# Patient Record
Sex: FEMALE | Race: BLACK OR AFRICAN AMERICAN | Employment: OTHER | URBAN - METROPOLITAN AREA
[De-identification: names, ages, dates, MRNs, and addresses within clinical notes are randomized per-mention and may not be internally consistent; named-entity substitution may affect disease eponyms.]

---

## 2019-05-08 ENCOUNTER — HOSPITAL ENCOUNTER (EMERGENCY)
Age: 44
Discharge: HOME | End: 2019-05-08
Payer: MEDICAID

## 2019-05-08 ENCOUNTER — APPOINTMENT (OUTPATIENT)
Dept: CT IMAGING | Age: 44
End: 2019-05-08
Payer: MEDICAID

## 2019-05-08 VITALS
HEART RATE: 89 BPM | DIASTOLIC BLOOD PRESSURE: 83 MMHG | TEMPERATURE: 98.7 F | WEIGHT: 293 LBS | RESPIRATION RATE: 18 BRPM | HEIGHT: 65 IN | SYSTOLIC BLOOD PRESSURE: 131 MMHG | OXYGEN SATURATION: 98 % | BODY MASS INDEX: 48.82 KG/M2

## 2019-05-08 DIAGNOSIS — G89.18 POST-OPERATIVE PAIN: Primary | ICD-10-CM

## 2019-05-08 LAB
ALBUMIN SERPL-MCNC: 4.1 G/DL (ref 3.5–5.2)
ALP BLD-CCNC: 74 U/L (ref 35–104)
ALT SERPL-CCNC: 12 U/L (ref 0–32)
ANION GAP SERPL CALCULATED.3IONS-SCNC: 14 MMOL/L (ref 7–16)
AST SERPL-CCNC: 12 U/L (ref 0–31)
BASOPHILS ABSOLUTE: 0.04 E9/L (ref 0–0.2)
BASOPHILS RELATIVE PERCENT: 0.3 % (ref 0–2)
BILIRUB SERPL-MCNC: 0.4 MG/DL (ref 0–1.2)
BILIRUBIN URINE: NEGATIVE
BLOOD, URINE: NEGATIVE
BUN BLDV-MCNC: 12 MG/DL (ref 6–20)
CALCIUM SERPL-MCNC: 9.1 MG/DL (ref 8.6–10.2)
CHLORIDE BLD-SCNC: 103 MMOL/L (ref 98–107)
CLARITY: CLEAR
CO2: 24 MMOL/L (ref 22–29)
COLOR: YELLOW
CREAT SERPL-MCNC: 0.8 MG/DL (ref 0.5–1)
EOSINOPHILS ABSOLUTE: 0.18 E9/L (ref 0.05–0.5)
EOSINOPHILS RELATIVE PERCENT: 1.3 % (ref 0–6)
GFR AFRICAN AMERICAN: >60
GFR NON-AFRICAN AMERICAN: >60 ML/MIN/1.73
GLUCOSE BLD-MCNC: 110 MG/DL (ref 74–99)
GLUCOSE URINE: NEGATIVE MG/DL
HCG, URINE, POC: NEGATIVE
HCT VFR BLD CALC: 40 % (ref 34–48)
HEMOGLOBIN: 13.1 G/DL (ref 11.5–15.5)
IMMATURE GRANULOCYTES #: 0.06 E9/L
IMMATURE GRANULOCYTES %: 0.4 % (ref 0–5)
KETONES, URINE: NEGATIVE MG/DL
LACTIC ACID: 1.6 MMOL/L (ref 0.5–2.2)
LEUKOCYTE ESTERASE, URINE: NEGATIVE
LIPASE: 27 U/L (ref 13–60)
LYMPHOCYTES ABSOLUTE: 2.85 E9/L (ref 1.5–4)
LYMPHOCYTES RELATIVE PERCENT: 21 % (ref 20–42)
Lab: NORMAL
MCH RBC QN AUTO: 30 PG (ref 26–35)
MCHC RBC AUTO-ENTMCNC: 32.8 % (ref 32–34.5)
MCV RBC AUTO: 91.5 FL (ref 80–99.9)
MONOCYTES ABSOLUTE: 0.98 E9/L (ref 0.1–0.95)
MONOCYTES RELATIVE PERCENT: 7.2 % (ref 2–12)
NEGATIVE QC PASS/FAIL: NORMAL
NEUTROPHILS ABSOLUTE: 9.43 E9/L (ref 1.8–7.3)
NEUTROPHILS RELATIVE PERCENT: 69.8 % (ref 43–80)
NITRITE, URINE: NEGATIVE
PDW BLD-RTO: 13.8 FL (ref 11.5–15)
PH UA: 6 (ref 5–9)
PLATELET # BLD: 308 E9/L (ref 130–450)
PMV BLD AUTO: 10.2 FL (ref 7–12)
POSITIVE QC PASS/FAIL: NORMAL
POTASSIUM REFLEX MAGNESIUM: 3.9 MMOL/L (ref 3.5–5)
PROTEIN UA: NEGATIVE MG/DL
RBC # BLD: 4.37 E12/L (ref 3.5–5.5)
SODIUM BLD-SCNC: 141 MMOL/L (ref 132–146)
SPECIFIC GRAVITY UA: >=1.03 (ref 1–1.03)
TOTAL PROTEIN: 7.8 G/DL (ref 6.4–8.3)
UROBILINOGEN, URINE: 1 E.U./DL
WBC # BLD: 13.5 E9/L (ref 4.5–11.5)

## 2019-05-08 PROCEDURE — 96375 TX/PRO/DX INJ NEW DRUG ADDON: CPT

## 2019-05-08 PROCEDURE — 85025 COMPLETE CBC W/AUTO DIFF WBC: CPT

## 2019-05-08 PROCEDURE — 6360000002 HC RX W HCPCS: Performed by: PHYSICIAN ASSISTANT

## 2019-05-08 PROCEDURE — 87186 SC STD MICRODIL/AGAR DIL: CPT

## 2019-05-08 PROCEDURE — 74177 CT ABD & PELVIS W/CONTRAST: CPT

## 2019-05-08 PROCEDURE — 2580000003 HC RX 258: Performed by: PHYSICIAN ASSISTANT

## 2019-05-08 PROCEDURE — 80053 COMPREHEN METABOLIC PANEL: CPT

## 2019-05-08 PROCEDURE — 83690 ASSAY OF LIPASE: CPT

## 2019-05-08 PROCEDURE — 81003 URINALYSIS AUTO W/O SCOPE: CPT

## 2019-05-08 PROCEDURE — 87088 URINE BACTERIA CULTURE: CPT

## 2019-05-08 PROCEDURE — 99284 EMERGENCY DEPT VISIT MOD MDM: CPT

## 2019-05-08 PROCEDURE — 96374 THER/PROPH/DIAG INJ IV PUSH: CPT

## 2019-05-08 PROCEDURE — 6360000004 HC RX CONTRAST MEDICATION: Performed by: RADIOLOGY

## 2019-05-08 PROCEDURE — 83605 ASSAY OF LACTIC ACID: CPT

## 2019-05-08 RX ORDER — OXYCODONE HYDROCHLORIDE AND ACETAMINOPHEN 5; 325 MG/1; MG/1
1 TABLET ORAL EVERY 6 HOURS PRN
Qty: 10 TABLET | Refills: 0 | Status: SHIPPED | OUTPATIENT
Start: 2019-05-08 | End: 2019-05-11

## 2019-05-08 RX ORDER — ONDANSETRON 2 MG/ML
4 INJECTION INTRAMUSCULAR; INTRAVENOUS ONCE
Status: COMPLETED | OUTPATIENT
Start: 2019-05-08 | End: 2019-05-08

## 2019-05-08 RX ORDER — MORPHINE SULFATE 2 MG/ML
8 INJECTION, SOLUTION INTRAMUSCULAR; INTRAVENOUS ONCE
Status: COMPLETED | OUTPATIENT
Start: 2019-05-08 | End: 2019-05-08

## 2019-05-08 RX ORDER — 0.9 % SODIUM CHLORIDE 0.9 %
1000 INTRAVENOUS SOLUTION INTRAVENOUS ONCE
Status: COMPLETED | OUTPATIENT
Start: 2019-05-08 | End: 2019-05-08

## 2019-05-08 RX ADMIN — IOPAMIDOL 110 ML: 755 INJECTION, SOLUTION INTRAVENOUS at 20:05

## 2019-05-08 RX ADMIN — SODIUM CHLORIDE 1000 ML: 9 INJECTION, SOLUTION INTRAVENOUS at 19:16

## 2019-05-08 RX ADMIN — MORPHINE SULFATE 8 MG: 2 INJECTION, SOLUTION INTRAMUSCULAR; INTRAVENOUS at 19:17

## 2019-05-08 RX ADMIN — ONDANSETRON 4 MG: 2 INJECTION INTRAMUSCULAR; INTRAVENOUS at 19:16

## 2019-05-08 ASSESSMENT — PAIN SCALES - GENERAL: PAINLEVEL_OUTOF10: 8

## 2019-05-08 ASSESSMENT — PAIN DESCRIPTION - LOCATION: LOCATION: ABDOMEN

## 2019-05-08 ASSESSMENT — PAIN DESCRIPTION - PAIN TYPE: TYPE: ACUTE PAIN;SURGICAL PAIN

## 2019-05-08 ASSESSMENT — PAIN DESCRIPTION - FREQUENCY: FREQUENCY: CONTINUOUS

## 2019-05-08 ASSESSMENT — PAIN DESCRIPTION - ORIENTATION: ORIENTATION: LOWER;LEFT

## 2019-05-08 ASSESSMENT — PAIN - FUNCTIONAL ASSESSMENT: PAIN_FUNCTIONAL_ASSESSMENT: ACTIVITIES ARE NOT PREVENTED

## 2019-05-08 ASSESSMENT — PAIN DESCRIPTION - DESCRIPTORS: DESCRIPTORS: TIGHTNESS;BURNING

## 2019-05-08 NOTE — ED PROVIDER NOTES
[facility]      Department of Emergency Medicine   ED  Provider Note  Admit Date/RoomTime: 5/8/2019  6:27 PM  ED Room: 32/32  MRN: [id number]  Chief Complaint       Abdominal Pain (Had hernia repair 2 weeks ago, still having pain, saw her surgeon 5/2 and he gave her additional pain meds but she is taking them but they are not helping)    History of Present Illness   Source of history provided by:  patient. History/Exam Limitations: none. Khushbu Negro is a 39 y.o. old female who has a past medical history of: No past medical history on file. presents to the emergency department by private vehicle, for complaints of pain since her hernia surgery burning, cramping pain in the periumbilical area and in the LUQ without radiation which began 12 day(s) prior to arrival.   There has been similar episodes in the past related to the hernia. Since onset the symptoms have been persistent. The pain is associated with nothing pertinent. The pain is aggravated by changing position and relieved by nothing, pt is taking her percocet from surgeon but they are not helping. She is using exlax for constipation. There has been NO back pain, chills, cloudy urine, dysuria or nausea or vomiting, bloody stool, or fevers. .     GYN History: regular. STD History: no history of PID, STD's. No LMP recorded. Current Pregnancy: No.   .    Gravid Status: No obstetric history on file. .  ROS   Pertinent positives and negatives are stated within HPI, all other systems reviewed and are negative. Past Surgical History:   Procedure Laterality Date    ABDOMEN SURGERY      OVARIAN CYST REMOVAL, TUBAL LIGATION, C SECTION    HYSTERECTOMY, TOTAL ABDOMINAL  5/6/2015    Dr. Pranav Amaya   Social History:  reports that she has quit smoking. She does not have any smokeless tobacco history on file. She reports that she drinks alcohol. She reports that she does not use drugs.   Family History: family history includes Asthma in her brother and sister; Cancer in her father; Heart Disease in her brother; High Blood Pressure in her brother, father, and sister. Allergies: Vicodin [hydrocodone-acetaminophen]    Physical Exam           ED Triage Vitals   BP Temp Temp Source Pulse Resp SpO2 Height Weight   05/08/19 1731 05/08/19 1731 05/08/19 1731 05/08/19 1731 05/08/19 1844 05/08/19 1731 05/08/19 1731 05/08/19 1731   (!) 159/75 98.7 °F (37.1 °C) Oral 93 20 93 % 5' 5\" (1.651 m) (!) 325 lb (147.4 kg)      Oxygen Saturation Interpretation: Normal.    · General Appearance/Constitutional:  Alert, development consistent with age. · HEENT:  NC/NT. PERRLA. Airway patent. · Neck:  Supple. No lymphadenopathy. · Respiratory:  No retractions. Lungs Clear to auscultation and breath sounds equal.  · CV:  Regular rate and rhythm. · GI:  General Appearance: scars- periumbilical.         Bowel sounds: hypoactive bowel sounds. Distension:  None. Tenderness: moderate tenderness is present umbilical and very near surrounding area, LUQ, left periumbilical area. , no rebound tenderness, no guarding, abdominal rigidity is absent. Liver: non-tender. Spleen:  non-tender. Pulsatile Mass: absent. Hernia:  incisional hernia and tender. There appears to be a hernia in the umbilicus vs, hernia repair tissue  · Back: CVA Tenderness: No.  · : (chaperone present during examination). NOt examined. · Integument:  Normal turgor. Warm, dry, without visible rash, unless noted elsewhere. · Lymphatics: No edema, cap.refill <3sec. · Neurological:  Orientation age-appropriate. Motor functions intact.     Lab / Imaging Results   (All laboratory and radiology results have been personally reviewed by myself)  Labs:  Results for orders placed or performed during the hospital encounter of 05/08/19   CBC Auto Differential   Result Value Ref Range    WBC 13.5 (H) 4.5 - 11.5 E9/L    RBC 4.37 3.50 - 5.50 E12/L Hemoglobin 13.1 11.5 - 15.5 g/dL    Hematocrit 40.0 34.0 - 48.0 %    MCV 91.5 80.0 - 99.9 fL    MCH 30.0 26.0 - 35.0 pg    MCHC 32.8 32.0 - 34.5 %    RDW 13.8 11.5 - 15.0 fL    Platelets 508 084 - 793 E9/L    MPV 10.2 7.0 - 12.0 fL    Neutrophils % 69.8 43.0 - 80.0 %    Immature Granulocytes % 0.4 0.0 - 5.0 %    Lymphocytes % 21.0 20.0 - 42.0 %    Monocytes % 7.2 2.0 - 12.0 %    Eosinophils % 1.3 0.0 - 6.0 %    Basophils % 0.3 0.0 - 2.0 %    Neutrophils # 9.43 (H) 1.80 - 7.30 E9/L    Immature Granulocytes # 0.06 E9/L    Lymphocytes # 2.85 1.50 - 4.00 E9/L    Monocytes # 0.98 (H) 0.10 - 0.95 E9/L    Eosinophils # 0.18 0.05 - 0.50 E9/L    Basophils # 0.04 0.00 - 0.20 E9/L   Comprehensive Metabolic Panel w/ Reflex to MG   Result Value Ref Range    Sodium 141 132 - 146 mmol/L    Potassium reflex Magnesium 3.9 3.5 - 5.0 mmol/L    Chloride 103 98 - 107 mmol/L    CO2 24 22 - 29 mmol/L    Anion Gap 14 7 - 16 mmol/L    Glucose 110 (H) 74 - 99 mg/dL    BUN 12 6 - 20 mg/dL    CREATININE 0.8 0.5 - 1.0 mg/dL    GFR Non-African American >60 >=60 mL/min/1.73    GFR African American >60     Calcium 9.1 8.6 - 10.2 mg/dL    Total Protein 7.8 6.4 - 8.3 g/dL    Alb 4.1 3.5 - 5.2 g/dL    Total Bilirubin 0.4 0.0 - 1.2 mg/dL    Alkaline Phosphatase 74 35 - 104 U/L    ALT 12 0 - 32 U/L    AST 12 0 - 31 U/L   Lactic Acid, Plasma   Result Value Ref Range    Lactic Acid 1.6 0.5 - 2.2 mmol/L   Lipase   Result Value Ref Range    Lipase 27 13 - 60 U/L   Urinalysis, reflex to microscopic   Result Value Ref Range    Color, UA Yellow Straw/Yellow    Clarity, UA Clear Clear    Glucose, Ur Negative Negative mg/dL    Bilirubin Urine Negative Negative    Ketones, Urine Negative Negative mg/dL    Specific Gravity, UA >=1.030 1.005 - 1.030    Blood, Urine Negative Negative    pH, UA 6.0 5.0 - 9.0    Protein, UA Negative Negative mg/dL    Urobilinogen, Urine 1.0 <2.0 E.U./dL    Nitrite, Urine Negative Negative    Leukocyte Esterase, Urine Negative Isma Solomon PA-C   DD: 5/8/19  **This report was transcribed using voice recognition software. Every effort was made to ensure accuracy; however, inadvertent computerized transcription errors may be present.   END OF ED PROVIDER NOTE       Isma Solomon PA-C  05/09/19 [id number]

## 2019-05-10 LAB
ORGANISM: ABNORMAL
URINE CULTURE, ROUTINE: ABNORMAL
URINE CULTURE, ROUTINE: ABNORMAL

## 2020-01-21 ENCOUNTER — HOSPITAL ENCOUNTER (OUTPATIENT)
Age: 45
Discharge: HOME | End: 2020-01-21
Payer: MEDICAID

## 2020-01-21 LAB
ANION GAP SERPL CALCULATED.3IONS-SCNC: 15 MMOL/L (ref 7–16)
BASOPHILS ABSOLUTE: 0.04 E9/L (ref 0–0.2)
BASOPHILS RELATIVE PERCENT: 0.3 % (ref 0–2)
BUN BLDV-MCNC: 11 MG/DL (ref 6–20)
CALCIUM SERPL-MCNC: 9.1 MG/DL (ref 8.6–10.2)
CHLORIDE BLD-SCNC: 101 MMOL/L (ref 98–107)
CHOLESTEROL, TOTAL: 184 MG/DL (ref 0–199)
CO2: 23 MMOL/L (ref 22–29)
CREAT SERPL-MCNC: 0.9 MG/DL (ref 0.5–1)
EOSINOPHILS ABSOLUTE: 0.11 E9/L (ref 0.05–0.5)
EOSINOPHILS RELATIVE PERCENT: 0.9 % (ref 0–6)
GFR AFRICAN AMERICAN: >60
GFR NON-AFRICAN AMERICAN: >60 ML/MIN/1.73
GLUCOSE BLD-MCNC: 78 MG/DL (ref 74–99)
HCT VFR BLD CALC: 42.1 % (ref 34–48)
HDLC SERPL-MCNC: 63 MG/DL
HEMOGLOBIN: 13.1 G/DL (ref 11.5–15.5)
IMMATURE GRANULOCYTES #: 0.05 E9/L
IMMATURE GRANULOCYTES %: 0.4 % (ref 0–5)
LDL CHOLESTEROL CALCULATED: 97 MG/DL (ref 0–99)
LYMPHOCYTES ABSOLUTE: 3.92 E9/L (ref 1.5–4)
LYMPHOCYTES RELATIVE PERCENT: 31.6 % (ref 20–42)
MCH RBC QN AUTO: 28.9 PG (ref 26–35)
MCHC RBC AUTO-ENTMCNC: 31.1 % (ref 32–34.5)
MCV RBC AUTO: 92.9 FL (ref 80–99.9)
MONOCYTES ABSOLUTE: 1.03 E9/L (ref 0.1–0.95)
MONOCYTES RELATIVE PERCENT: 8.3 % (ref 2–12)
NEUTROPHILS ABSOLUTE: 7.27 E9/L (ref 1.8–7.3)
NEUTROPHILS RELATIVE PERCENT: 58.5 % (ref 43–80)
PDW BLD-RTO: 14.4 FL (ref 11.5–15)
PLATELET # BLD: 285 E9/L (ref 130–450)
PMV BLD AUTO: 10.6 FL (ref 7–12)
POTASSIUM SERPL-SCNC: 4.1 MMOL/L (ref 3.5–5)
RBC # BLD: 4.53 E12/L (ref 3.5–5.5)
SODIUM BLD-SCNC: 139 MMOL/L (ref 132–146)
TRIGL SERPL-MCNC: 118 MG/DL (ref 0–149)
TSH SERPL DL<=0.05 MIU/L-ACNC: 3.18 UIU/ML (ref 0.27–4.2)
VLDLC SERPL CALC-MCNC: 24 MG/DL
WBC # BLD: 12.4 E9/L (ref 4.5–11.5)

## 2020-01-21 PROCEDURE — 80048 BASIC METABOLIC PNL TOTAL CA: CPT

## 2020-01-21 PROCEDURE — 80061 LIPID PANEL: CPT

## 2020-01-21 PROCEDURE — 36415 COLL VENOUS BLD VENIPUNCTURE: CPT

## 2020-01-21 PROCEDURE — 84443 ASSAY THYROID STIM HORMONE: CPT

## 2020-01-21 PROCEDURE — 85025 COMPLETE CBC W/AUTO DIFF WBC: CPT

## 2020-01-23 ENCOUNTER — HOSPITAL ENCOUNTER (OUTPATIENT)
Age: 45
Discharge: HOME | End: 2020-01-25

## 2020-01-23 PROCEDURE — 88305 TISSUE EXAM BY PATHOLOGIST: CPT

## 2020-01-23 PROCEDURE — 88342 IMHCHEM/IMCYTCHM 1ST ANTB: CPT

## 2021-11-02 ENCOUNTER — TELEPHONE (OUTPATIENT)
Dept: ADMINISTRATIVE | Age: 46
End: 2021-11-02

## 2021-11-17 ENCOUNTER — HOSPITAL ENCOUNTER (OUTPATIENT)
Age: 46
Discharge: HOME | End: 2021-11-19

## 2021-11-17 PROCEDURE — 88305 TISSUE EXAM BY PATHOLOGIST: CPT

## 2021-11-17 PROCEDURE — 88173 CYTOPATH EVAL FNA REPORT: CPT

## 2021-11-23 ENCOUNTER — TELEPHONE (OUTPATIENT)
Dept: ORTHOPEDIC SURGERY | Age: 46
End: 2021-11-23

## 2021-11-23 DIAGNOSIS — M25.562 ACUTE PAIN OF LEFT KNEE: Primary | ICD-10-CM

## 2021-12-01 ENCOUNTER — OFFICE VISIT (OUTPATIENT)
Dept: ORTHOPEDIC SURGERY | Age: 46
End: 2021-12-01
Payer: MEDICAID

## 2021-12-01 ENCOUNTER — HOSPITAL ENCOUNTER (OUTPATIENT)
Dept: GENERAL RADIOLOGY | Age: 46
Discharge: HOME | End: 2021-12-03
Payer: MEDICAID

## 2021-12-01 VITALS — HEIGHT: 63 IN | TEMPERATURE: 97.7 F | BODY MASS INDEX: 51.91 KG/M2 | WEIGHT: 293 LBS

## 2021-12-01 DIAGNOSIS — M25.562 ACUTE PAIN OF LEFT KNEE: ICD-10-CM

## 2021-12-01 DIAGNOSIS — M25.562 ACUTE PAIN OF LEFT KNEE: Primary | ICD-10-CM

## 2021-12-01 DIAGNOSIS — M17.12 LOCALIZED OSTEOARTHRITIS OF LEFT KNEE: ICD-10-CM

## 2021-12-01 DIAGNOSIS — E66.01 MORBID OBESITY WITH BMI OF 50.0-59.9, ADULT (HCC): Chronic | ICD-10-CM

## 2021-12-01 PROCEDURE — G8484 FLU IMMUNIZE NO ADMIN: HCPCS | Performed by: PHYSICIAN ASSISTANT

## 2021-12-01 PROCEDURE — G8417 CALC BMI ABV UP PARAM F/U: HCPCS | Performed by: PHYSICIAN ASSISTANT

## 2021-12-01 PROCEDURE — 2500000003 HC RX 250 WO HCPCS

## 2021-12-01 PROCEDURE — 4004F PT TOBACCO SCREEN RCVD TLK: CPT | Performed by: PHYSICIAN ASSISTANT

## 2021-12-01 PROCEDURE — 6360000002 HC RX W HCPCS

## 2021-12-01 PROCEDURE — 99204 OFFICE O/P NEW MOD 45 MIN: CPT | Performed by: PHYSICIAN ASSISTANT

## 2021-12-01 PROCEDURE — G8427 DOCREV CUR MEDS BY ELIG CLIN: HCPCS | Performed by: PHYSICIAN ASSISTANT

## 2021-12-01 PROCEDURE — 99202 OFFICE O/P NEW SF 15 MIN: CPT | Performed by: ORTHOPAEDIC SURGERY

## 2021-12-01 RX ORDER — LIDOCAINE HYDROCHLORIDE 10 MG/ML
3 INJECTION, SOLUTION INFILTRATION; PERINEURAL ONCE
Status: COMPLETED | OUTPATIENT
Start: 2021-12-01 | End: 2021-12-01

## 2021-12-01 RX ORDER — TRIAMCINOLONE ACETONIDE 40 MG/ML
40 INJECTION, SUSPENSION INTRA-ARTICULAR; INTRAMUSCULAR ONCE
Status: COMPLETED | OUTPATIENT
Start: 2021-12-01 | End: 2021-12-01

## 2021-12-01 RX ORDER — BUPIVACAINE HYDROCHLORIDE 2.5 MG/ML
3 INJECTION, SOLUTION EPIDURAL; INFILTRATION; INTRACAUDAL ONCE
Status: COMPLETED | OUTPATIENT
Start: 2021-12-01 | End: 2021-12-01

## 2021-12-01 RX ADMIN — LIDOCAINE HYDROCHLORIDE 3 ML: 10 INJECTION, SOLUTION INFILTRATION; PERINEURAL at 09:56

## 2021-12-01 RX ADMIN — TRIAMCINOLONE ACETONIDE 40 MG: 40 INJECTION, SUSPENSION INTRA-ARTICULAR; INTRAMUSCULAR at 09:56

## 2021-12-01 RX ADMIN — BUPIVACAINE HYDROCHLORIDE 7.5 MG: 2.5 INJECTION, SOLUTION EPIDURAL; INFILTRATION; INTRACAUDAL at 09:56

## 2021-12-01 NOTE — PROGRESS NOTES
New Patient Orthopaedic Progress Note    Sari Larson is a 51 y.o. 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 No current facility-administered medications for this visit. Allergies: Vicodin [hydrocodone-acetaminophen]  History reviewed. No pertinent past medical history. Past Surgical History:   Procedure Laterality Date    ABDOMEN SURGERY      OVARIAN CYST REMOVAL, TUBAL LIGATION, C SECTION    HYSTERECTOMY, TOTAL ABDOMINAL  5/6/2015    Dr. Lili Landis     Family History   Problem Relation Age of Onset    High Blood Pressure Father     Cancer Father     High Blood Pressure Sister     Asthma Sister     Heart Disease Brother     High Blood Pressure Brother     Asthma Brother      Social History     Tobacco Use    Smoking status: Former Smoker    Smokeless tobacco: Not on file   Substance Use Topics    Alcohol use: Yes     Comment: SOCIAL                             Chief Complaint   Patient presents with    Knee Pain     Pt expressed having constant 10/10 pain regardless of being active or not. Pt has difficutly sleeping at night due to increased pain. Pt expressed having sharp pain deep inside the Left Knee Joint. Pt expressed having crepitus within the Left Knee Joint causing increased pain. Pt expressed having a sensation of Left Knee being unstable. Pt expressed having an altered gait with ambulation. SUBJECTIVE: Sari Larson is a 40-year-old female who presents with left knee pain. She states that she injured her knee in 2016 after her knee was pinned against the wall after receiving a medial force to the left knee.   She states that her pain has been progressively worse and she feels at times like her left knee is going to buckle and give out. She does have some intermittent clicking and popping in her knee. She does use a neoprene knee sleeve for support as well as NSAIDs, Voltaren gel without much relief. She states that her left knee pain is worse with prolonged standing, walking or going up and down steps. Also states that her knee gets stiff when she is seated for too long. Sometimes she has difficulty sleeping at night due to discomfort in the knee. Also she feels that her knee grinds at times with flexion and extension. Denies numbness, tingling or paresthesias in the left leg. States that she has not had conservative measures such as therapy or injections in the knee. No prior surgeries on the left knee. Denies any other orthopedic complaints at this time. Review of Systems   Constitutional: Negative for fever, chills, diaphoresis, appetite change and fatigue. HENT: Negative for dental issues, hearing loss and tinnitus. Negative for congestion, sinus pressure, sneezing, sore throat. Negative for headache. Eyes: Negative for visual disturbance, blurred and double vision. Negative for pain, discharge, redness and itching  Respiratory: Negative for cough, shortness of breath and wheezing. Cardiovascular: Negative for chest pain, palpitations and leg swelling. No dyspnea on exertion   Gastrointestinal:   Negative for nausea, vomiting, abdominal pain, diarrhea, constipation  or black or bloody. Hematologic\Lymphatic:  negative for bleeding, petechiae,   Genitourinary: Negative for hematuria and difficulty urinating. Musculoskeletal: Negative for neck pain and stiffness. Negative for back pain, see HPI  Skin: Negative for pallor, rash and wound. Neurological: Negative for dizziness, tremors, seizures, weakness, light-headedness, no TIA or stroke symptoms. No numbness and headaches. Psychiatric/Behavioral: Negative.         OBJECTIVE: Physical Examination:   General appearance: alert, well appearing, and in no distress,  normal appearing weight. No visible signs of trauma   Mental status: alert, oriented to person, place, and time, normal mood, behavior, speech, dress, motor activity, and thought processes  Abdomen: soft, nondistended  Resp:   resp easy and unlabored, no audible wheezes note, normal symmetrical expansion of both hemithoraces  Cardiac: distal pulses palpable, skin and extremities well perfused  Neurological: alert, oriented X3, normal speech, no focal findings or movement disorder noted, motor and sensory grossly normal bilaterally, normal muscle tone, no tremors, strength 5/5, normal gait and station  HEENT: normochephalic atraumatic, external ears and eyes normal, sclera normal, neck supple, no nasal discharge. Extremities:   peripheral pulses normal, no edema, redness or tenderness in the calves   Skin: normal coloration, no rashes or open wounds, no suspicious skin lesions noted  Psych: Affect euthymic   Musculoskeletal:   Extremity:  Left Knee exam  Skin intact. No erythema/induration/fluctuence at knee. No effusion noted  Negative ballotment  Patella tracks normally  Negative patellar grind test and  Negative J sign. Mild crepitus with flexion and extension of the knee  Medial and lateral joint line pain with palpation   Stable to varus and valgus at 0 and 30 degrees of flexion. Positive McMurrays, Apleys. Negative Lachman's and posterior drawer. Active range of motion 0-115 with pain. Passive range on motion 0-120 with pain  Compartments soft and compressible throughout leg. Calf soft and nontender with palpation    Demonstrates active ankle plantar/dorsiflexion/great toe extension. Sensation intact to light touch sural/deep peroneal/superficial peroneal/saphenous/posterior tibial nerve distributions to foot/ankle. Palpable dorsalis pedis and posterior tibialis pulses.     Temp 97.7 °F (36.5 °C) (Oral) Ht 5' 3\" (1.6 m)   Wt (!) 326 lb (147.9 kg)   BMI 57.75 kg/m²      XR: 12/1/21   Left knee films taken at an outside facility demonstrate moderate overall tricompartmental osteoarthritic changes most pronounced in the medial area. No evidence of acute osseous abnormality. ASSESSMENT:   Diagnosis Orders   1. Acute pain of left knee     2. Morbid obesity with BMI of 50.0-59.9, adult ([address]     3. Localized osteoarthritis of left knee       Discussion: Had lengthy discussion with patient regarding Her diagnosis, typical prognosis, and expected outcomes. I reviewed the possible complications from the injury itself despite treatment choosen. I also discussed treatment options including nonoperative managements versus surgical management, along with risks and benefits of each. They have elected for nonoperative management at this time. PLAN:  X-rays reviewed and discussed. Procedure note:  Discussed risks and benefits of a left knee injection. She wished to proceed with the injection. Left knee was injected under sterile conditions with a combination 3 cc Marcaine, 3 cc lidocaine and 1 cc Kenalog without complications. She ambulated out of the office without difficulty. Physical and aquatic therapy at the [facility]. Referral to Dr. Surekha Youngblood to work on weight loss and healthy lifestyle. Continue neoprene left knee sleeve for comfort and support. NSAIDs as needed    Follow-up in 3 months for reevaluation. Call if any questions or concerns. Electronically signed by MAYITO Monroy on 12/1/2021 at 9:52 AM  Note: This report was completed using computerLxDATA voiced recognition software. Every effort has been made to ensure accuracy; however, inadvertent computerized transcription errors may be present.

## 2021-12-01 NOTE — PROGRESS NOTES
Fabiana Daniels is a 51 y.o. female who presents for evaluation of knees Left    Referred from Dr. John Archuleta    Symptom onset: 2016  Mechanism of Injury: Pt knee was pinned up against a wall after receiving a medial force to the Left Knee. Causing the lateral aspect of the knee to be stuck against a brick wall in 2016. Pt since then has had Left Knee pain with minor falls since 2016. Previous Treatments: rest, ice, NSAID- IBU, brace/splint which have been ineffective    Patient working in Pt is working at [employer]: left lower Full weight bearing    Assistive device No Device  Participating in therapy?  no

## 2021-12-01 NOTE — PATIENT INSTRUCTIONS
Physical and aquatic therapy at the [facility]. Referral to Dr. Zena Hendrix. Continue neoprene left knee sleeve for comfort and support. NSAIDs as needed    Follow-up in 3 months for reevaluation. Call if any questions or concerns.

## 2021-12-09 ENCOUNTER — TELEPHONE (OUTPATIENT)
Dept: BARIATRICS/WEIGHT MGMT | Age: 46
End: 2021-12-09

## 2021-12-09 NOTE — TELEPHONE ENCOUNTER
I called pt to schedule initial medical weight loss appt.  Pts phone just kept ringing and unable to lm

## 2022-01-05 ENCOUNTER — TELEPHONE (OUTPATIENT)
Dept: BARIATRICS/WEIGHT MGMT | Age: 47
End: 2022-01-05

## 2022-01-05 NOTE — TELEPHONE ENCOUNTER
I called patient for second time left message on voicemail to schedule initial medical weight loss appt.

## 2022-01-12 ENCOUNTER — HOSPITAL ENCOUNTER (OUTPATIENT)
Dept: PHYSICAL THERAPY | Age: 47
Setting detail: THERAPIES SERIES
Discharge: HOME | End: 2022-01-12
Payer: MEDICAID

## 2022-01-12 PROCEDURE — 97161 PT EVAL LOW COMPLEX 20 MIN: CPT | Performed by: PHYSICAL THERAPIST

## 2022-01-12 ASSESSMENT — PAIN SCALES - GENERAL: PAINLEVEL_OUTOF10: 7

## 2022-01-12 ASSESSMENT — PAIN DESCRIPTION - LOCATION: LOCATION: KNEE

## 2022-01-12 ASSESSMENT — PAIN DESCRIPTION - PAIN TYPE: TYPE: CHRONIC PAIN

## 2022-01-12 ASSESSMENT — PAIN DESCRIPTION - DESCRIPTORS: DESCRIPTORS: SHARP

## 2022-01-12 ASSESSMENT — PAIN DESCRIPTION - ORIENTATION: ORIENTATION: LEFT

## 2022-01-12 ASSESSMENT — PAIN DESCRIPTION - FREQUENCY: FREQUENCY: CONTINUOUS

## 2022-01-18 ENCOUNTER — HOSPITAL ENCOUNTER (OUTPATIENT)
Age: 47
Discharge: HOME | End: 2022-01-20
Payer: MEDICAID

## 2022-01-18 ENCOUNTER — NURSE ONLY (OUTPATIENT)
Dept: PRIMARY CARE CLINIC | Age: 47
End: 2022-01-18

## 2022-01-18 DIAGNOSIS — Z01.818 PREOP TESTING: ICD-10-CM

## 2022-01-18 PROCEDURE — U0003 INFECTIOUS AGENT DETECTION BY NUCLEIC ACID (DNA OR RNA); SEVERE ACUTE RESPIRATORY SYNDROME CORONAVIRUS 2 (SARS-COV-2) (CORONAVIRUS DISEASE [COVID-19]), AMPLIFIED PROBE TECHNIQUE, MAKING USE OF HIGH THROUGHPUT TECHNOLOGIES AS DESCRIBED BY CMS-2020-01-R: HCPCS

## 2022-01-18 PROCEDURE — U0005 INFEC AGEN DETEC AMPLI PROBE: HCPCS

## 2022-01-19 ENCOUNTER — PREP FOR PROCEDURE (OUTPATIENT)
Dept: SURGERY | Age: 47
End: 2022-01-19

## 2022-01-19 LAB — SARS-COV-2, PCR: NOT DETECTED

## 2022-01-19 RX ORDER — SODIUM CHLORIDE 0.9 % (FLUSH) 0.9 %
5-40 SYRINGE (ML) INJECTION EVERY 12 HOURS SCHEDULED
Status: CANCELLED | OUTPATIENT
Start: 2022-01-19

## 2022-01-19 RX ORDER — SODIUM CHLORIDE, SODIUM LACTATE, POTASSIUM CHLORIDE, CALCIUM CHLORIDE 600; 310; 30; 20 MG/100ML; MG/100ML; MG/100ML; MG/100ML
INJECTION, SOLUTION INTRAVENOUS CONTINUOUS
Status: CANCELLED | OUTPATIENT
Start: 2022-01-19

## 2022-01-19 RX ORDER — SODIUM CHLORIDE 0.9 % (FLUSH) 0.9 %
5-40 SYRINGE (ML) INJECTION PRN
Status: CANCELLED | OUTPATIENT
Start: 2022-01-19

## 2022-01-19 NOTE — PROGRESS NOTES
[place] PRE-ADMISSION TESTING INSTRUCTIONS    The Preadmission Testing patient is instructed accordingly using the following criteria (check applicable):    ARRIVAL INSTRUCTIONS:  [x] Parking the day of Surgery is located in the Main Entrance lot. Upon entering the door, make an immediate right to the surgery reception desk    [x] Bring photo ID and insurance card    [] Bring in a copy of Living will or Durable Power of  papers. [x] Please be sure to arrange for responsible adult to provide transportation to and from the hospital    [x] Please arrange for responsible adult to be with you for the 24 hour period post procedure due to having anesthesia      GENERAL INSTRUCTIONS:    [x] Nothing by mouth after midnight, including gum, candy, mints or water    [x] You may brush your teeth, but do not swallow any water    [] Take medications as instructed with 1-2 oz of water    [x] Stop herbal supplements and vitamins 5 days prior to procedure    [x] Follow preop dosing of blood thinners per physician instructions    [] Take 1/2 dose of evening insulin, but no insulin after midnight    [] No oral diabetic medications after midnight    [] If diabetic and have low blood sugar or feel symptomatic, take 1-2oz apple juice only    [] Bring inhalers day of surgery    [] Bring C-PAP/ Bi-Pap day of surgery    [] Bring urine specimen day of surgery    [x] Shower or bath with soap, lather and rinse well, AM of Surgery, no lotion, powders or creams to surgical site    [] Follow bowel prep as instructed per surgeon    [x] No tobacco products within 24 hours of surgery     [x] No alcohol or illegal drug use within 24 hours of surgery.     [x] Jewelry, body piercing's, eyeglasses, contact lenses and dentures are not permitted into surgery (bring cases)      [x] Please do not wear any nail polish, make up or hair products on the day of surgery    [x] You can expect a call the business day prior to procedure to notify you if your arrival time changes    [x] If you receive a survey after surgery we would greatly appreciate your comments    [] Parent/guardian of a minor must accompany their child and remain on the premises  the entire time they are under our care     [] Pediatric patients may bring favorite toy, blanket or comfort item with them    [] A caregiver or family member must remain with the patient during their stay if they are mentally handicapped, have dementia, disoriented or unable to use a call light or would be a safety concern if left unattended    [x] Please notify surgeon if you develop any illness between now and time of surgery (cold, cough, sore throat, fever, nausea, vomiting) or any signs of infections  including skin, wounds, and dental.    [x]  The Outpatient Pharmacy is available to fill your prescription here on your day of surgery, ask your preop nurse for details    [x] Other instructions: Wear comfortable clothing    EDUCATIONAL MATERIALS PROVIDED:    [] PAT Preoperative Education Packet/Booklet     [] Medication List    [] Transfusion bracelet applied with instructions    [] Shower with soap, lather and rinse well, and use CHG wipes provided the evening before surgery as instructed    [] Incentive spirometer with instructions          Have you been tested for COVID  Yes    (1-18-22 negative)       Have you been told you were positive for COVID No  Have you had any known exposure to someone that is positive for COVID No  Do you have a cough                   No              Do you have shortness of breath No                 Do you have a sore throat            No                Are you having chills                    No                Are you having muscle aches. No                    Please come to the hospital wearing a mask and have your significant other wear a mask as well.   Both of you should check your temperature before leaving to come here,  if it is 100 or higher please call [phone] for instruction.

## 2022-01-21 ENCOUNTER — HOSPITAL ENCOUNTER (OUTPATIENT)
Age: 47
Setting detail: OBSERVATION
Discharge: HOME | End: 2022-01-21
Attending: SURGERY | Admitting: SURGERY
Payer: MEDICAID

## 2022-01-21 ENCOUNTER — ANESTHESIA EVENT (OUTPATIENT)
Dept: OPERATING ROOM | Age: 47
End: 2022-01-21
Payer: MEDICAID

## 2022-01-21 ENCOUNTER — ANESTHESIA (OUTPATIENT)
Dept: OPERATING ROOM | Age: 47
End: 2022-01-21
Payer: MEDICAID

## 2022-01-21 VITALS
OXYGEN SATURATION: 96 % | RESPIRATION RATE: 16 BRPM | HEART RATE: 85 BPM | TEMPERATURE: 98.4 F | DIASTOLIC BLOOD PRESSURE: 82 MMHG | HEIGHT: 64 IN | BODY MASS INDEX: 50.02 KG/M2 | SYSTOLIC BLOOD PRESSURE: 161 MMHG | WEIGHT: 293 LBS

## 2022-01-21 VITALS — DIASTOLIC BLOOD PRESSURE: 89 MMHG | OXYGEN SATURATION: 95 % | SYSTOLIC BLOOD PRESSURE: 170 MMHG | TEMPERATURE: 62.2 F

## 2022-01-21 DIAGNOSIS — E89.0 S/P PARTIAL THYROIDECTOMY: ICD-10-CM

## 2022-01-21 DIAGNOSIS — Z01.818 PREOP TESTING: Primary | ICD-10-CM

## 2022-01-21 PROBLEM — D44.0 NEOPLASM OF UNCERTAIN BEHAVIOR OF THYROID GLAND: Status: ACTIVE | Noted: 2022-01-21

## 2022-01-21 PROCEDURE — 2720000010 HC SURG SUPPLY STERILE: Performed by: SURGERY

## 2022-01-21 PROCEDURE — 3700000001 HC ADD 15 MINUTES (ANESTHESIA): Performed by: SURGERY

## 2022-01-21 PROCEDURE — 2709999900 HC NON-CHARGEABLE SUPPLY: Performed by: SURGERY

## 2022-01-21 PROCEDURE — 6360000002 HC RX W HCPCS: Performed by: ANESTHESIOLOGIST ASSISTANT

## 2022-01-21 PROCEDURE — 7100000010 HC PHASE II RECOVERY - FIRST 15 MIN: Performed by: SURGERY

## 2022-01-21 PROCEDURE — 7100000011 HC PHASE II RECOVERY - ADDTL 15 MIN: Performed by: SURGERY

## 2022-01-21 PROCEDURE — 3700000000 HC ANESTHESIA ATTENDED CARE: Performed by: SURGERY

## 2022-01-21 PROCEDURE — 6360000002 HC RX W HCPCS: Performed by: SURGERY

## 2022-01-21 PROCEDURE — 3600000013 HC SURGERY LEVEL 3 ADDTL 15MIN: Performed by: SURGERY

## 2022-01-21 PROCEDURE — 3600000003 HC SURGERY LEVEL 3 BASE: Performed by: SURGERY

## 2022-01-21 PROCEDURE — 6370000000 HC RX 637 (ALT 250 FOR IP): Performed by: ANESTHESIOLOGY

## 2022-01-21 PROCEDURE — 6360000002 HC RX W HCPCS: Performed by: ANESTHESIOLOGY

## 2022-01-21 PROCEDURE — 2500000003 HC RX 250 WO HCPCS: Performed by: ANESTHESIOLOGIST ASSISTANT

## 2022-01-21 PROCEDURE — 2580000003 HC RX 258: Performed by: SURGERY

## 2022-01-21 PROCEDURE — 7100000000 HC PACU RECOVERY - FIRST 15 MIN: Performed by: SURGERY

## 2022-01-21 PROCEDURE — 7100000001 HC PACU RECOVERY - ADDTL 15 MIN: Performed by: SURGERY

## 2022-01-21 PROCEDURE — 88307 TISSUE EXAM BY PATHOLOGIST: CPT

## 2022-01-21 RX ORDER — FENTANYL CITRATE 50 UG/ML
25 INJECTION, SOLUTION INTRAMUSCULAR; INTRAVENOUS EVERY 5 MIN PRN
Status: DISCONTINUED | OUTPATIENT
Start: 2022-01-21 | End: 2022-01-21 | Stop reason: HOSPADM

## 2022-01-21 RX ORDER — DEXAMETHASONE SODIUM PHOSPHATE 4 MG/ML
INJECTION, SOLUTION INTRA-ARTICULAR; INTRALESIONAL; INTRAMUSCULAR; INTRAVENOUS; SOFT TISSUE PRN
Status: DISCONTINUED | OUTPATIENT
Start: 2022-01-21 | End: 2022-01-21 | Stop reason: SDUPTHER

## 2022-01-21 RX ORDER — SUCCINYLCHOLINE/SOD CL,ISO/PF 200MG/10ML
SYRINGE (ML) INTRAVENOUS PRN
Status: DISCONTINUED | OUTPATIENT
Start: 2022-01-21 | End: 2022-01-21 | Stop reason: SDUPTHER

## 2022-01-21 RX ORDER — SODIUM CHLORIDE, SODIUM LACTATE, POTASSIUM CHLORIDE, CALCIUM CHLORIDE 600; 310; 30; 20 MG/100ML; MG/100ML; MG/100ML; MG/100ML
INJECTION, SOLUTION INTRAVENOUS CONTINUOUS
Status: DISCONTINUED | OUTPATIENT
Start: 2022-01-21 | End: 2022-01-21 | Stop reason: HOSPADM

## 2022-01-21 RX ORDER — PROPOFOL 10 MG/ML
INJECTION, EMULSION INTRAVENOUS PRN
Status: DISCONTINUED | OUTPATIENT
Start: 2022-01-21 | End: 2022-01-21 | Stop reason: SDUPTHER

## 2022-01-21 RX ORDER — FENTANYL CITRATE 50 UG/ML
50 INJECTION, SOLUTION INTRAMUSCULAR; INTRAVENOUS EVERY 5 MIN PRN
Status: DISCONTINUED | OUTPATIENT
Start: 2022-01-21 | End: 2022-01-21 | Stop reason: HOSPADM

## 2022-01-21 RX ORDER — SODIUM CHLORIDE 0.9 % (FLUSH) 0.9 %
5-40 SYRINGE (ML) INJECTION PRN
Status: DISCONTINUED | OUTPATIENT
Start: 2022-01-21 | End: 2022-01-21 | Stop reason: HOSPADM

## 2022-01-21 RX ORDER — OXYCODONE HYDROCHLORIDE AND ACETAMINOPHEN 5; 325 MG/1; MG/1
1 TABLET ORAL EVERY 6 HOURS PRN
Qty: 28 TABLET | Refills: 0 | Status: SHIPPED | OUTPATIENT
Start: 2022-01-21 | End: 2022-01-28

## 2022-01-21 RX ORDER — ONDANSETRON 2 MG/ML
INJECTION INTRAMUSCULAR; INTRAVENOUS PRN
Status: DISCONTINUED | OUTPATIENT
Start: 2022-01-21 | End: 2022-01-21 | Stop reason: SDUPTHER

## 2022-01-21 RX ORDER — OXYCODONE HYDROCHLORIDE AND ACETAMINOPHEN 5; 325 MG/1; MG/1
1 TABLET ORAL
Status: COMPLETED | OUTPATIENT
Start: 2022-01-21 | End: 2022-01-21

## 2022-01-21 RX ORDER — FENTANYL CITRATE 50 UG/ML
INJECTION, SOLUTION INTRAMUSCULAR; INTRAVENOUS PRN
Status: DISCONTINUED | OUTPATIENT
Start: 2022-01-21 | End: 2022-01-21 | Stop reason: SDUPTHER

## 2022-01-21 RX ORDER — MIDAZOLAM HYDROCHLORIDE 1 MG/ML
INJECTION INTRAMUSCULAR; INTRAVENOUS PRN
Status: DISCONTINUED | OUTPATIENT
Start: 2022-01-21 | End: 2022-01-21 | Stop reason: SDUPTHER

## 2022-01-21 RX ORDER — PROMETHAZINE HYDROCHLORIDE 25 MG/ML
6.25 INJECTION, SOLUTION INTRAMUSCULAR; INTRAVENOUS
Status: DISCONTINUED | OUTPATIENT
Start: 2022-01-21 | End: 2022-01-21 | Stop reason: HOSPADM

## 2022-01-21 RX ORDER — LIDOCAINE HYDROCHLORIDE 20 MG/ML
INJECTION, SOLUTION EPIDURAL; INFILTRATION; INTRACAUDAL; PERINEURAL PRN
Status: DISCONTINUED | OUTPATIENT
Start: 2022-01-21 | End: 2022-01-21 | Stop reason: SDUPTHER

## 2022-01-21 RX ORDER — ROCURONIUM BROMIDE 10 MG/ML
INJECTION, SOLUTION INTRAVENOUS PRN
Status: DISCONTINUED | OUTPATIENT
Start: 2022-01-21 | End: 2022-01-21 | Stop reason: SDUPTHER

## 2022-01-21 RX ORDER — SODIUM CHLORIDE 0.9 % (FLUSH) 0.9 %
5-40 SYRINGE (ML) INJECTION EVERY 12 HOURS SCHEDULED
Status: DISCONTINUED | OUTPATIENT
Start: 2022-01-21 | End: 2022-01-21 | Stop reason: HOSPADM

## 2022-01-21 RX ADMIN — PROPOFOL 180 MG: 10 INJECTION, EMULSION INTRAVENOUS at 10:19

## 2022-01-21 RX ADMIN — Medication 140 MG: at 10:19

## 2022-01-21 RX ADMIN — FENTANYL CITRATE 50 MCG: 0.05 INJECTION, SOLUTION INTRAMUSCULAR; INTRAVENOUS at 13:14

## 2022-01-21 RX ADMIN — PROPOFOL 150 MG: 10 INJECTION, EMULSION INTRAVENOUS at 10:25

## 2022-01-21 RX ADMIN — PHENYLEPHRINE HYDROCHLORIDE 200 MCG: 10 INJECTION INTRAVENOUS at 11:04

## 2022-01-21 RX ADMIN — SODIUM CHLORIDE, POTASSIUM CHLORIDE, SODIUM LACTATE AND CALCIUM CHLORIDE: 600; 310; 30; 20 INJECTION, SOLUTION INTRAVENOUS at 10:11

## 2022-01-21 RX ADMIN — FENTANYL CITRATE 50 MCG: 50 INJECTION, SOLUTION INTRAMUSCULAR; INTRAVENOUS at 10:33

## 2022-01-21 RX ADMIN — FENTANYL CITRATE 50 MCG: 50 INJECTION, SOLUTION INTRAMUSCULAR; INTRAVENOUS at 10:49

## 2022-01-21 RX ADMIN — DEXAMETHASONE SODIUM PHOSPHATE 8 MG: 4 INJECTION INTRA-ARTICULAR; INTRALESIONAL; INTRAMUSCULAR; INTRAVENOUS; SOFT TISSUE at 10:29

## 2022-01-21 RX ADMIN — LIDOCAINE HYDROCHLORIDE 100 MG: 20 INJECTION, SOLUTION EPIDURAL; INFILTRATION; INTRACAUDAL; PERINEURAL at 10:19

## 2022-01-21 RX ADMIN — PROPOFOL 120 MCG/KG/MIN: 10 INJECTION, EMULSION INTRAVENOUS at 10:46

## 2022-01-21 RX ADMIN — PHENYLEPHRINE HYDROCHLORIDE 200 MCG: 10 INJECTION INTRAVENOUS at 11:01

## 2022-01-21 RX ADMIN — FENTANYL CITRATE 100 MCG: 50 INJECTION, SOLUTION INTRAMUSCULAR; INTRAVENOUS at 10:26

## 2022-01-21 RX ADMIN — SODIUM CHLORIDE, POTASSIUM CHLORIDE, SODIUM LACTATE AND CALCIUM CHLORIDE: 600; 310; 30; 20 INJECTION, SOLUTION INTRAVENOUS at 11:25

## 2022-01-21 RX ADMIN — ROCURONIUM BROMIDE 5 MG: 50 INJECTION, SOLUTION INTRAVENOUS at 10:19

## 2022-01-21 RX ADMIN — OXYCODONE HYDROCHLORIDE AND ACETAMINOPHEN 1 TABLET: 5; 325 TABLET ORAL at 14:46

## 2022-01-21 RX ADMIN — FENTANYL CITRATE 50 MCG: 0.05 INJECTION, SOLUTION INTRAMUSCULAR; INTRAVENOUS at 12:11

## 2022-01-21 RX ADMIN — ONDANSETRON 4 MG: 2 INJECTION INTRAMUSCULAR; INTRAVENOUS at 11:20

## 2022-01-21 RX ADMIN — CEFAZOLIN 3000 MG: 10 INJECTION, POWDER, FOR SOLUTION INTRAVENOUS at 10:27

## 2022-01-21 RX ADMIN — MIDAZOLAM 2 MG: 1 INJECTION INTRAMUSCULAR; INTRAVENOUS at 10:11

## 2022-01-21 RX ADMIN — FENTANYL CITRATE 100 MCG: 50 INJECTION, SOLUTION INTRAMUSCULAR; INTRAVENOUS at 10:11

## 2022-01-21 ASSESSMENT — PULMONARY FUNCTION TESTS
PIF_VALUE: 24
PIF_VALUE: 32
PIF_VALUE: 32
PIF_VALUE: 31
PIF_VALUE: 34
PIF_VALUE: 0
PIF_VALUE: 28
PIF_VALUE: 33
PIF_VALUE: 30
PIF_VALUE: 30
PIF_VALUE: 32
PIF_VALUE: 33
PIF_VALUE: 32
PIF_VALUE: 23
PIF_VALUE: 27
PIF_VALUE: 1
PIF_VALUE: 32
PIF_VALUE: 26
PIF_VALUE: 23
PIF_VALUE: 33
PIF_VALUE: 32
PIF_VALUE: 28
PIF_VALUE: 32
PIF_VALUE: 31
PIF_VALUE: 28
PIF_VALUE: 32
PIF_VALUE: 24
PIF_VALUE: 23
PIF_VALUE: 31
PIF_VALUE: 28
PIF_VALUE: 31
PIF_VALUE: 30
PIF_VALUE: 31
PIF_VALUE: 37
PIF_VALUE: 10
PIF_VALUE: 28
PIF_VALUE: 23
PIF_VALUE: 32
PIF_VALUE: 28
PIF_VALUE: 32
PIF_VALUE: 27
PIF_VALUE: 28
PIF_VALUE: 28
PIF_VALUE: 3
PIF_VALUE: 30
PIF_VALUE: 24
PIF_VALUE: 32
PIF_VALUE: 24
PIF_VALUE: 32
PIF_VALUE: 24
PIF_VALUE: 31
PIF_VALUE: 24
PIF_VALUE: 29
PIF_VALUE: 24
PIF_VALUE: 30
PIF_VALUE: 28
PIF_VALUE: 31
PIF_VALUE: 1
PIF_VALUE: 23
PIF_VALUE: 31
PIF_VALUE: 3
PIF_VALUE: 27
PIF_VALUE: 33
PIF_VALUE: 1
PIF_VALUE: 1
PIF_VALUE: 7
PIF_VALUE: 30
PIF_VALUE: 0
PIF_VALUE: 28
PIF_VALUE: 25
PIF_VALUE: 28
PIF_VALUE: 33
PIF_VALUE: 1
PIF_VALUE: 31
PIF_VALUE: 26
PIF_VALUE: 35
PIF_VALUE: 28
PIF_VALUE: 0
PIF_VALUE: 28
PIF_VALUE: 1
PIF_VALUE: 31
PIF_VALUE: 32
PIF_VALUE: 23
PIF_VALUE: 1
PIF_VALUE: 32
PIF_VALUE: 0
PIF_VALUE: 24
PIF_VALUE: 1
PIF_VALUE: 10
PIF_VALUE: 31
PIF_VALUE: 29
PIF_VALUE: 11
PIF_VALUE: 24
PIF_VALUE: 3
PIF_VALUE: 28
PIF_VALUE: 30
PIF_VALUE: 27
PIF_VALUE: 28
PIF_VALUE: 30
PIF_VALUE: 24
PIF_VALUE: 29

## 2022-01-21 ASSESSMENT — PAIN DESCRIPTION - DESCRIPTORS
DESCRIPTORS: ACHING;DISCOMFORT
DESCRIPTORS: ACHING;DISCOMFORT

## 2022-01-21 ASSESSMENT — PAIN DESCRIPTION - FREQUENCY: FREQUENCY: CONTINUOUS

## 2022-01-21 ASSESSMENT — PAIN SCALES - GENERAL
PAINLEVEL_OUTOF10: 7
PAINLEVEL_OUTOF10: 0
PAINLEVEL_OUTOF10: 8
PAINLEVEL_OUTOF10: 0
PAINLEVEL_OUTOF10: 3
PAINLEVEL_OUTOF10: 7

## 2022-01-21 ASSESSMENT — PAIN DESCRIPTION - LOCATION
LOCATION: THROAT
LOCATION: THROAT

## 2022-01-21 ASSESSMENT — PAIN DESCRIPTION - ONSET: ONSET: GRADUAL

## 2022-01-21 ASSESSMENT — PAIN DESCRIPTION - PAIN TYPE
TYPE: SURGICAL PAIN
TYPE: SURGICAL PAIN

## 2022-01-21 ASSESSMENT — PAIN - FUNCTIONAL ASSESSMENT: PAIN_FUNCTIONAL_ASSESSMENT: 0-10

## 2022-01-21 ASSESSMENT — PAIN DESCRIPTION - PROGRESSION: CLINICAL_PROGRESSION: GRADUALLY WORSENING

## 2022-01-21 NOTE — ANESTHESIA POSTPROCEDURE EVALUATION
Department of Anesthesiology  Postprocedure Note    Patient: Sol Cloud  MRN: [id number]  YOB: 1975  Date of evaluation: 1/21/2022  Time:  5:58 PM     Procedure Summary     Date: 01/21/22 [place] [facility] [place] [address] / [address] [place] [address]    Anesthesia Start: 1010 Anesthesia Stop: 2737    Procedure: RIGHT THYROID LOBECTOMY (N/A Neck) Diagnosis: (RIGHT THYROID NODULE)    Surgeons: Marlon Doran MD Responsible Provider: Maribeth Esparza MD    Anesthesia Type: general ASA Status: 3          Anesthesia Type: general    Castillo Phase I: Catsillo Score: 8    Castillo Phase II: Castillo Score: 10    Last vitals: Reviewed and per EMR flowsheets.        Anesthesia Post Evaluation    Patient location during evaluation: PACU  Patient participation: complete - patient participated  Level of consciousness: awake and alert  Pain score: 3  Airway patency: patent  Nausea & Vomiting: no vomiting and no nausea  Complications: no  Cardiovascular status: hemodynamically stable  Respiratory status: spontaneous ventilation  Hydration status: stable

## 2022-01-21 NOTE — H&P
COMPREHENSIVE SURGICAL [facility]  Name: Catie Arevalo               : 1975 Sex: F  Age: 51 yrs  Acct#:  [id number]             Patient was referred by Kristal Logan. Patient's primary care provider is Kristal Logan. CC:  Results review     HPI: Status post ultrasound-guided biopsy of right thyroid mass. Biopsy was negative for malignant cells. Discussed indications for surgery. Discussed possibility of sampling error. 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 Discussed need for overnight observation to monitor for bleeding. Discussed use of the nerve integrity monitor and risk of voice changes                                  Time spent reviewing records, discussing findings, answering questions, reviewing laboratory studies, radiologic exams, and other diagnostics, and reviewing the diagnostic and therapeutic plan: 20 minutes     Voice recognition software was used in the preparation of this document. Despite all efforts to prevent them, transcription errors may have occurred.   Seen by:

## 2022-01-21 NOTE — ANESTHESIA PRE PROCEDURE
Department of Anesthesiology  Preprocedure Note       Name:  Mary Altamirano   Age:  51 y.o.  :  1975                                          MRN:  [id number]         Date:  2022      Surgeon: Pardeep Tucker):  Rocco Aguila MD    Procedure: Procedure(s):  RIGHT THYROID LOBECTOMY   ++NEEDS NERVE INTEGRITY MONITOR++    Medications prior to admission:   Prior to Admission medications    Not on File       Current medications:    Current Facility-Administered Medications   Medication Dose Route Frequency Provider Last Rate Last Admin    ceFAZolin (ANCEF) 3,000 mg in dextrose 5 % 100 mL IVPB  3,000 mg IntraVENous On Call to [address] [place] [address], MD        lactated ringers infusion   IntraVENous Continuous Rocco Aguila MD        sodium chloride flush 0.9 % injection 5-40 mL  5-40 mL IntraVENous 2 times per day Rocco Aguila MD        sodium chloride flush 0.9 % injection 5-40 mL  5-40 mL IntraVENous PRN Rocco Aguila MD           Allergies:     Allergies   Allergen Reactions    Vicodin [Hydrocodone-Acetaminophen] Hives       Problem List:    Patient Active Problem List   Diagnosis Code    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 Sharla Rosario       Social History:    Social History     Tobacco Use    Smoking status: Former Smoker    Smokeless tobacco: Never Used   Substance Use Topics    Alcohol use: Not Currently                                Counseling given: Not Answered      Vital Signs (Current):   Vitals:    22 1511 22 0833   BP:  132/78 Pulse:  85   Resp:  20   Temp:  97.1 °F (36.2 °C)   TempSrc:  Temporal   SpO2:  100%   Weight: (!) 315 lb (142.9 kg) (!) 315 lb (142.9 kg)   Height: 5' 4\" (1.626 m) 5' 4\" (1.626 m)                                              BP Readings from Last 3 Encounters:   01/21/22 132/78   05/08/19 131/83   04/29/15 121/59       NPO Status: Time of last liquid consumption: 2330                        Time of last solid consumption: 2130                        Date of last liquid consumption: 01/20/22                        Date of last solid food consumption: 01/20/22    BMI:   Wt Readings from Last 3 Encounters:   01/21/22 (!) 315 lb (142.9 kg)   12/01/21 (!) 326 lb (147.9 kg)   05/08/19 (!) 325 lb (147.4 kg)     Body mass index is 54.07 kg/m². CBC:   Lab Results   Component Value Date    WBC 12.4 01/21/2020    RBC 4.53 01/21/2020    HGB 13.1 01/21/2020    HCT 42.1 01/21/2020    MCV 92.9 01/21/2020    RDW 14.4 01/21/2020     01/21/2020       CMP:   Lab Results   Component Value Date     01/21/2020    K 4.1 01/21/2020    K 3.9 05/08/2019     01/21/2020    CO2 23 01/21/2020    BUN 11 01/21/2020    CREATININE 0.9 01/21/2020    GFRAA >60 01/21/2020    LABGLOM >60 01/21/2020    GLUCOSE 78 01/21/2020    PROT 7.8 05/08/2019    CALCIUM 9.1 01/21/2020    BILITOT 0.4 05/08/2019    ALKPHOS 74 05/08/2019    AST 12 05/08/2019    ALT 12 05/08/2019       POC Tests: No results for input(s): POCGLU, POCNA, POCK, POCCL, POCBUN, POCHEMO, POCHCT in the last 72 hours.     Coags: No results found for: PROTIME, INR, APTT    HCG (If Applicable):   Lab Results   Component Value Date    PREGTESTUR negative 05/06/2015        ABGs: No results found for: PHART, PO2ART, CCH7IKW, VUP7DHU, BEART, Z8XJUJPH     Type & Screen (If Applicable):  No results found for: LABABO, LABRH    Drug/Infectious Status (If Applicable):  No results found for: HIV, HEPCAB    COVID-19 Screening (If Applicable):   Lab Results   Component Value Date COVID19 Not Detected 01/18/2022           Anesthesia Evaluation  Patient summary reviewed and Nursing notes reviewed no history of anesthetic complications:   Airway: Mallampati: III  TM distance: >3 FB   Neck ROM: full  Mouth opening: > = 3 FB Dental: normal exam         Pulmonary:Negative Pulmonary ROS and normal exam                               Cardiovascular:  Exercise tolerance: good (>4 METS),                     Neuro/Psych:   Negative Neuro/Psych ROS              GI/Hepatic/Renal:   (+) morbid obesity          Endo/Other:    (+) malignancy/cancer. Abdominal:             Vascular: negative vascular ROS. Other Findings:             Anesthesia Plan      general     ASA 3       Induction: intravenous. MIPS: Postoperative opioids intended and Prophylactic antiemetics administered. Anesthetic plan and risks discussed with patient.       Plan discussed with CRNA and surgical team.                  Jason Che MD   1/21/2022

## 2022-01-21 NOTE — OP NOTE
Operative Note      Patient: Zoran Andrew  YOB: 1975  MRN: [id number]    Date of Procedure: 1/21/2022    Pre-Op Diagnosis: RIGHT THYROID NODULE    Post-Op Diagnosis: Same       Procedure(s):  RIGHT THYROID LOBECTOMY    Surgeon: Megan De Oliveira MD    Assistant: Hernando Dougherty MD    Anesthesia: General    Estimated Blood Loss (mL): less than 50     Complications: None    Specimens:   ID Type Source Tests Collected by Time Destination   A : Right thyroid lobe-long stitch lateral; short stitch superior Tissue Tissue SURGICAL PATHOLOGY Megan De Oliveira MD 1/21/2022 1118        Implants:  * No implants in log *      Drains: * No LDAs found *    Findings: See below    Detailed Description of Procedure:   Patient was taken to the operating room and placed on the operating table in a supine position. General anesthesia was administered. Both arms were tucked, and a shoulder roll was placed. The nerve integrity monitor electrodes were fixed to the skin. The neck was prepped and draped usual sterile fashion. Landmarks were identified. An incision was marked with a marking pen approximately 6 cm curvilinear 2 fingerbreadths above the sternal notch. An incision was made with a 15 blade. Soft tissue were divided with cautery. Platysma was divided with cautery. A subplatysmal flap was raised superiorly with cautery up to the thyroid cartilage. The subplatysmal flap was also developed inferiorly with cautery. Retractors were placed. The strap muscles were identified. The midline was identified and divided with cautery. The thyroid gland was identified. Blunt dissection on the surface of the gland was performed laterally towards the right. A peanut was used to further this dissection. The superior lobe was bluntly freed up and the pedicle was identified. It was divided with the vessel sealer. Further dissection along the posterior aspect of the gland was performed with the peanut.   The nerve was seen and preserved, confirmed with the integrity monitor. Middle thyroid vein was divided with the vessel sealer. Inferior pole attachments were also divided with the vessel sealer. While paying attention to the nerve, the posterior attachments to the trachea were divided with the vessel sealer. The ligamentous attachments of the trachea were further divided towards the left side. We then divided the isthmus with the vessel sealer. The right thyroid lobe was delivered as a specimen and labeled with sutures. Hemostasis was obtained with cautery. The strap muscles were closed with a running 2-0 Vicryl suture. Interrupted 3-0 Vicryl sutures were placed in the platysma. A running 4-0 subcuticular Vicryl suture was placed in the skin. Steri-Strips were applied. The patient tolerated procedure well and went to recovery.     Electronically signed by Megan De Oliveira MD on 1/21/2022 at 2:24 PM

## 2022-01-26 ENCOUNTER — OFFICE VISIT (OUTPATIENT)
Dept: BARIATRICS/WEIGHT MGMT | Age: 47
End: 2022-01-26
Payer: MEDICAID

## 2022-01-26 VITALS
WEIGHT: 293 LBS | BODY MASS INDEX: 47.09 KG/M2 | TEMPERATURE: 97.1 F | HEIGHT: 66 IN | HEART RATE: 78 BPM | SYSTOLIC BLOOD PRESSURE: 134 MMHG | DIASTOLIC BLOOD PRESSURE: 75 MMHG

## 2022-01-26 DIAGNOSIS — G89.29 CHRONIC PAIN OF LEFT KNEE: Primary | ICD-10-CM

## 2022-01-26 DIAGNOSIS — M25.562 CHRONIC PAIN OF LEFT KNEE: Primary | ICD-10-CM

## 2022-01-26 DIAGNOSIS — E66.01 CLASS 3 SEVERE OBESITY DUE TO EXCESS CALORIES WITHOUT SERIOUS COMORBIDITY WITH BODY MASS INDEX (BMI) OF 50.0 TO 59.9 IN ADULT (HCC): ICD-10-CM

## 2022-01-26 PROCEDURE — 99205 OFFICE O/P NEW HI 60 MIN: CPT | Performed by: INTERNAL MEDICINE

## 2022-01-26 PROCEDURE — 99202 OFFICE O/P NEW SF 15 MIN: CPT

## 2022-01-26 NOTE — PATIENT INSTRUCTIONS
Rules:  · Count every calorie every day  · Limit sweets to one day per month  · Limit chips/crackers/pretzels/nuts/popcorn to 100 anupama/day  · Eliminate all sugar sweetened beverages (including fruit juice)  · Limit restaurants (including fast food and food from a convenience store) to one time every two weeks while in town    Requirements:  · Make sure protein intake is at least 60 grams per day (do not count protein every day; instead spot check your intake every 2-3 weeks and make sure what you think you are getting is close to accurate; consider using a protein shake if needed; these are in the pharmacy section of the stores, not the grocery section; Premier, Pure Protein and Fairlife are relatively inexpensive and taste good to most patients; other options are Nectar, Boost Max, Ensure Max, BeneProtein and GNC lean (which is lactose-free); Nectar fruit, Premier Protein Clear, IsoPure Protein Drink, and Protein 2 O are water-based options; Quest (or Cosco, which is cheaper and is ordered on [employer]) and the vWise protein bars can also be used, but have less protein in them )  (Disclaimer: Dietary supplements rarely have their listed ingredients and the amount of each verified by a third party other. Sometimes they give verification for their claims to be GMO and gluten free and to be organic. However, even such verifications as these may still be untrustworthy.)    · Make sure that fiber intake is at least 22 grams per day. Do this by either eating 12 tablespoons of the original, plain Fiber One cereal every day or 4 tablespoons of wheat dextrin powder (Benefiber or a generic brand) every day. Work up to this amount slowly by starting with only one-eighth to one-fourth of the target amount and then adding another one-eighth to one-fourth every one or two weeks until reaching the target.     · Take one multivitamin every day    Targets:  · Limit calorie intake to 1400 calories/day  · Walk 30 minutes daily or equivalent  · Avoid eating 2 hours within bedtime. Tips:  · Do not eat outside of the dining room or the kitchen  · Do not eat while watching TV, videos, working on the computer or using a smart phone  · Do not eat food out of a multi-serving bag or container. · Establish 6 hours of food-free \"time-out\" periods (times you don't eat) each day. No period can be less than 1 hour long. The periods need to be the same every day for days that are the same (for example, workdays would have one set of food free periods and weekends would have another set of days). These six hours are in addition to the two hours before bedtime and the time spent sleeping. Return to see me in 4 weeks.

## 2022-01-26 NOTE — PROGRESS NOTES
Medication Sig Start Date End Date Taking? Authorizing Provider   oxyCODONE-acetaminophen (PERCOCET) 5-325 MG per tablet Take 1 tablet by mouth every 6 hours as needed for Pain for up to 7 days. Intended supply: 5 days. Take lowest dose possible to manage pain 1/21/22 1/28/22  Liudmila Harvey,      Allergies: Allergies   Allergen Reactions    Vicodin [Hydrocodone-Acetaminophen] Hives       Family history: DM: no, Heart disease: sister and brother. 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 ROS -  Card - no CP, MEANS on climbing a flight of stairs. No cane or walker. GI - no N/V/D/C    PE -  Gen : /75 (Site: Left Upper Arm, Position: Sitting, Cuff Size: Thigh)   Pulse 78   Temp 97.1 °F (36.2 °C) (Temporal)   Ht 5' 5.5\" (1.664 m)   Wt (!) 318 lb 12.8 oz (144.6 kg)   LMP 04/06/2015   BMI 52.24 kg/m²    WN, WD, NAD  Neck: Surgical wound of left thyroid lobectomy appears clean, has tape and some edema, was not palpated. Lung: Nml resp effort, CTA B/L  Heart:  RRR w/o MGR, no pitting edema noted  Psych: Normal mood   Full affect  Neuro: Moves all ext well  ______________________  Available lab results in 'results' section was reviewed, TSH, lipid panel, bmp looks wnl, but is from 1/2020. Recent labwork result requested. HISTORY & ASSESSMENT/PLAN -     Problem 1  - Left knee pain   HPI   - Ongoing, currently better after thyroid surgery (she is on Percocet which may be helping), does bother some with ambulation, though she is able to walk, and does not use cane or walker  Assessment  - currently fairly controlled   Plan   - Currently on percocet, had intra-articular injection that helped for about a month, can take tylenol or ibuprofen OTC if needed when percocet is finished.  Weight reduction per plan below    Problem 2  - Obesity   HPI   - See above Background for description    Weight  Date    318.75  1/26/2022  DEN = 2214 Tam/d = 61182 Tam/wk  Wt effect of HR foods = fast foods, chips, sweets = 250 Tam/d = 1750 Tam/wk = 26 lb/year. Assessment  - Uncontrolled  Plan   - Talked about various options. Opted for calorie count and low calorie diet. Does not want appetite suppressant currently, recommended food log if able. Will review in 4 weeks if appetite suppressant needed, and will also see if pt would like bariatric surgery evaluation. Diet plan as follows:  Patient Instructions   Rules:  · Count every calorie every day  · Limit sweets to one day per month  · Limit chips/crackers/pretzels/nuts/popcorn to 100 tam/day  · Eliminate all sugar sweetened beverages (including fruit juice)  · Limit restaurants (including fast food and food from a convenience store) to one time every two weeks while in town    Requirements:  · Make sure protein intake is at least 60 grams per day (do not count protein every day; instead spot check your intake every 2-3 weeks and make sure what you think you are getting is close to accurate; consider using a protein shake if needed; these are in the pharmacy section of the stores, not the grocery section; Premier, Pure Protein and Fairlife are relatively inexpensive and taste good to most patients; other options are Nectar, Boost Max, Ensure Max, BeneProtein and GNC lean (which is lactose-free); Nectar fruit, Premier Protein Clear, IsoPure Protein Drink, and Protein 2 O are water-based options; Quest (or Cosco, which is cheaper and is ordered on [address]) and the Oh Yeah 1 protein bars can also be used, but have less protein in them )  (Disclaimer: Dietary supplements rarely have their listed ingredients and the amount of each verified by a third party other. Sometimes they give verification for their claims to be GMO and gluten free and to be organic. However, even such verifications as these may still be untrustworthy.)    · Make sure that fiber intake is at least 22 grams per day.  Do this by either eating 12 tablespoons of the original, plain Fiber One cereal every day or 4 tablespoons of wheat dextrin powder (Benefiber or a generic brand) every day. Work up to this amount slowly by starting with only one-eighth to one-fourth of the target amount and then adding another one-eighth to one-fourth every one or two weeks until reaching the target. · Take one multivitamin every day    Targets:  · Limit calorie intake to 1400 calories/day  · Walk 30 minutes daily or equivalent  · Avoid eating 2 hours within bedtime. Tips:  · Do not eat outside of the dining room or the kitchen  · Do not eat while watching TV, videos, working on the computer or using a smart phone  · Do not eat food out of a multi-serving bag or container. · Establish 6 hours of food-free \"time-out\" periods (times you don't eat) each day. No period can be less than 1 hour long. The periods need to be the same every day for days that are the same (for example, workdays would have one set of food free periods and weekends would have another set of days). These six hours are in addition to the two hours before bedtime and the time spent sleeping. Return to see me in 4 weeks. Problem 3  - Recent thyroid surgery (right thyroid lobectomy)   HPI   - was done for thyroid nodule on 1/21/2022, pain is controlled, no issues  Assessment  - controlled  Plan   - No h/o hypo or hyperthyroidism, pt unsure of TFT, had bloodwork recently, will try to get the results. Other management by Dr. Emi Pak and Dr. Lory Concepcion. Total time spent on encounter: 62 min. Azalia Dominique MD  Internal Medicine/Obesity Medicine  1/26/2022.

## 2022-02-07 ENCOUNTER — HOSPITAL ENCOUNTER (OUTPATIENT)
Dept: PHYSICAL THERAPY | Age: 47
Setting detail: THERAPIES SERIES
Discharge: HOME | End: 2022-02-07
Payer: MEDICAID

## 2022-02-07 PROCEDURE — 97113 AQUATIC THERAPY/EXERCISES: CPT

## 2022-02-07 NOTE — PROGRESS NOTES
[facility]  Phone: [phone] Fax: [phone]        Physical Therapy Aquatic Flow Sheet   Date:  2022    Patient Name:  Kolby Paul    :  1975  Restrictions/Precautions:    Diagnosis:  M17.12 (ICD-10-CM) - Localized osteoarthritis of left knee  Treatment Diagnosis:  M17.12 (ICD-10-CM) - Localized osteoarthritis of left knee  Insurance/Certification information:  [facility]  Referring Physician:  Mat Diego  Plan of care signed (Y/N):    Visit# / total visits:    Pain level: 8/10   Electronically signed by:  Mike Nath, LILLIAN  Time [id number]:[id number]  Time AMARJIT:1991    Subjective:Patient presents to PT with c/o L knee pain. Patient reports symptoms have been present for many years. States knee pain remains constant. Occasional \"popping\" occurs with movement of the knee. She recently had injection to knee with no significant relief reported. Patient ambulates without AD. No recent falls reported. She flexes/extends knee throughout the day to maintain movement however no formal exercise. Patient takes ibuprofen for pain relief.       Key  B= Belt DB= Dumbells T= Theratube   H= Hydrotone N= Noodles W= Weights   P= Paddles S= Speedo equipment K= Kickboard     Exercises/Activities   Warm-up/Amb  Minutes  Exercises  Minutes    Slow forward  5  HR/TR  5    Slow sideways  5  Marches  5    Slow backwards  5  Mini-squats  5    Medium forward    4-way SLR  5    Medium sideways    Hip circles/fig 8  5    Small shuffle    Hamstring curls  5    Jog    Knee extension  5    Braiding  5  Pelvic tilts  5    Bicycling    Scap squeezes          Shoulder flex/ext      Functional    Shoulder abd/add      Step    Shoulder H. abd/add      Lifting    Shoulder IR/ER      Hand to opp knee    Rowing      Push down squat    Bilateral pull down      UE PNF    Push/pull      LE PNF    Push downs      Wall push ups    Arm circles      SLS    Elbow flex/ext          Chin tuck      Stretching UT shrugs/rolls      Gastroc/Soleus    Rocking horse      Hamstring          SKTC    Other      Piriformis          Hip flexor          Ladder pull          Pec stretch          Post deltoid           Timed Code Treatment Minutes:  60    Total Treatment Minutes:  60    Treatment/Activity Tolerance:  [x] Patient tolerated treatment well [] Patient limited by fatique  [] Patient limited by pain [] Patient limited by other medical complications  [] Other:     Prognosis: [] Good [x] Fair  [] Poor    Patient Requires Follow-up: [x] Yes  [] No    Plan:   [x] Continue per plan of care [] Alter current plan (see comments)  [] Plan of care initiated [] Hold pending MD visit [] Discharge    Treatment Charges: Mins Units   Initial Evaluation     Re-Evaluation     Ther Exercise         TE     Aquatic Treatment 60 4   Ther Activities        TA     Gait Training          GT     Neuro Re-education NR     Modalities     Non-Billable Service Time     Other     Total Time/Units 60 4           Electronically signed by:  Erasmo Oppenheim PTA [id number]

## 2022-02-10 ENCOUNTER — HOSPITAL ENCOUNTER (OUTPATIENT)
Dept: PHYSICAL THERAPY | Age: 47
Setting detail: THERAPIES SERIES
Discharge: HOME | End: 2022-02-10
Payer: MEDICAID

## 2022-02-10 PROCEDURE — 97113 AQUATIC THERAPY/EXERCISES: CPT

## 2022-02-13 NOTE — PROGRESS NOTES
[facility]  Phone: [phone] Fax: [phone]        Physical Therapy Aquatic Flow Sheet   Date:  02/10/2022  Patient Name:  Sarita Perez    :  1975  Restrictions/Precautions:    Diagnosis:  M17.12 (ICD-10-CM) - Localized osteoarthritis of left knee  Treatment Diagnosis:  M17.12 (ICD-10-CM) - Localized osteoarthritis of left knee  Insurance/Certification information:  [facility]  Referring Physician:  Ronal Mcclain  Plan of care signed (Y/N):    Visit# / total visits:  3/12  Pain level: 8/10   Electronically signed by:  Mick Donaldson PTA  Time PW:4036  Time FHM:8435    Subjective:Patient presents to PT with c/o L knee pain. Patient reports symptoms have been present for many years. States knee pain remains constant. Occasional \"popping\" occurs with movement of the knee. She recently had injection to knee with no significant relief reported. Patient ambulates without AD. No recent falls reported. She flexes/extends knee throughout the day to maintain movement however no formal exercise. Patient takes ibuprofen for pain relief.       Key  B= Belt DB= Dumbells T= Theratube   H= Hydrotone N= Noodles W= Weights   P= Paddles S= Speedo equipment K= Kickboard     Exercises/Activities   Warm-up/Amb  Minutes  Exercises  Minutes    Slow forward  5  HR/TR  5    Slow sideways  5  Marches  5    Slow backwards  5  Mini-squats  5    Medium forward    4-way SLR  5    Medium sideways    Hip circles/fig 8  5    Small shuffle    Hamstring curls  5    Jog    Knee extension  5    Braiding  5  Pelvic tilts  5    Bicycling    Scap squeezes          Shoulder flex/ext      Functional    Shoulder abd/add      Step    Shoulder H. abd/add      Lifting    Shoulder IR/ER      Hand to opp knee    Rowing      Push down squat    Bilateral pull down      UE PNF    Push/pull      LE PNF    Push downs      Wall push ups    Arm circles      SLS    Elbow flex/ext          Chin tuck      Stretching UT shrugs/rolls      Gastroc/Soleus    Rocking horse      Hamstring          SKTC    Other      Piriformis          Hip flexor          Ladder pull          Pec stretch          Post deltoid         Time Frame for Short term goals: 3 weeks  Short term goal 1: increase ambulation with pt demonstrating heel-toe progression with minimal trunk deviation  Short term goal 2: decrease pain to < 5/10 across L knee with activity  Long term goals  Time Frame for Long term goals : 6 weeks  Long term goal 1: increase ambulation with pt demonstrating proper heel-toe gait with little to no deviation  Long term goal 2: decrease pain to < 3/10 across L knee with activity  Long term goal 3: pt demonstrates independence with HEP  Patient Goals   Patient goals : to reduce knee pain  Timed Code Treatment Minutes:  60    Total Treatment Minutes:  60    Treatment/Activity Tolerance:  [x] Patient tolerated treatment well [] Patient limited by fatique  [] Patient limited by pain [] Patient limited by other medical complications  [] Other:     Prognosis: [] Good [x] Fair  [] Poor    Patient Requires Follow-up: [x] Yes  [] No    Plan:   [x] Continue per plan of care [] Alter current plan (see comments)  [] Plan of care initiated [] Hold pending MD visit [] Discharge    Treatment Charges: Mins Units   Initial Evaluation     Re-Evaluation     Ther Exercise         TE     Aquatic Treatment 60 4   Ther Activities        TA     Gait Training          GT     Neuro Re-education NR     Modalities     Non-Billable Service Time     Other     Total Time/Units 60 4           Electronically signed by:  Landen Lee PTA [id number]

## 2022-02-14 ENCOUNTER — HOSPITAL ENCOUNTER (OUTPATIENT)
Dept: PHYSICAL THERAPY | Age: 47
Setting detail: THERAPIES SERIES
Discharge: HOME | End: 2022-02-14
Payer: MEDICAID

## 2022-02-14 PROCEDURE — 97113 AQUATIC THERAPY/EXERCISES: CPT

## 2022-02-14 NOTE — PROGRESS NOTES
[facility]  Phone: [phone] Fax: [phone]        Physical Therapy Aquatic Flow Sheet   Date:  02/10/2022  Patient Name:  Mj Davis    :  1975  Restrictions/Precautions:    Diagnosis:  M17.12 (ICD-10-CM) - Localized osteoarthritis of left knee  Treatment Diagnosis:  M17.12 (ICD-10-CM) - Localized osteoarthritis of left knee  Insurance/Certification information:  [facility]  Referring Physician:  Disha Jewell  Plan of care signed (Y/N):    Visit# / total visits:    Pain level: 8/10   Electronically signed by:  Rashi Vaca PTA  Time EN:6782  Time HCM:8540    Subjective:Patient presents to PT with c/o L knee pain. Patient reports symptoms have been present for many years. States knee pain remains constant. Occasional \"popping\" occurs with movement of the knee. She recently had injection to knee with no significant relief reported. Patient ambulates without AD. No recent falls reported. She flexes/extends knee throughout the day to maintain movement however no formal exercise. Patient takes ibuprofen for pain relief.       Key  B= Belt DB= Dumbells T= Theratube   H= Hydrotone N= Noodles W= Weights   P= Paddles S= Speedo equipment K= Kickboard     Exercises/Activities   Warm-up/Amb  Minutes  Exercises  Minutes    Slow forward  5  HR/TR  5    Slow sideways  5  Marches  5    Slow backwards  5  Mini-squats  5    Medium forward    4-way SLR  5    Medium sideways    Hip circles/fig 8  5    Small shuffle    Hamstring curls  5    Jog    Knee extension  5    Braiding  5  Pelvic tilts  5    Bicycling    Scap squeezes          Shoulder flex/ext      Functional    Shoulder abd/add      Step    Shoulder H. abd/add      Lifting    Shoulder IR/ER      Hand to opp knee    Rowing      Push down squat    Bilateral pull down      UE PNF    Push/pull      LE PNF    Push downs      Wall push ups    Arm circles      SLS    Elbow flex/ext          Chin tuck      Stretching

## 2022-02-17 ENCOUNTER — HOSPITAL ENCOUNTER (OUTPATIENT)
Dept: PHYSICAL THERAPY | Age: 47
Setting detail: THERAPIES SERIES
Discharge: HOME | End: 2022-02-17
Payer: MEDICAID

## 2022-02-17 PROCEDURE — 97113 AQUATIC THERAPY/EXERCISES: CPT

## 2022-02-17 NOTE — PROGRESS NOTES
[facility]  Phone: [phone] Fax: [phone]        Physical Therapy Aquatic Flow Sheet   Date:  2022  Patient Name:  Corinne Broosk    :  1975  Restrictions/Precautions:    Diagnosis:  M17.12 (ICD-10-CM) - Localized osteoarthritis of left knee  Treatment Diagnosis:  M17.12 (ICD-10-CM) - Localized osteoarthritis of left knee  Insurance/Certification information:  [facility]  Referring Physician:  Esperanza Kurtz  Plan of care signed (Y/N):    Visit# / total visits:    Pain level: 8/10     Electronically signed by:  Moi Anderson PTA  Time TX:0453  Time HRX:7764    Key  B= Belt DB= Dumbells T= Theratube   H= Hydrotone N= Noodles W= Weights   P= Paddles S= Speedo equipment K= Kickboard     Exercises/Activities   Warm-up/Amb  Minutes  Exercises  Minutes    Slow forward  5  HR/TR  5    Slow sideways  5  Marches  5    Slow backwards  5  Mini-squats  5    Medium forward    4-way SLR  5    Medium sideways    Hip circles/fig 8  5    Small shuffle    Hamstring curls  5    Jog    Knee extension  5    Braiding  5  Pelvic tilts  5    Bicycling    Scap squeezes          Shoulder flex/ext      Functional    Shoulder abd/add      Step    Shoulder H. abd/add      Lifting    Shoulder IR/ER      Hand to opp knee    Rowing      Push down squat    Bilateral pull down      UE PNF    Push/pull      LE PNF    Push downs      Wall push ups    Arm circles      SLS    Elbow flex/ext          Chin tuck      Stretching    UT shrugs/rolls      Gastroc/Soleus    Rocking horse      Hamstring          SKTC    Other      Piriformis          Hip flexor          Ladder pull          Pec stretch          Post deltoid         Time Frame for Short term goals: 3 weeks  Short term goal 1: increase ambulation with pt demonstrating heel-toe progression with minimal trunk deviation  Short term goal 2: decrease pain to < 5/10 across L knee with activity  Long term goals  Time Frame for Long term goals : 6 weeks  Long term goal 1: increase ambulation with pt demonstrating proper heel-toe gait with little to no deviation  Long term goal 2: decrease pain to < 3/10 across L knee with activity  Long term goal 3: pt demonstrates independence with HEP  Patient Goals   Patient goals : to reduce knee pain  Timed Code Treatment Minutes:  60    Total Treatment Minutes:  60    Treatment/Activity Tolerance:  [x] Patient tolerated treatment well [] Patient limited by fatique  [] Patient limited by pain [] Patient limited by other medical complications  [] Other:     Prognosis: [] Good [x] Fair  [] Poor    Patient Requires Follow-up: [x] Yes  [] No    Plan:   [x] Continue per plan of care [] Alter current plan (see comments)  [] Plan of care initiated [] Hold pending MD visit [] Discharge    Treatment Charges: Mins Units   Initial Evaluation     Re-Evaluation     Ther Exercise         TE     Aquatic Treatment 60 4   Ther Activities        TA     Gait Training          GT     Neuro Re-education NR     Modalities     Non-Billable Service Time     Other     Total Time/Units 60 4           Electronically signed by:  Rebel Nam PTA [id number]

## 2022-02-21 ENCOUNTER — HOSPITAL ENCOUNTER (OUTPATIENT)
Dept: PHYSICAL THERAPY | Age: 47
Setting detail: THERAPIES SERIES
Discharge: HOME | End: 2022-02-21
Payer: MEDICAID

## 2022-02-21 PROCEDURE — 97113 AQUATIC THERAPY/EXERCISES: CPT

## 2022-02-21 NOTE — PROGRESS NOTES
[facility]  Phone: [phone] Fax: [phone]        Physical Therapy Aquatic Flow Sheet   Date:  2022  Patient Name:  Zoran Andrew    :  1975  Restrictions/Precautions:    Diagnosis:  M17.12 (ICD-10-CM) - Localized osteoarthritis of left knee  Treatment Diagnosis:  M17.12 (ICD-10-CM) - Localized osteoarthritis of left knee  Insurance/Certification information:  [facility]  Referring Physician:  Antonette Martin  Plan of care signed (Y/N):    Visit# / total visits:    Pain level:      Electronically signed by:  Xuan Wilson PTA  Time TY:1432  Time FOH:5367    Key  B= Belt DB= Dumbells T= Theratube   H= Hydrotone N= Noodles W= Weights   P= Paddles S= Speedo equipment K= Kickboard     Exercises/Activities   Warm-up/Amb  Minutes  Exercises  Minutes    Slow forward  5  HR/TR  5    Slow sideways  5  Marches  5    Slow backwards  5  Mini-squats  5    Medium forward    4-way SLR  5    Medium sideways    Hip circles/fig 8  5    Small shuffle    Hamstring curls  5    Jog    Knee extension  5    Braiding  5  Pelvic tilts  5    Bicycling    Scap squeezes          Shoulder flex/ext      Functional    Shoulder abd/add      Step    Shoulder H. abd/add      Lifting    Shoulder IR/ER      Hand to opp knee    Rowing      Push down squat    Bilateral pull down      UE PNF    Push/pull      LE PNF    Push downs      Wall push ups    Arm circles      SLS    Elbow flex/ext          Chin tuck      Stretching    UT shrugs/rolls      Gastroc/Soleus    Rocking horse      Hamstring          SKTC    Other      Piriformis          Hip flexor          Ladder pull          Pec stretch          Post deltoid           Long term goals  Time Frame for Long term goals : 6 weeks  Long term goal 1: increase ambulation with pt demonstrating proper heel-toe gait with little to no deviation  Long term goal 2: decrease pain to < 3/10 across L knee with activity  Long term goal 3: pt demonstrates independence with HEP  Patient Goals   Patient goals : to reduce knee pain  Timed Code Treatment Minutes:  60    Total Treatment Minutes:  60    Treatment/Activity Tolerance:  [x] Patient tolerated treatment well [] Patient limited by fatique  [] Patient limited by pain [] Patient limited by other medical complications  [x] Other: Pt at 6/12 visits-Achieves both STGs  Short term goal 1: increase ambulation with pt demonstrating heel-toe progression with minimal trunk deviation-ACHIEVED  Short term goal 2: decrease pain to < 5/10 across L knee with activity-ACHIEVED  Prognosis: [] Good [x] Fair  [] Poor    Patient Requires Follow-up: [x] Yes  [] No    Plan:   [x] Continue per plan of care [] Alter current plan (see comments)  [] Plan of care initiated [] Hold pending MD visit [] Discharge    Treatment Charges: Mins Units   Initial Evaluation     Re-Evaluation     Ther Exercise         TE     Aquatic Treatment 60 4   Ther Activities        TA     Gait Training          GT     Neuro Re-education NR     Modalities     Non-Billable Service Time     Other     Total Time/Units 60 4           Electronically signed by:  Mike Nath PTA [id number]

## 2022-02-23 ENCOUNTER — OFFICE VISIT (OUTPATIENT)
Dept: BARIATRICS/WEIGHT MGMT | Age: 47
End: 2022-02-23
Payer: MEDICAID

## 2022-02-23 VITALS
HEIGHT: 66 IN | SYSTOLIC BLOOD PRESSURE: 129 MMHG | WEIGHT: 293 LBS | HEART RATE: 96 BPM | BODY MASS INDEX: 47.09 KG/M2 | DIASTOLIC BLOOD PRESSURE: 75 MMHG | TEMPERATURE: 96.8 F

## 2022-02-23 DIAGNOSIS — G89.29 CHRONIC PAIN OF LEFT KNEE: Primary | ICD-10-CM

## 2022-02-23 DIAGNOSIS — M25.562 CHRONIC PAIN OF LEFT KNEE: Primary | ICD-10-CM

## 2022-02-23 DIAGNOSIS — E66.01 CLASS 3 SEVERE OBESITY DUE TO EXCESS CALORIES WITHOUT SERIOUS COMORBIDITY WITH BODY MASS INDEX (BMI) OF 50.0 TO 59.9 IN ADULT (HCC): ICD-10-CM

## 2022-02-23 PROCEDURE — 99211 OFF/OP EST MAY X REQ PHY/QHP: CPT

## 2022-02-23 PROCEDURE — 99214 OFFICE O/P EST MOD 30 MIN: CPT | Performed by: INTERNAL MEDICINE

## 2022-02-23 NOTE — PATIENT INSTRUCTIONS
Protein: >60 g/day. Fiber: >25 g/day. · Make sure protein intake is at least 60 grams per day (do not count protein every day; instead spot check your intake every 2-3 weeks and make sure what you think you are getting is close to accurate; consider using a protein shake if needed; these are in the pharmacy section of the stores, not the grocery section; Premier, Pure Protein and Fairlife are relatively inexpensive and taste good to most patients; other options are Nectar, Boost Max, Ensure Max, BeneProtein and GNC lean (which is lactose-free); Nectar fruit, Premier Protein Clear, IsoPure Protein Drink, and Protein 2 O are water-based options; Quest (or Cosco, which is cheaper and is ordered on [address]) and the [facility] 1 protein bars can also be used, but have less protein in them )  (Disclaimer: Dietary supplements rarely have their listed ingredients and the amount of each verified by a third party other. Sometimes they give verification for their claims to be GMO and gluten free and to be organic. However, even such verifications as these may still be untrustworthy.) (<200 Tam, >25 g protein)     · Make sure that fiber intake is at least 22 grams per day. Do this by either eating 12 tablespoons of the original, plain Fiber One cereal every day or 4 tablespoons of wheat dextrin powder (Benefiber or a generic brand) every day. Work up to this amount slowly by starting with only one-eighth to one-fourth of the target amount and then adding another one-eighth to one-fourth every one or two weeks until reaching the target.     · Take one multivitamin every day      Low calorie non-starchy vegetables:  Food (per 100 g) Calories Fibers T.  Carbohydrates Protein Fat Sodium (mg) Potassium (mg)   Green Bell Peppers 10 1.7 4.6 0.9 0.2 3 175   Cucumbers 15 0.5 3.6 0.7 0.1 2 147   Celery 16 1.6 3 0.7 0.2 80 260   Tomatoes 18 1.2 3.9 0.9 0.2 5 237   Carrots 41 2.8 10 0.9 0.2 69 320   Cabbage 25 2.5 6 1.3 0.1 18 170 Broccoli 35 3.3 7.2 2.4 0.4 41 293   Cauliflower 23 2.3 4.1 1.8 0.5 15 142   Iceberg Lettuce 14 1.2 3 0.9 0.1 10 141   Kale 28 2 5.6 1.9 0.4 23 228   Brussel Sprouts 43 3.8 9 3.4 0.3 25 389   Spinach 23 2.4 3.8 3 0.3 70 466   Zucchini 15 1 2.7 1.1 0.4 3 264   Mushroom 28 2.2 5.3 2.2 0.5 2 356   Asparagus 22 2 4.1 2.4 0.2 14 224   Green Beans 35 3.2 7.9 1.9 0.3 1 146   Eggplant 35 2.5 8.7 0.8 0.2 1 123     Follow up in about a month.

## 2022-02-23 NOTE — PROGRESS NOTES
CC -   Follow up of: Left knee pain, weight gain    BACKGROUND -   Last visit: 1/26/2022  First visit: 1/26/2022    Follow up(2/23/22) Workout: walking, upper body (30 min) and water therapy 1 hr. Counting calories using smartphone tiffanie.  Obesity (all weight in lbs)  Began after having children (1995)  Initial BMI 52.24, Wt 318.75  HS Grad wt 160   Lowest   wt 160   Highest  wt 318.75  Pattern of wt gain: gradual with some fluctuation  Wt change past yr: similar to now  Most wt lost: none  Other diets attempted: not tried    Desire to lose weight: 10/10  Problem posed by appetite: 1-2/10    Initial Diet:    Number of meals per day - 1    Number of snacks per day - 2-3    Meal volume - 12\" plate, no seconds    Fast food/convenience store - 2x/week (eg sandwich or 2 slices of pizza)    Restaurants (not fast food) - 0x/week   Sweets - 3d/week (ice cream)   Chips - 7d/week (~160 Tam)   Crackers/pretzels - 0d/week   Nuts - 0d/week   Peanut Butter - 0-1d/week   Popcorn - 0d/week   Dried fruit - 0d/week   Whole fruit - 7d/week (watermelon, cantaloupes, apples, berries, grapes)   Breakfast cereal - 0d/week   Granola/Protein/Energy bar - 0d/week   Sugar sweetened beverages - occasional orange juice, 2-3 glasses of gingerale everyday (8-12 oz in total of gingerale + ice).    Protein - No supplements   Fiber - No supplements    Snacks: cheese+crackers, and noodle etc     Initial exercise:    Gym membership - no    Walking - no    Running - no    Resistance - no    Aerobic class - no        Initial sleep: Bedtime: 3-4am, wake up time: 9am - wakes up rested, daytime naps: no daytime naps    Weight scale at home: yes, takes weight: 1x/month  Food scale: no  ______________________    [facility] -  Past Medical History:   Diagnosis Date    Chronic pain of left knee     Class 3 severe obesity due to excess calories without serious comorbidity with body mass index (BMI) of 50.0 to 59.9 in adult [facility])     Enlarged thyroid      Past Surgical History:   Procedure Laterality Date    ABDOMEN SURGERY      OVARIAN CYST REMOVAL, TUBAL LIGATION, C SECTION    HERNIA REPAIR      x 2    HYSTERECTOMY, TOTAL ABDOMINAL  5/6/2015    Dr. Jules Dailey N/A 1/21/2022    RIGHT THYROID LOBECTOMY performed by Lisa Inman MD at [address]     Prior to Admission medications    Not on File       Allergies: Allergies   Allergen Reactions    Vicodin [Hydrocodone-Acetaminophen] Hives       Family history: DM: no, Heart disease: sister and brother. 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 ROS -  Card - no CP  GI - no N/V/D/C    PE -  Gen : /75 (Site: Left Upper Arm, Position: Sitting, Cuff Size: Thigh)   Pulse 96   Temp 96.8 °F (36 °C) (Temporal)   Ht 5' 5.5\" (1.664 m)   Wt (!) 312 lb 3.2 oz (141.6 kg)   LMP 04/06/2015   BMI 51.16 kg/m²    WN, WD, NAD  Lung: Nml resp effort, CTA B/L  Heart:  RRR w/o MGR, no pitting edema noted  Psych: Normal mood   Full affect  Neuro: Moves all ext well  ______________________  Available lab results in 'results' section was reviewed, TSH, lipid panel, bmp looks wnl, but is from 1/2020. Recent labwork result requested. HISTORY & ASSESSMENT/PLAN -     Problem 1  - Left knee pain   HPI   - Ongoing but tolerable, no acute issues  Assessment  - fairly controlled   Plan   - Can take tylenol or ibuprofen prn (with food), side effects discussed. Weight reduction can help, planned as noted below. Problem 2  - Obesity   HPI   - See above Background for description    Weight  Date    318.8  1/26/2022    213.2   2/23/22  Total weight change to date: -6.6 lbs  Average daily energy variance:   1/26/22 - 2/23/22: -6.6 lbs (16211 Tam)/28 days = -825 Tam/day deficit. DEN = 2214 Tam/d  Assessment  - Uncontrolled  Plan   - She is doing well with her diet plan, using [employer] tiffanie to count calories and exercise. Would like to continue.  Does not want appetite suppressant at this time. Recommended to take protein >=60g/day, fiber >25g/day, and a multivitamin daily - script sent to pharmacy. Planned as follows:  Patient Instructions     Protein: >60 g/day. Fiber: >25 g/day. · Make sure protein intake is at least 60 grams per day (do not count protein every day; instead spot check your intake every 2-3 weeks and make sure what you think you are getting is close to accurate; consider using a protein shake if needed; these are in the pharmacy section of the stores, not the grocery section; Premier, Pure Protein and Fairlife are relatively inexpensive and taste good to most patients; other options are Nectar, Boost Max, Ensure Max, BeneProtein and GNC lean (which is lactose-free); Nectar fruit, Premier Protein Clear, IsoPure Protein Drink, and Protein 2 O are water-based options; Quest (or Cosco, which is cheaper and is ordered on [address]) and the [employer] 1 protein bars can also be used, but have less protein in them )  (Disclaimer: Dietary supplements rarely have their listed ingredients and the amount of each verified by a third party other. Sometimes they give verification for their claims to be GMO and gluten free and to be organic. However, even such verifications as these may still be untrustworthy.) (<200 Tam, >25 g protein)     · Make sure that fiber intake is at least 22 grams per day. Do this by either eating 12 tablespoons of the original, plain Fiber One cereal every day or 4 tablespoons of wheat dextrin powder (Benefiber or a generic brand) every day. Work up to this amount slowly by starting with only one-eighth to one-fourth of the target amount and then adding another one-eighth to one-fourth every one or two weeks until reaching the target.     · Take one multivitamin every day      List of low calorie non-starchy vegetables provided. Follow up in about a month.       Problem 3  - Recent thyroid surgery (right thyroid lobectomy)   HPI   - was done for thyroid nodule on 1/21/2022, wound is healing well, no issues  Assessment  - controlled  Plan   - No h/o hypo or hyperthyroidism, last TSH was 3.18. Other management by Dr. Sharla Carrillo and Dr. Melendrez. Total time spent on encounter:  31 min. Barb Dean MD  Internal Medicine/Obesity Medicine  2/23/2022.

## 2022-02-24 ENCOUNTER — HOSPITAL ENCOUNTER (OUTPATIENT)
Dept: PHYSICAL THERAPY | Age: 47
Setting detail: THERAPIES SERIES
Discharge: HOME | End: 2022-02-24
Payer: MEDICAID

## 2022-02-24 ENCOUNTER — TELEPHONE (OUTPATIENT)
Dept: ORTHOPEDIC SURGERY | Age: 47
End: 2022-02-24

## 2022-02-24 DIAGNOSIS — M25.562 ACUTE PAIN OF LEFT KNEE: Primary | ICD-10-CM

## 2022-02-24 PROCEDURE — 97113 AQUATIC THERAPY/EXERCISES: CPT

## 2022-02-27 NOTE — PROGRESS NOTES
[facility]  Phone: [phone] Fax: [phone]        Physical Therapy Aquatic Flow Sheet   Date:  2022  Patient Name:  Marck Negrete    :  1975  Restrictions/Precautions:    Diagnosis:  M17.12 (ICD-10-CM) - Localized osteoarthritis of left knee  Treatment Diagnosis:  M17.12 (ICD-10-CM) - Localized osteoarthritis of left knee  Insurance/Certification information:  [facility]  Referring Physician:  Evens Palafox  Plan of care signed (Y/N):    Visit# / total visits:    Pain level:      Electronically signed by:  Tracie Randle PTA  Time IL:7826  Time TZY:0954  Subjective : Pt here at  visits. She states \"I've lost 7lbs on my diet; knee feeling better. \"  Key  B= Belt DB= Dumbells T= Theratube   H= Hydrotone N= Noodles W= Weights   P= Paddles S= Speedo equipment K= Kickboard     Exercises/Activities   Warm-up/Amb  Minutes  Exercises  Minutes    Slow forward  5  HR/TR  5    Slow sideways  5  Marches  5    Slow backwards  5  Mini-squats  5    Medium forward    4-way SLR  5    Medium sideways    Hip circles/fig 8  5    Small shuffle    Hamstring curls  5    Jog    Knee extension  5    Braiding  5  Pelvic tilts  5    Bicycling    Scap squeezes          Shoulder flex/ext      Functional    Shoulder abd/add      Step    Shoulder H. abd/add      Lifting    Shoulder IR/ER      Hand to opp knee    Rowing      Push down squat    Bilateral pull down      UE PNF    Push/pull      LE PNF    Push downs      Wall push ups    Arm circles      SLS    Elbow flex/ext          Chin tuck      Stretching    UT shrugs/rolls      Gastroc/Soleus    Rocking horse      Hamstring          SKTC    Other      Piriformis          Hip flexor          Ladder pull          Pec stretch          Post deltoid           Long term goals  Time Frame for Long term goals : 6 weeks  Long term goal 1: increase ambulation with pt demonstrating proper heel-toe gait with little to no deviation  Long term goal 2: decrease pain to < 3/10 across L knee with activity  Long term goal 3: pt demonstrates independence with HEP  Patient Goals   Patient goals : to reduce knee pain  Timed Code Treatment Minutes:  60    Total Treatment Minutes:  60    Treatment/Activity Tolerance:  [x] Patient tolerated treatment well [] Patient limited by fatique  [] Patient limited by pain [] Patient limited by other medical complications  [x] Other: Patient education on energy conservation. Instruct pt to alternate seated and standing activities-Pt receptive.     Pt at 6/12 visits-Achieves both STGs  Short term goal 1: increase ambulation with pt demonstrating heel-toe progression with minimal trunk deviation-ACHIEVED  Short term goal 2: decrease pain to < 5/10 across L knee with activity-ACHIEVED  Prognosis: [] Good [x] Fair  [] Poor    Patient Requires Follow-up: [x] Yes  [] No    Plan:   [x] Continue per plan of care [] Alter current plan (see comments)  [] Plan of care initiated [] Hold pending MD visit [] Discharge    Treatment Charges: Mins Units   Initial Evaluation     Re-Evaluation     Ther Exercise         TE     Aquatic Treatment 60 4   Ther Activities        TA     Gait Training          GT     Neuro Re-education NR     Modalities     Non-Billable Service Time     Other     Total Time/Units 60 4           Electronically signed by:  Rita Salter PTA [id number]

## 2022-02-28 ENCOUNTER — HOSPITAL ENCOUNTER (OUTPATIENT)
Dept: PHYSICAL THERAPY | Age: 47
Setting detail: THERAPIES SERIES
Discharge: HOME | End: 2022-02-28
Payer: MEDICAID

## 2022-02-28 PROCEDURE — 97113 AQUATIC THERAPY/EXERCISES: CPT

## 2022-03-02 ENCOUNTER — TELEPHONE (OUTPATIENT)
Dept: ORTHOPEDIC SURGERY | Age: 47
End: 2022-03-02

## 2022-03-02 NOTE — PROGRESS NOTES
[facility]  Phone: [phone] Fax: [phone]        Physical Therapy Aquatic Flow Sheet   Date:  2022  Patient Name:  Mary Altamirano    :  1975  Restrictions/Precautions:    Diagnosis:  M17.12 (ICD-10-CM) - Localized osteoarthritis of left knee  Treatment Diagnosis:  M17.12 (ICD-10-CM) - Localized osteoarthritis of left knee  Insurance/Certification information:  [facility]  Referring Physician:  Amaris Del Valle  Plan of care signed (Y/N):    Visit# / total visits:    Pain level:      Electronically signed by:  Domingo Jerome, LILLIAN  Time TH:8128  Time RODRÍGUEZ:9600  Subjective : Pt here at  visits. She states \"I've lost 7lbs on my diet; knee feeling better. \"  Key  B= Belt DB= Dumbells T= Theratube   H= Hydrotone N= Noodles W= Weights   P= Paddles S= Speedo equipment K= Kickboard     Exercises/Activities   Warm-up/Amb  Minutes  Exercises  Minutes    Slow forward  5  HR/TR  5    Slow sideways  5  Marches  5    Slow backwards  5  Mini-squats  5    Medium forward    4-way SLR  5    Medium sideways    Hip circles/fig 8  5    Small shuffle    Hamstring curls  5    Jog    Knee extension  5    Braiding  5  Pelvic tilts  5    Bicycling    Scap squeezes          Shoulder flex/ext      Functional    Shoulder abd/add      Step    Shoulder H. abd/add      Lifting    Shoulder IR/ER      Hand to opp knee    Rowing      Push down squat    Bilateral pull down      UE PNF    Push/pull      LE PNF    Push downs      Wall push ups    Arm circles      SLS    Elbow flex/ext          Chin tuck      Stretching    UT shrugs/rolls      Gastroc/Soleus    Rocking horse      Hamstring          SKTC    Other      Piriformis          Hip flexor          Ladder pull          Pec stretch          Post deltoid           Long term goals  Time Frame for Long term goals : 6 weeks  Long term goal 1: increase ambulation with pt demonstrating proper heel-toe gait with little to no deviation  Long term goal 2: decrease pain to < 3/10 across L knee with activity  Long term goal 3: pt demonstrates independence with HEP  Patient Goals   Patient goals : to reduce knee pain  Timed Code Treatment Minutes:  60    Total Treatment Minutes:  60    Treatment/Activity Tolerance:  [x] Patient tolerated treatment well [] Patient limited by fatique  [] Patient limited by pain [] Patient limited by other medical complications  [x] Other: Patient education on energy conservation. Instruct pt to alternate seated and standing activities-Pt receptive.     Pt at 6/12 visits-Achieves both STGs  Short term goal 1: increase ambulation with pt demonstrating heel-toe progression with minimal trunk deviation-ACHIEVED  Short term goal 2: decrease pain to < 5/10 across L knee with activity-ACHIEVED  Prognosis: [] Good [x] Fair  [] Poor    Patient Requires Follow-up: [x] Yes  [] No    Plan:   [x] Continue per plan of care [] Alter current plan (see comments)  [] Plan of care initiated [] Hold pending MD visit [] Discharge    Treatment Charges: Mins Units   Initial Evaluation     Re-Evaluation     Ther Exercise         TE     Aquatic Treatment 60 4   Ther Activities        TA     Gait Training          GT     Neuro Re-education NR     Modalities     Non-Billable Service Time     Other     Total Time/Units 60 4           Electronically signed by:  Kathe Munoz PTA [id number]

## 2022-03-02 NOTE — TELEPHONE ENCOUNTER
Call placed to patient.     Future Appointments   Date Time Provider Tripp Carly   3/3/2022  2:45 PM Vincent STEPHENS PT [facility]   3/7/2022  2:45 PM Vincent STEPHENS PT [facility]   3/10/2022  2:45 PM Thuy STEPHENS PT [facility]   3/16/2022 10:45 AM Marina Landis MD [facility]   3/31/2022  8:00 AM Gama Soares MD [facility]

## 2022-03-02 NOTE — TELEPHONE ENCOUNTER
Pt called in to r/s her appt for today, she over slept. I offered the first available, she stated she needs to be seen sooner. Please, advise. [address] [place] [address] can be reached at [phone]. Thank you.

## 2022-03-03 ENCOUNTER — HOSPITAL ENCOUNTER (OUTPATIENT)
Dept: PHYSICAL THERAPY | Age: 47
Setting detail: THERAPIES SERIES
End: 2022-03-03
Payer: MEDICAID

## 2022-03-03 PROCEDURE — 97113 AQUATIC THERAPY/EXERCISES: CPT

## 2022-03-07 ENCOUNTER — HOSPITAL ENCOUNTER (OUTPATIENT)
Dept: PHYSICAL THERAPY | Age: 47
Setting detail: THERAPIES SERIES
Discharge: HOME | End: 2022-03-07
Payer: MEDICAID

## 2022-03-07 PROCEDURE — 97113 AQUATIC THERAPY/EXERCISES: CPT

## 2022-03-16 ENCOUNTER — HOSPITAL ENCOUNTER (OUTPATIENT)
Dept: GENERAL RADIOLOGY | Age: 47
Discharge: HOME | End: 2022-03-18
Payer: MEDICAID

## 2022-03-16 ENCOUNTER — OFFICE VISIT (OUTPATIENT)
Dept: ORTHOPEDIC SURGERY | Age: 47
End: 2022-03-16
Payer: MEDICAID

## 2022-03-16 VITALS — WEIGHT: 293 LBS | TEMPERATURE: 97.6 F | HEIGHT: 65 IN | BODY MASS INDEX: 48.82 KG/M2

## 2022-03-16 DIAGNOSIS — E66.01 MORBID OBESITY WITH BMI OF 50.0-59.9, ADULT (HCC): ICD-10-CM

## 2022-03-16 DIAGNOSIS — M17.12 LOCALIZED OSTEOARTHRITIS OF LEFT KNEE: Primary | ICD-10-CM

## 2022-03-16 DIAGNOSIS — M25.562 ACUTE PAIN OF LEFT KNEE: ICD-10-CM

## 2022-03-16 PROCEDURE — G8427 DOCREV CUR MEDS BY ELIG CLIN: HCPCS | Performed by: PHYSICIAN ASSISTANT

## 2022-03-16 PROCEDURE — G8484 FLU IMMUNIZE NO ADMIN: HCPCS | Performed by: PHYSICIAN ASSISTANT

## 2022-03-16 PROCEDURE — 1036F TOBACCO NON-USER: CPT | Performed by: PHYSICIAN ASSISTANT

## 2022-03-16 PROCEDURE — G8417 CALC BMI ABV UP PARAM F/U: HCPCS | Performed by: PHYSICIAN ASSISTANT

## 2022-03-16 PROCEDURE — 99212 OFFICE O/P EST SF 10 MIN: CPT

## 2022-03-16 PROCEDURE — 99213 OFFICE O/P EST LOW 20 MIN: CPT | Performed by: PHYSICIAN ASSISTANT

## 2022-03-16 PROCEDURE — 73562 X-RAY EXAM OF KNEE 3: CPT

## 2022-03-16 NOTE — PATIENT INSTRUCTIONS
Goal BMI is under 40. To achieve this based on your height, your goal weight for knee replacement surgery is 240lbs.

## 2022-03-16 NOTE — PROGRESS NOTES
Chief Complaint   Patient presents with    Follow-up     3 month f/u Left knee pain       Subjective:  Ashley Mendoza is following up for chronic left knee pain due to left knee osteoarthritis. States that she was fitted for several different kinds of braces at [address], but does not wear a brace today because she states that does not really help that much. She is active in physical therapy including aquatic therapy and HEP. She is seeing bariatrics for weight loss management. States that over-the-counter analgesics do not really help her at this point. States that her last left knee CSI only lasted for about 1 month and does not wish to continue for future injections. No new events or new complaints since last office visit. She would like total knee arthroplasty due to her severe left knee pain/osteoarthritis. Review of Systems -  all pertinent positives and negatives in HPI. Objective:    General: Alert and oriented X 3, normocephalic atraumatic, external ears and eye normal, sclera clear, no acute distress, respirations easy and unlabored with no audible wheezes, skin warm and dry, speech and dress appropriate for noted age, affect euthymic. Extremity:  Left Lower Extremity  Skin clean dry and intact, without signs of infection  Active range of motion left knee 01 10  Mild tenderness palpation along the medial joint line  No effusions or edema noted  Compartments supple throughout thigh and leg  Calf supple and nontender  Demonstrates active knee flexion/extension, ankle plantar/dorsiflexion/great toe extension. States sensation intact to touch in sural/deep peroneal/superficial peroneal/saphenous/posterior tibial nerve distributions to foot/ankle. Palpable dorsalis pedis and posterior tibialis pulses, cap refill brisk in toes, foot warm/perfused.              Temp 97.6 °F (36.4 °C) (Oral)   Ht 5' 5\" (1.651 m)   Wt (!) 311 lb (141.1 kg)   LMP 04/06/2015   BMI 51.75 kg/m²     XR:   3 views of L knee demonstrating severe medial joint line degenerative changes. No acute fractures or dislocations or any other osseus abnormality identified. Assessment:   Diagnosis Orders   1. Localized osteoarthritis of left knee  Ambulatory referral to Physical Therapy   2. Morbid obesity with BMI of 50.0-59.9, adult [facility])  Ambulatory referral to Physical Therapy       Plan:   Reviewed x-rays with patient today in office    Continue weightbearing as tolerated left lower extremity   Continue bracing   Continue ice, elevation, compression, over-the-counter analgesics as needed   Continue physical therapy. Updated referral placed today   Continue bariatric management and weight loss. Goal BMI under 40 and goal weight discussed today.  Continue remaining active, HEP   Did discuss viscosupplementation injections if needed for severe pain in the future    Follow up in PRN    Electronically signed by Derek Willis PA-C on 3/16/2022 at 11:38 AM  Note: This report was completed using [employer] voiced recognition software. Every effort has been made to ensure accuracy; however, inadvertent computerized transcription errors may be present.

## 2022-03-16 NOTE — PROGRESS NOTES
Patient had a Left knee CSI injection, she states it only lasted one month. Patient not interested in another CSI, wants to talk other options. Patient has started PT, she needs a new script to continue. PT told patient she needs a Script for shoe inserts. Patient states she has also been seeing the Weight loss doctor and she is done 7lbs, she sees him in a month.

## 2022-03-17 ENCOUNTER — HOSPITAL ENCOUNTER (OUTPATIENT)
Dept: PHYSICAL THERAPY | Age: 47
Setting detail: THERAPIES SERIES
End: 2022-03-17
Payer: MEDICAID

## 2022-03-21 PROCEDURE — 97113 AQUATIC THERAPY/EXERCISES: CPT

## 2022-03-21 NOTE — PROGRESS NOTES
[facility]  Phone: [phone] Fax: [phone]        Physical Therapy Aquatic Flow Sheet   Date:  2022  Patient Name:  Alfie Rooney    :  1975  Restrictions/Precautions:    Diagnosis:  M17.12 (ICD-10-CM) - Localized osteoarthritis of left knee  Treatment Diagnosis:  M17.12 (ICD-10-CM) - Localized osteoarthritis of left knee  Insurance/Certification information:  [facility]  Referring Physician:  Rai Vidales  Plan of care signed (Y/N):    Visit# / total visits:    Pain level:      Electronically signed by:  Jennifer Beebe PTA  Time FC:[id number]  Time ARM:7974  Subjective : Pt here at  visits. She states \"I've lost 10bs on my diet; knee feeling better. \"  Key  B= Belt DB= Dumbells T= Theratube   H= Hydrotone N= Noodles W= Weights   P= Paddles S= Speedo equipment K= Kickboard     Exercises/Activities   Warm-up/Amb  Minutes  Exercises  Minutes    Slow forward  5  HR/TR  5    Slow sideways  5  Marches  5    Slow backwards  5  Mini-squats  5    Medium forward    4-way SLR  5    Medium sideways    Hip circles/fig 8  5    Small shuffle    Hamstring curls  5    Jog    Knee extension  5    Braiding  5  Pelvic tilts  5    Bicycling    Scap squeezes          Shoulder flex/ext      Functional    Shoulder abd/add      Step    Shoulder H. abd/add      Lifting    Shoulder IR/ER      Hand to opp knee    Rowing      Push down squat    Bilateral pull down      UE PNF    Push/pull      LE PNF    Push downs      Wall push ups    Arm circles      SLS    Elbow flex/ext          Chin tuck      Stretching    UT shrugs/rolls      Gastroc/Soleus    Rocking horse      Hamstring          SKTC    Other      Piriformis          Hip flexor          Ladder pull          Pec stretch          Post deltoid           Long term goals  Time Frame for Long term goals : 6 weeks  Long term goal 1: increase ambulation with pt demonstrating proper heel-toe gait with little to no deviation  Long term goal 2: decrease pain to < 3/10 across L knee with activity  Long term goal 3: pt demonstrates independence with HEP  Patient Goals   Patient goals : to reduce knee pain  Timed Code Treatment Minutes:  60    Total Treatment Minutes:  60    Treatment/Activity Tolerance:  [x] Patient tolerated treatment well [] Patient limited by fatique  [] Patient limited by pain [] Patient limited by other medical complications  [x] Other: Patient education on energy conservation. Instruct pt to alternate seated and standing activities-Pt receptive.     Pt at 6/12 visits-Achieves both STGs  Short term goal 1: increase ambulation with pt demonstrating heel-toe progression with minimal trunk deviation-ACHIEVED  Short term goal 2: decrease pain to < 5/10 across L knee with activity-ACHIEVED  Prognosis: [] Good [x] Fair  [] Poor    Patient Requires Follow-up: [x] Yes  [] No    Plan:   [x] Continue per plan of care [] Alter current plan (see comments)  [] Plan of care initiated [] Hold pending MD visit [] Discharge    Treatment Charges: Mins Units   Initial Evaluation     Re-Evaluation     Ther Exercise         TE     Aquatic Treatment 60 4   Ther Activities        TA     Gait Training          GT     Neuro Re-education NR     Modalities     Non-Billable Service Time     Other     Total Time/Units 60 4           Electronically signed by:  Mike Nath PTA [id number]

## 2022-03-23 PROCEDURE — 97113 AQUATIC THERAPY/EXERCISES: CPT

## 2022-03-23 NOTE — PROGRESS NOTES
deviation  Long term goal 2: decrease pain to < 3/10 across L knee with activity  Long term goal 3: pt demonstrates independence with HEP  Patient Goals   Patient goals : to reduce knee pain  Timed Code Treatment Minutes:  60    Total Treatment Minutes:  60    Treatment/Activity Tolerance:  [x] Patient tolerated treatment well [] Patient limited by fatique  [] Patient limited by pain [] Patient limited by other medical complications  [x] Other: Patient education on energy conservation. Instruct pt to alternate seated and standing activities-Pt receptive.     Pt at 6/12 visits-Achieves both STGs  Short term goal 1: increase ambulation with pt demonstrating heel-toe progression with minimal trunk deviation-ACHIEVED  Short term goal 2: decrease pain to < 5/10 across L knee with activity-ACHIEVED  Prognosis: [] Good [x] Fair  [] Poor    Patient Requires Follow-up: [x] Yes  [] No    Plan:   [x] Continue per plan of care [] Alter current plan (see comments)  [] Plan of care initiated [] Hold pending MD visit [] Discharge    Treatment Charges: Mins Units   Initial Evaluation     Re-Evaluation     Ther Exercise         TE     Aquatic Treatment 60 4   Ther Activities        TA     Gait Training          GT     Neuro Re-education NR     Modalities     Non-Billable Service Time     Other     Total Time/Units 60 4           Electronically signed by:  Rashi Vaca PTA [id number]

## 2022-03-31 ENCOUNTER — OFFICE VISIT (OUTPATIENT)
Dept: BARIATRICS/WEIGHT MGMT | Age: 47
End: 2022-03-31
Payer: MEDICAID

## 2022-03-31 VITALS
SYSTOLIC BLOOD PRESSURE: 140 MMHG | WEIGHT: 293 LBS | HEART RATE: 82 BPM | HEIGHT: 66 IN | BODY MASS INDEX: 47.09 KG/M2 | TEMPERATURE: 97.2 F | DIASTOLIC BLOOD PRESSURE: 78 MMHG

## 2022-03-31 DIAGNOSIS — E66.01 CLASS 3 SEVERE OBESITY DUE TO EXCESS CALORIES WITHOUT SERIOUS COMORBIDITY WITH BODY MASS INDEX (BMI) OF 50.0 TO 59.9 IN ADULT (HCC): ICD-10-CM

## 2022-03-31 DIAGNOSIS — G89.29 CHRONIC PAIN OF LEFT KNEE: Primary | ICD-10-CM

## 2022-03-31 DIAGNOSIS — M25.562 CHRONIC PAIN OF LEFT KNEE: Primary | ICD-10-CM

## 2022-03-31 PROCEDURE — 99214 OFFICE O/P EST MOD 30 MIN: CPT | Performed by: INTERNAL MEDICINE

## 2022-03-31 PROCEDURE — 99211 OFF/OP EST MAY X REQ PHY/QHP: CPT

## 2022-03-31 RX ORDER — PHENTERMINE HYDROCHLORIDE 37.5 MG/1
37.5 TABLET ORAL
Qty: 30 TABLET | Refills: 0 | Status: SHIPPED | OUTPATIENT
Start: 2022-03-31 | End: 2022-04-30

## 2022-03-31 NOTE — PATIENT INSTRUCTIONS
Phentermine:    Take phentermine 37.5 mg, one-half to one tablet daily as needed for appetite suppression. Take each dose 30-90 min before effect will be needed. While taking phentermine, check the Blood Pressure every morning and every evening. If the systolic BP is >210 mmHg, the diastolic BP is >57 mm/Hg or the heart rate is > 100 beats per minute, do not take phentermine that day. If the systolic BP is consistently >155 mmHg, the diastolic BP is consistently above 90 mm/Hg or the heart rate is consistently > 100 beats per minute, then stop taking phentermine altogether. If the systolic BP >067 mmHg or the diastolic BP is >021 mmHg (even if it is only once), then phentermine should be stopped altogether without proving that any of these are consistently elevated.

## 2022-03-31 NOTE — PROGRESS NOTES
medication. ______________________    [facility] -  Past Medical History:   Diagnosis Date    Chronic pain of left knee     Class 3 severe obesity due to excess calories without serious comorbidity with body mass index (BMI) of 50.0 to 59.9 in adult ([facility] [address]     Enlarged thyroid      Past Surgical History:   Procedure Laterality Date    ABDOMEN SURGERY      OVARIAN CYST REMOVAL, TUBAL LIGATION, C SECTION    HERNIA REPAIR      x 2    HYSTERECTOMY, TOTAL ABDOMINAL  5/6/2015    Dr. Roberta Russ N/A 1/21/2022    RIGHT THYROID LOBECTOMY performed by Vivian Davis MD at [address]     Prior to Admission medications    Medication Sig Start Date End Date Taking? Authorizing Provider   Multiple Vitamin (MVI, CELEBRATE, CHEWABLE TABLET) Take 1 tablet by mouth daily 2/23/22   Nicolasa Fountain MD       Allergies: Allergies   Allergen Reactions    Vicodin [Hydrocodone-Acetaminophen] Hives     Family history: DM: no, Heart disease: sister and brother. 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 ROS -  Card - no CP  GI - no N/V/D/C    PE -  Gen : BP (!) 140/78 (Site: Left Upper Arm, Position: Sitting, Cuff Size: Thigh)   Pulse 82   Temp 97.2 °F (36.2 °C) (Temporal)   Ht 5' 5.5\" (1.664 m)   Wt (!) 314 lb 3.2 oz (142.5 kg)   LMP 04/06/2015   BMI 51.49 kg/m²    WN, WD, NAD  Lung: Nml resp effort, CTA B/L  Heart:  RRR w/o MGR, no pitting edema noted  Psych: Normal mood   Full affect  Neuro: Moves all ext well  ______________________  Available lab results in 'results' section was reviewed, TSH, lipid panel, bmp looks wnl, but is from 1/2020. Recent labwork result requested. HISTORY & ASSESSMENT/PLAN -     Problem 1  - Left knee pain   HPI   - Ongoing, patient states it is bothering her and preventing much of activity, and states she needs surgery but has to lose about 50 lbs and is upset about it.   Assessment  - uncontrolled   Plan   - Talked about Tylenol, she can take tylenol 1000 mg bid or tid(Ibuprofen 800 has not helped), side effects discussed. Weight reduction can help, planned as noted below. Problem 2  - Obesity   HPI   - See above Background for description    Weight  Date    318.8  1/26/22    213.2   2/23/22    314.2  3/31/22 *Phentermine  Total weight change to date: -6.6 lbs  Average daily energy variance:   1/26/22 - 2/23/22: -6.6 lbs (03191 Tam)/28 days = -825 Tam/day deficit. 2/23/2022 - 3/31/2022: +1 lbs (3500 Tam)/35 days = +100 Tam/day excess. DEN = 2214 Tam/d  Assessment  - Uncontrolled  Plan   - She feels she needs an appetite suppressant and after talking about options and adverse effects, would like to start Phentermine, has BP machine at home. Will continue diet plan otherwise. Would like to talk to bariatric surgeon, referral made. Will also get TSH, CMP, cbc, hba1c. Follow up in about a month. Problem 3  - Recent thyroid surgery (right thyroid lobectomy)   HPI   - was done for thyroid nodule on 1/21/2022, wound has healed, no issues  Assessment  - controlled  Plan   - No h/o hypo or hyperthyroidism, last TSH was 3.18 in 2020, will get TSH with bloodwork. Other management by Dr. Alan Rios and Dr. Lakesha Aguirre. Orders Placed This Encounter   Medications    phentermine (ADIPEX-P) 37.5 MG tablet     Sig: Take 1 tablet by mouth every morning (before breakfast) for 30 days.      Dispense:  30 tablet     Refill:  0     Orders Placed This Encounter   Procedures    Comprehensive Metabolic Panel     Standing Status:   Future     Standing Expiration Date:   3/31/2023    CBC with Auto Differential     Standing Status:   Future     Standing Expiration Date:   3/31/2023    T4, Free     Standing Status:   Future     Standing Expiration Date:   3/31/2023    TSH     Standing Status:   Future     Standing Expiration Date:   3/31/2023    Hemoglobin A1C     Standing Status:   Future     Standing Expiration Date:   3/31/2023     [facility] MD Aria  Internal Medicine/Obesity Medicine  3/31/2022.

## 2022-04-28 ENCOUNTER — TELEPHONE (OUTPATIENT)
Dept: BARIATRICS/WEIGHT MGMT | Age: 47
End: 2022-04-28

## 2022-04-28 NOTE — TELEPHONE ENCOUNTER
Patient missed 10am appt. Called patient left message for patient to return call to facility to reschedule.

## 2022-05-03 ENCOUNTER — HOSPITAL ENCOUNTER (OUTPATIENT)
Age: 47
Discharge: HOME | End: 2022-05-03
Payer: MEDICAID

## 2022-05-03 DIAGNOSIS — E66.01 CLASS 3 SEVERE OBESITY DUE TO EXCESS CALORIES WITHOUT SERIOUS COMORBIDITY WITH BODY MASS INDEX (BMI) OF 50.0 TO 59.9 IN ADULT (HCC): ICD-10-CM

## 2022-05-03 LAB
ALBUMIN SERPL-MCNC: 3.8 G/DL (ref 3.5–5.2)
ALP BLD-CCNC: 73 U/L (ref 35–104)
ALT SERPL-CCNC: 11 U/L (ref 0–32)
ANION GAP SERPL CALCULATED.3IONS-SCNC: 14 MMOL/L (ref 7–16)
AST SERPL-CCNC: 19 U/L (ref 0–31)
BASOPHILS ABSOLUTE: 0.02 E9/L (ref 0–0.2)
BASOPHILS RELATIVE PERCENT: 0.2 % (ref 0–2)
BILIRUB SERPL-MCNC: 0.4 MG/DL (ref 0–1.2)
BUN BLDV-MCNC: 14 MG/DL (ref 6–20)
CALCIUM SERPL-MCNC: 8.7 MG/DL (ref 8.6–10.2)
CHLORIDE BLD-SCNC: 103 MMOL/L (ref 98–107)
CO2: 21 MMOL/L (ref 22–29)
CREAT SERPL-MCNC: 0.8 MG/DL (ref 0.5–1)
EOSINOPHILS ABSOLUTE: 0.08 E9/L (ref 0.05–0.5)
EOSINOPHILS RELATIVE PERCENT: 0.7 % (ref 0–6)
GFR AFRICAN AMERICAN: >60
GFR NON-AFRICAN AMERICAN: >60 ML/MIN/1.73
GLUCOSE BLD-MCNC: 83 MG/DL (ref 74–99)
HBA1C MFR BLD: 5 % (ref 4–5.6)
HCT VFR BLD CALC: 42.2 % (ref 34–48)
HEMOGLOBIN: 13.4 G/DL (ref 11.5–15.5)
IMMATURE GRANULOCYTES #: 0.03 E9/L
IMMATURE GRANULOCYTES %: 0.3 % (ref 0–5)
LYMPHOCYTES ABSOLUTE: 2.32 E9/L (ref 1.5–4)
LYMPHOCYTES RELATIVE PERCENT: 20.8 % (ref 20–42)
MCH RBC QN AUTO: 29.3 PG (ref 26–35)
MCHC RBC AUTO-ENTMCNC: 31.8 % (ref 32–34.5)
MCV RBC AUTO: 92.1 FL (ref 80–99.9)
MONOCYTES ABSOLUTE: 0.9 E9/L (ref 0.1–0.95)
MONOCYTES RELATIVE PERCENT: 8.1 % (ref 2–12)
NEUTROPHILS ABSOLUTE: 7.83 E9/L (ref 1.8–7.3)
NEUTROPHILS RELATIVE PERCENT: 69.9 % (ref 43–80)
PDW BLD-RTO: 14.6 FL (ref 11.5–15)
PLATELET # BLD: 234 E9/L (ref 130–450)
PMV BLD AUTO: 10.6 FL (ref 7–12)
POTASSIUM SERPL-SCNC: 4.5 MMOL/L (ref 3.5–5)
RBC # BLD: 4.58 E12/L (ref 3.5–5.5)
SODIUM BLD-SCNC: 138 MMOL/L (ref 132–146)
T4 FREE: 1.11 NG/DL (ref 0.93–1.7)
TOTAL PROTEIN: 7.8 G/DL (ref 6.4–8.3)
TSH SERPL DL<=0.05 MIU/L-ACNC: 3.07 UIU/ML (ref 0.27–4.2)
WBC # BLD: 11.2 E9/L (ref 4.5–11.5)

## 2022-05-03 PROCEDURE — 36415 COLL VENOUS BLD VENIPUNCTURE: CPT

## 2022-05-03 PROCEDURE — 84439 ASSAY OF FREE THYROXINE: CPT

## 2022-05-03 PROCEDURE — 84443 ASSAY THYROID STIM HORMONE: CPT

## 2022-05-03 PROCEDURE — 85025 COMPLETE CBC W/AUTO DIFF WBC: CPT

## 2022-05-03 PROCEDURE — 83036 HEMOGLOBIN GLYCOSYLATED A1C: CPT

## 2022-05-03 PROCEDURE — 80053 COMPREHEN METABOLIC PANEL: CPT

## 2022-05-27 ENCOUNTER — OFFICE VISIT (OUTPATIENT)
Dept: BARIATRICS/WEIGHT MGMT | Age: 47
End: 2022-05-27
Payer: MEDICAID

## 2022-05-27 VITALS
WEIGHT: 293 LBS | TEMPERATURE: 97.3 F | SYSTOLIC BLOOD PRESSURE: 144 MMHG | HEART RATE: 94 BPM | HEIGHT: 66 IN | BODY MASS INDEX: 47.09 KG/M2 | DIASTOLIC BLOOD PRESSURE: 90 MMHG

## 2022-05-27 DIAGNOSIS — R03.0 ELEVATED BLOOD PRESSURE READING: ICD-10-CM

## 2022-05-27 DIAGNOSIS — E66.01 CLASS 3 SEVERE OBESITY DUE TO EXCESS CALORIES WITHOUT SERIOUS COMORBIDITY WITH BODY MASS INDEX (BMI) OF 45.0 TO 49.9 IN ADULT (HCC): ICD-10-CM

## 2022-05-27 DIAGNOSIS — M25.562 CHRONIC PAIN OF LEFT KNEE: Primary | ICD-10-CM

## 2022-05-27 DIAGNOSIS — G89.29 CHRONIC PAIN OF LEFT KNEE: Primary | ICD-10-CM

## 2022-05-27 PROCEDURE — 99211 OFF/OP EST MAY X REQ PHY/QHP: CPT

## 2022-05-27 PROCEDURE — 99213 OFFICE O/P EST LOW 20 MIN: CPT | Performed by: INTERNAL MEDICINE

## 2022-05-27 NOTE — PROGRESS NOTES
CC -   Follow up of: Left knee pain, weight gain    BACKGROUND -   Last visit: 3/31/22  First visit: 1/26/22     Obesity (all weight in lbs)  Began after having children (1995)  Initial BMI 52.24, Wt 318.75  HS Grad wt 160   Lowest   wt 160   Highest  wt 318.75  Pattern of wt gain: gradual with some fluctuation  Wt change past yr: similar to now  Most wt lost: none  Other diets attempted: not tried    Desire to lose weight: 10/10  Problem posed by appetite: 1-2/10    Initial Diet:    Number of meals per day - 1    Number of snacks per day - 2-3    Meal volume - 12\" plate, no seconds    Fast food/convenience store - 2x/week (eg sandwich or 2 slices of pizza)    Restaurants (not fast food) - 0x/week   Sweets - 3d/week (ice cream)   Chips - 7d/week (~160 Tam)   Crackers/pretzels - 0d/week   Nuts - 0d/week   Peanut Butter - 0-1d/week   Popcorn - 0d/week   Dried fruit - 0d/week   Whole fruit - 7d/week (watermelon, cantaloupes, apples, berries, grapes)   Breakfast cereal - 0d/week   Granola/Protein/Energy bar - 0d/week   Sugar sweetened beverages - occasional orange juice, 2-3 glasses of gingerale everyday (8-12 oz in total of gingerale + ice). Protein - No supplements   Fiber - No supplements    Snacks: cheese+crackers, and noodle etc     Initial exercise:    Gym membership - no    Walking - no    Running - no    Resistance - no    Aerobic class - no        Initial sleep: Bedtime: 3-4am, wake up time: 9am - wakes up rested, daytime naps: no daytime naps    Weight scale at home: yes, takes weight: 1x/month  Food scale: no    Follow up 3/31/22:  1. Fatigue: not really    2. Diet: has continued with previous plan, protein with chicken mostly (bid), not taking much fiber, but does take non-starchy low calorie vegetables, taking >80 oz water daily. 3. Exercise: weight ball, cowbell, foot pedals 30-45 min daily. 4. Sleep: sleeping ok, no daytime naps. 5. Medications: taking MVI, otherwise no medication.       Follow up 5/27/22:  1. Fatigue: Denies. 2. Diet: Protein: Chicken (for lunch and dinner), breakfast - depends, smt egg but not big breakfast eater. Green vegetables for dinner. Drinks about 3-4 quarts/day. BM regular. 3. Exercise: has been minimum because of the knee, not doing foot pedals much. 4. Sleep: Bedtime - 3-4 am, wake up 9 am - same, no daytime naps, feels rested. 5. Medications: Phentermine did help but caused nausea, but has finished. Takes multivitamin, does not take OTC tylenol or ibuprofen - just deals with the knee pain.      ______________________    [facility] -  Past Medical History:   Diagnosis Date    Chronic pain of left knee     Class 3 severe obesity due to excess calories without serious comorbidity with body mass index (BMI) of 50.0 to 59.9 in adult ([facility] [address]     Enlarged thyroid      Past Surgical History:   Procedure Laterality Date    ABDOMEN SURGERY      OVARIAN CYST REMOVAL, TUBAL LIGATION, C SECTION    HERNIA REPAIR      x 2    HYSTERECTOMY, TOTAL ABDOMINAL  5/6/2015    Dr. Kendell Nicolas N/A 1/21/2022    RIGHT THYROID LOBECTOMY performed by Darrell Murray MD at [address]     Prior to Admission medications    Medication Sig Start Date End Date Taking? Authorizing Provider   Multiple Vitamin (MVI, CELEBRATE, CHEWABLE TABLET) Take 1 tablet by mouth daily 2/23/22   Kamron Don MD       Allergies: Allergies   Allergen Reactions    Vicodin [Hydrocodone-Acetaminophen] Hives     Family history: DM: no, Heart disease: sister and brother. 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     ROS -  Card - no CP  GI - no N/V/D/C    PE -  Gen : BP (!) 144/90 (Site: Left Upper Arm, Position: Sitting, Cuff Size: Thigh)   Pulse 94   Temp 97.3 °F (36.3 °C) (Temporal)   Ht 5' 5.5\" (1.664 m)   Wt (!) 302 lb (137 kg)   LMP 04/06/2015   BMI 49.49 kg/m²    Repeat BP: 151/90, HR85   WN, WD, NAD  Lung: Nml resp effort, CTA asymptomatic. Assessment  - uncontrolled. Plan   - Need to monitor BP, may need to treat if constantly elevated. Prior BP had been normal.    Total time spent on this encounter: 20 min. Norman Aguilar MD  Internal Medicine/Obesity Medicine  5/27/2022.

## 2022-05-27 NOTE — PATIENT INSTRUCTIONS
Fiber ~20-22 gm with supplement (Dextrin powder, fiber one original cereal or fiber gummies). Otherwise continue current diet plan. Follow up in 1 month.

## 2022-06-10 ENCOUNTER — INITIAL CONSULT (OUTPATIENT)
Dept: BARIATRICS/WEIGHT MGMT | Age: 47
End: 2022-06-10
Payer: MEDICAID

## 2022-06-10 ENCOUNTER — TELEPHONE (OUTPATIENT)
Dept: BARIATRICS/WEIGHT MGMT | Age: 47
End: 2022-06-10

## 2022-06-10 VITALS
HEIGHT: 65 IN | TEMPERATURE: 97.8 F | BODY MASS INDEX: 48.82 KG/M2 | RESPIRATION RATE: 20 BRPM | DIASTOLIC BLOOD PRESSURE: 112 MMHG | HEART RATE: 79 BPM | SYSTOLIC BLOOD PRESSURE: 171 MMHG | WEIGHT: 293 LBS

## 2022-06-10 DIAGNOSIS — K21.9 GASTROESOPHAGEAL REFLUX DISEASE WITHOUT ESOPHAGITIS: ICD-10-CM

## 2022-06-10 DIAGNOSIS — K30 INDIGESTION: Primary | ICD-10-CM

## 2022-06-10 DIAGNOSIS — E66.01 MORBID OBESITY DUE TO EXCESS CALORIES (HCC): ICD-10-CM

## 2022-06-10 PROCEDURE — 99204 OFFICE O/P NEW MOD 45 MIN: CPT | Performed by: SURGERY

## 2022-06-10 PROCEDURE — G8427 DOCREV CUR MEDS BY ELIG CLIN: HCPCS | Performed by: SURGERY

## 2022-06-10 PROCEDURE — G8417 CALC BMI ABV UP PARAM F/U: HCPCS | Performed by: SURGERY

## 2022-06-10 PROCEDURE — 99202 OFFICE O/P NEW SF 15 MIN: CPT

## 2022-06-10 PROCEDURE — 1036F TOBACCO NON-USER: CPT | Performed by: SURGERY

## 2022-06-10 RX ORDER — SODIUM CHLORIDE 0.9 % (FLUSH) 0.9 %
5-40 SYRINGE (ML) INJECTION PRN
Status: CANCELLED | OUTPATIENT
Start: 2022-06-10

## 2022-06-10 RX ORDER — SODIUM CHLORIDE 9 MG/ML
INJECTION, SOLUTION INTRAVENOUS PRN
Status: CANCELLED | OUTPATIENT
Start: 2022-06-10

## 2022-06-10 RX ORDER — SODIUM CHLORIDE 9 MG/ML
INJECTION, SOLUTION INTRAVENOUS CONTINUOUS
Status: CANCELLED | OUTPATIENT
Start: 2022-06-10

## 2022-06-10 RX ORDER — SODIUM CHLORIDE 0.9 % (FLUSH) 0.9 %
5-40 SYRINGE (ML) INJECTION EVERY 12 HOURS SCHEDULED
Status: CANCELLED | OUTPATIENT
Start: 2022-06-10

## 2022-06-10 NOTE — PROGRESS NOTES
Ashley Polanco  6/10/2022  [facility]    Initial Evaluation  Bariatric Surgery and EGD       Subjective:  CHIEF COMPLAINT: Morbid obesity, malnutrition, GERD, Degenerative Joint Disease (DJD) and Extremity Edema    HISTORY OF PRESENT ILLNESS: Melina Hunt is a morbidly-obese 49 y.o.  female, who weighs 299 lb (135.6 kg). She is 144 pounds over her ideal body weight. The severity is severe with Body mass index is 49.76 kg/m². She has multiple medical problems aggravated by her obesity. They have been overweight since age 24. She wishes to have bariatric surgery so that she can lose a large amount of weight to a goal of their ideal body weight based on height, build and sex, and keep the weight off. I have met with [facility] in [facility] where we discussed the surgery in great detail and went over the risks and benefits. She has watched our informational video so she understands all of the extensive risks involved. She states that she understands all of the risks and wishes to proceed with the evaluation. We have discussed the different surgical options in detail with use of diagrams and drawings to detail the procedures, risks and alternatives. We discussed the postoperative diet and proposed life long diet for weight management after surgery.      They are a nonsmoker  They have no significant exposure to second hand smoke    Hx of MESH PLACEMENT AND REVISIONAL INCISIONAL HERNIA    Past Medical History  Patient Active Problem List   Diagnosis    Teratoma of right ovary    Hydrosalpinx    Menorrhagia    Class 3 severe obesity due to excess calories without serious comorbidity with body mass index (BMI) of 45.0 to 49.9 in adult 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 Isabel Menon N/A 1/21/2022    RIGHT THYROID LOBECTOMY performed by Nery Cristina MD at [facility] OR      Allergies: Vicodin [hydrocodone-acetaminophen]     Home Medications  Current Outpatient Medications   Medication Sig Dispense Refill    Multiple Vitamin (MVI, CELEBRATE, CHEWABLE TABLET) Take 1 tablet by mouth daily 90 tablet 1     No current facility-administered medications for this visit. Social History:   TOBACCO:   reports that she has quit smoking. She has never used smokeless tobacco.  All smokers must join the free smoking cessation program and stop smoking for 3 months before having any Bariatric surgery. ETOH:    reports previous alcohol use. The patient has a family history that is negative for severe cardiovascular or respiratory issues, negative for reaction to anesthesia. Review of Systems    A complete 10 system review was performed and are otherwise negative unless mentioned in the above HPI. Specific negatives are listed below but may not include all those reviewed.     General ROS: negative obtundation, AMS  ENT ROS: negative rhinorrhea, epistaxis  Allergy and Immunology ROS: negative itchy/watery eyes or nasal congestion  Hematological and Lymphatic ROS: negative spontaneous bleeding or bruising  Endocrine ROS: negative  lethargy, mood swings, palpitations or polydipsia/polyuria  Respiratory ROS: negative sputum changes, stridor, tachypnea or wheezing  Cardiovascular ROS: negative for - loss of consciousness, murmur or orthopnea  Gastrointestinal ROS: negative for - hematochezia or hematemesis  Genito-Urinary ROS: negative for -  genital discharge or hematuria  Musculoskeletal ROS: negative for - focal weakness, gangrene  Psych/Neuro ROS: negative for - visual or auditory hallucinations, suicidal ideation      Physical Exam:   VITALS: BP (!) 171/112 (Site: Right Lower Arm, Position: Sitting, Cuff Size: Medium Adult)   Pulse 79   Temp 97.8 °F (36.6 °C) (Temporal)   Resp 20   Ht 5' 5\" (1.651 m)   Wt 299 lb (135.6 kg)   LMP 04/06/2015   BMI 49.76 kg/m²   General appearance: AAO, NAD  Eyes: PERRL, EOMI, red conjunctiva  Lungs: equal chest rise bilateral, no wheeze, no rhonchi  Chest wall: atraumatic, no tenderness, no echymosis or abrasions  Heart: reg rate, no murmur  Abdomen: soft, nondistended, nontender, obese  Extremities: full ROM all 4 ext, no gross motor or sensory deficits  Pulses: 2+ distal  Skin: warm and dry  Neurologic: spontanous eye opening, purposeful, follows complex commands      Assessment:  Morbid obesity with inability to keep the weight off with diet and exercise. She is interested in Laparoscopic Chantel-en- Y Gastric Bypass or Sleeve Gastrectomy. We discussed that our goal is to ameliorate the medical problems and not to obtain a specific body mass index. She understands the risks and benefits, and wishes to proceed with the evaluation. Plan:  Psych evaluation, medical clearance, gallbladder ultrasound. These patients often have vitamin deficiencies so I will do screening labs for malnutrition. I will do an upper endoscopy to check for hiatal hernias, ulcers and H. Pylori while we wait for insurance approval for the surgery. I have spent over 45min in evaluation of the patient including greater than 50% of that time face to face discussing surgical options, medical and surgical history, plans for medical weight loss management and preoperative clearance for surgery. They did not have family present for the discussion and visual aides and drawings were used to explain anatomy and surgical procedures.      Physician Signature: Electronically signed by Dr. Jarrod Espinal MD    Send copy of H&P to PCP, Luisa Mejia DO

## 2022-06-10 NOTE — TELEPHONE ENCOUNTER
Prior Authorization Form  DEMOGRAPHICS:    Patient Name:  Blayne Celis  Patient :  1975            Insurance:  Payor: [facility] PL / Plan: [employer] / Product Type: *No Product type* /   Insurance ID Number:    Payor/Plan Subscr  Sex Relation Sub.  Ins. ID Effective Group Num   1. 1 [employer]  1975 Female Self [id number]                                   [address]         DIAGNOSIS & PROCEDURE:    Procedure/Operation: egd           CPT Code: 32588    Diagnosis:  gerd    ICD10 Code: k21.9    [facility]    Surgeon:  Bharathi Trevino INFORMATION:    Date: 22    Time:               Anesthesia: Mac                                                       Status:  Outpatient        Special Comments:         Electronically signed by Hugh Mathew MA on 6/10/2022 at 12:51 PM

## 2022-06-10 NOTE — PATIENT INSTRUCTIONS
What is the next step to proceed with weight loss surgery? Please be aware that any co-pays or deductibles may be requested prior to testing and / or procedures. You will need to schedule a psychological evaluation for weight loss surgery. Patients will be required to complete all psychological testing as required by the mental health provider. Patients must also follow all of the provider's recommendations before weight loss surgery can be scheduled. The evaluation must be done a standard way for weight loss surgery. We strongly recommend that you contact one of our preferred providers listed below to arrange this:      Joe Aguirre, [facility]  [address] [address], [place]   [phone]    [facility] and [address]   [phone]    Dr. Christina Mullins, PhD    Katerin Crowder. [place], [place]    [phone]      You will also need to plan on attending a 2 hour nutrition class at the [facility] prior to your surgery. We will schedule this for you when we schedule your surgery. Please remember to have your labs drawn 10 days prior to your first scheduled dietary appointment. Please remember, that while we will submit your case to insurance for surgery authorization, it is your responsibility to know if your plan covers weight loss surgery and keep up-to-date with changes to your insurance coverage. We will do everything possible to help you get approved for weight loss surgery, but cannot guarantee an approval.     Please note that you will not be submitted to your insurance company until all pre-operative testing requirements are met.

## 2022-06-13 ENCOUNTER — TELEPHONE (OUTPATIENT)
Dept: BARIATRICS/WEIGHT MGMT | Age: 47
End: 2022-06-13

## 2022-06-13 NOTE — TELEPHONE ENCOUNTER
SW called PT regarding  psych eval appointment but PT was unavailable. Left v-mail requesting that the PT call to schedule the appointment.      GERRY Bryan

## 2022-06-28 ENCOUNTER — HOSPITAL ENCOUNTER (OUTPATIENT)
Age: 47
Discharge: HOME | End: 2022-06-28
Payer: MEDICAID

## 2022-06-28 ENCOUNTER — OFFICE VISIT (OUTPATIENT)
Dept: BARIATRICS/WEIGHT MGMT | Age: 47
End: 2022-06-28
Payer: MEDICAID

## 2022-06-28 VITALS
SYSTOLIC BLOOD PRESSURE: 139 MMHG | HEART RATE: 68 BPM | WEIGHT: 293 LBS | HEIGHT: 66 IN | DIASTOLIC BLOOD PRESSURE: 79 MMHG | TEMPERATURE: 97.4 F | BODY MASS INDEX: 47.09 KG/M2

## 2022-06-28 DIAGNOSIS — G89.29 CHRONIC PAIN OF LEFT KNEE: Primary | ICD-10-CM

## 2022-06-28 DIAGNOSIS — E66.01 MORBID OBESITY DUE TO EXCESS CALORIES (HCC): ICD-10-CM

## 2022-06-28 DIAGNOSIS — E66.01 CLASS 3 SEVERE OBESITY DUE TO EXCESS CALORIES WITHOUT SERIOUS COMORBIDITY WITH BODY MASS INDEX (BMI) OF 45.0 TO 49.9 IN ADULT (HCC): ICD-10-CM

## 2022-06-28 DIAGNOSIS — F32.9 REACTIVE DEPRESSION: ICD-10-CM

## 2022-06-28 DIAGNOSIS — M25.562 CHRONIC PAIN OF LEFT KNEE: Primary | ICD-10-CM

## 2022-06-28 LAB
ALBUMIN SERPL-MCNC: 3.6 G/DL (ref 3.5–5.2)
ALP BLD-CCNC: 64 U/L (ref 35–104)
ALT SERPL-CCNC: 11 U/L (ref 0–32)
ANION GAP SERPL CALCULATED.3IONS-SCNC: 12 MMOL/L (ref 7–16)
AST SERPL-CCNC: 15 U/L (ref 0–31)
BILIRUB SERPL-MCNC: 0.4 MG/DL (ref 0–1.2)
BUN BLDV-MCNC: 14 MG/DL (ref 6–20)
CALCIUM SERPL-MCNC: 8.3 MG/DL (ref 8.6–10.2)
CHLORIDE BLD-SCNC: 109 MMOL/L (ref 98–107)
CHOLESTEROL, TOTAL: 183 MG/DL (ref 0–199)
CO2: 21 MMOL/L (ref 22–29)
CREAT SERPL-MCNC: 0.8 MG/DL (ref 0.5–1)
FERRITIN: 225 NG/ML
FOLATE: 6.2 NG/ML (ref 4.8–24.2)
GFR AFRICAN AMERICAN: >60
GFR NON-AFRICAN AMERICAN: >60 ML/MIN/1.73
GLUCOSE BLD-MCNC: 88 MG/DL (ref 74–99)
HBA1C MFR BLD: 5 % (ref 4–5.6)
HCT VFR BLD CALC: 38 % (ref 34–48)
HEMOGLOBIN: 12.4 G/DL (ref 11.5–15.5)
MCH RBC QN AUTO: 29.5 PG (ref 26–35)
MCHC RBC AUTO-ENTMCNC: 32.6 % (ref 32–34.5)
MCV RBC AUTO: 90.3 FL (ref 80–99.9)
PDW BLD-RTO: 14.6 FL (ref 11.5–15)
PLATELET # BLD: 218 E9/L (ref 130–450)
PMV BLD AUTO: 10.2 FL (ref 7–12)
POTASSIUM SERPL-SCNC: 3.9 MMOL/L (ref 3.5–5)
PREALBUMIN: 21 MG/DL (ref 20–40)
RBC # BLD: 4.21 E12/L (ref 3.5–5.5)
SODIUM BLD-SCNC: 142 MMOL/L (ref 132–146)
TOTAL PROTEIN: 7.2 G/DL (ref 6.4–8.3)
TRIGL SERPL-MCNC: 107 MG/DL (ref 0–149)
VITAMIN B-12: 437 PG/ML (ref 211–946)
VITAMIN D 25-HYDROXY: 14 NG/ML (ref 30–100)
WBC # BLD: 8.6 E9/L (ref 4.5–11.5)

## 2022-06-28 PROCEDURE — 99214 OFFICE O/P EST MOD 30 MIN: CPT | Performed by: INTERNAL MEDICINE

## 2022-06-28 PROCEDURE — 36415 COLL VENOUS BLD VENIPUNCTURE: CPT

## 2022-06-28 PROCEDURE — 84478 ASSAY OF TRIGLYCERIDES: CPT

## 2022-06-28 PROCEDURE — 82728 ASSAY OF FERRITIN: CPT

## 2022-06-28 PROCEDURE — 84134 ASSAY OF PREALBUMIN: CPT

## 2022-06-28 PROCEDURE — 82306 VITAMIN D 25 HYDROXY: CPT

## 2022-06-28 PROCEDURE — 82465 ASSAY BLD/SERUM CHOLESTEROL: CPT

## 2022-06-28 PROCEDURE — 84630 ASSAY OF ZINC: CPT

## 2022-06-28 PROCEDURE — 80053 COMPREHEN METABOLIC PANEL: CPT

## 2022-06-28 PROCEDURE — 82746 ASSAY OF FOLIC ACID SERUM: CPT

## 2022-06-28 PROCEDURE — 84425 ASSAY OF VITAMIN B-1: CPT

## 2022-06-28 PROCEDURE — 99211 OFF/OP EST MAY X REQ PHY/QHP: CPT

## 2022-06-28 PROCEDURE — 85027 COMPLETE CBC AUTOMATED: CPT

## 2022-06-28 PROCEDURE — 82607 VITAMIN B-12: CPT

## 2022-06-28 PROCEDURE — 83036 HEMOGLOBIN GLYCOSYLATED A1C: CPT

## 2022-06-28 RX ORDER — BUPROPION HYDROCHLORIDE 150 MG/1
150 TABLET ORAL EVERY MORNING
Qty: 30 TABLET | Refills: 3 | Status: SHIPPED
Start: 2022-06-28 | End: 2022-09-12 | Stop reason: ALTCHOICE

## 2022-06-28 NOTE — PROGRESS NOTES
CC -   Follow up of: Left knee pain, weight gain    BACKGROUND -   Last visit: 5/27/22  First visit: 1/26/22     Obesity (all weight in lbs)  Began after having children (1995)  Initial BMI 52.24, Wt 318.75  HS Grad wt 160   Lowest   wt 160   Highest  wt 318.75  Pattern of wt gain: gradual with some fluctuation  Wt change past yr: similar to now  Most wt lost: none  Other diets attempted: not tried    Desire to lose weight: 10/10  Problem posed by appetite: 1-2/10    Initial Diet:    Number of meals per day - 1    Number of snacks per day - 2-3    Meal volume - 12\" plate, no seconds    Fast food/convenience store - 2x/week (eg sandwich or 2 slices of pizza)    Restaurants (not fast food) - 0x/week   Sweets - 3d/week (ice cream)   Chips - 7d/week (~160 Tam)   Crackers/pretzels - 0d/week   Nuts - 0d/week   Peanut Butter - 0-1d/week   Popcorn - 0d/week   Dried fruit - 0d/week   Whole fruit - 7d/week (watermelon, cantaloupes, apples, berries, grapes)   Breakfast cereal - 0d/week   Granola/Protein/Energy bar - 0d/week   Sugar sweetened beverages - occasional orange juice, 2-3 glasses of gingerale everyday (8-12 oz in total of gingerale + ice). Protein - No supplements   Fiber - No supplements    Snacks: cheese+crackers, and noodle etc     Initial exercise:    Gym membership - no    Walking - no    Running - no    Resistance - no    Aerobic class - no        Initial sleep: Bedtime: 3-4am, wake up time: 9am - wakes up rested, daytime naps: no daytime naps    Weight scale at home: yes, takes weight: 1x/month  Food scale: no    Follow up 3/31/22:  1. Fatigue: not really    2. Diet: has continued with previous plan, protein with chicken mostly (bid), not taking much fiber, but does take non-starchy low calorie vegetables, taking >80 oz water daily. 3. Exercise: weight ball, cowbell, foot pedals 30-45 min daily. 4. Sleep: sleeping ok, no daytime naps. 5. Medications: taking MVI, otherwise no medication.       Follow up 5/27/22:  1. Fatigue: Denies. 2. Diet: Protein: Chicken (for lunch and dinner), breakfast - depends, smt egg but not big breakfast eater. Green vegetables for dinner. Drinks about 3-4 quarts/day. BM regular. 3. Exercise: has been minimum because of the knee, not doing foot pedals much. 4. Sleep: Bedtime - 3-4 am, wake up 9 am - same, no daytime naps, feels rested. 5. Medications: Phentermine did help but caused nausea, but has finished. Takes multivitamin, does not take OTC tylenol or ibuprofen - just deals with the knee pain. Follow up 6/28/22:  1. Fatigue: Last 2 weeks have been hard due to a tragedy. 2. Diet: pretty much the same. Chicken mostly, for protein. Was taking Fiber gummies, and also green vegetables. Water about 3-4 quarts a day. 3. Exercise: No exercise in the past couple of weeks. Was going to pool prior to that. 4. Sleep: sleep has been disturbed in the last 2 weeks. 5. Medications: Phentermine was helping except it caused nause, did take it for a month and it was helping (filled 3/31/22). Takes MVI daily, does not take anything for knee pain.    ______________________    [facility] -  Past Medical History:   Diagnosis Date    Chronic pain of left knee     Class 3 severe obesity due to excess calories without serious comorbidity with body mass index (BMI) of 50.0 to 59.9 in adult ([facility] [address]     Enlarged thyroid     Morbid obesity due to excess calories ([facility] [address]      Past Surgical History:   Procedure Laterality Date    ABDOMEN SURGERY      OVARIAN CYST REMOVAL, TUBAL LIGATION, C SECTION    HERNIA REPAIR      x 2    HYSTERECTOMY, TOTAL ABDOMINAL (CERVIX REMOVED)  5/6/2015    Dr. Hannah Maldonado N/A 1/21/2022    RIGHT THYROID LOBECTOMY performed by Yaron Khan MD at [facility]     Prior to Admission medications    Medication Sig Start Date End Date Taking?  Authorizing Provider   Multiple Vitamin (MVI, CELEBRATE, CHEWABLE TABLET) Take 1 tablet by mouth daily 2/23/22 Mele Romo MD       Allergies: Allergies   Allergen Reactions    Vicodin [Hydrocodone-Acetaminophen] Hives     Family history: DM: no, Heart disease: sister and brother. 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 ROS -  Card - no CP  GI - no N/V/D/C    PE -  Gen : /79 (Site: Left Upper Arm, Position: Sitting, Cuff Size: Large Adult)   Pulse 68   Temp 97.4 °F (36.3 °C) (Temporal)   Ht 5' 5.5\" (1.664 m)   Wt (!) 303 lb 6.4 oz (137.6 kg)   LMP 04/06/2015   BMI 49.72 kg/m²    Repeat BP: 151/90, HR85   WN, WD, NAD  Lung: Nml resp effort, CTA B/L  Heart:  RRR w/o MGR, no pitting edema noted  Psych: Normal mood   Full affect  Neuro: Moves all ext well  ______________________  Available lab results in 'results' section was reviewed, TSH, lipid panel, bmp wnl, from 5/3/2020. HISTORY & ASSESSMENT/PLAN -     Problem 1  - Left knee pain   HPI   - Ongoing, currently doing fair with some activity, needs to lose about 40 lbs to undergo surgery(<278 lbs). Assessment  - fairly controlled   Plan   - Currently does not take any medications. Weight reduction seems to be helping some even though she has pain she is just dealing with it without medications. Weight reduction planned as noted below. Problem 2  - Obesity   HPI   - See above Background for description    Weight  Date    318.8  1/26/22    213.2   2/23/22    314.2  3/31/22 *Phentermine    302.0  5/27/22    303.4  6/28/22 Wellbutrin    Total weight change to date: -15.4 lbs  Average daily energy variance:   1/26/22 - 2/23/22: -6.6 lbs (65270 Tam)/28 days = -825 Tam/day deficit. 2/23/2022 - 3/31/2022: +1 lbs (3500 Tam)/35 days = +100 Tam/day excess. 3/31/2022 - 5/27/2022: -12.2 lbs (29451 Tam)/56 days = -763 Tam/day deficit. 5/27/2022 - 6/28/2022: +1.4 lbs (4900 Tam)/31 days = +158 Tam/day excess.     DEN = 2214 Tam/d    Assessment  - improving overall, except some weight gain this month    Plan   - Overall she is doing ok. Talked about protein fiber and water intake, recommended fiber supplement. Feels she needs an appetite suppressant, and after talking about options and adverse effects, we planned on starting Wellbutrin. Would like to follow up in 6 weeks. Problem 3  - Recent thyroid surgery (right thyroid lobectomy)   HPI   - was done for thyroid nodule on 1/21/2022, wound has healed, no issues  Assessment  - controlled  Plan   - No h/o hypo or hyperthyroidism, last TSH was 3.07 in 2022 and FT4 1. 11. Other management by Dr. Minesh Woodward and Dr. Anshul Sandoval. Problem 4 - Elevated blood pressure   HPI   -No h/o HTN in the past, noted elevated BP today - repeat BP was better though. Not on any prescription medication currently, pt asymptomatic. Assessment  - uncontrolled. Plan   - Need to monitor BP, may need to treat if constantly elevated. Prior BP had been normal.    Orders Placed This Encounter   Medications    buPROPion (WELLBUTRIN XL) 150 MG extended release tablet     Sig: Take 1 tablet by mouth every morning     Dispense:  30 tablet     Refill:  3       Jess Stover MD  Internal Medicine/Obesity Medicine  6/28/2022.

## 2022-06-28 NOTE — PATIENT INSTRUCTIONS
Wellbutrin:  Take Wellbutrin XL (bupropion) 150 mg daily in the morning. Call if any persisting side effects. Follow up in about 6 weeks.

## 2022-06-29 ENCOUNTER — SCHEDULED TELEPHONE ENCOUNTER (OUTPATIENT)
Dept: BARIATRICS/WEIGHT MGMT | Age: 47
End: 2022-06-29

## 2022-06-29 DIAGNOSIS — Z71.3 DIETARY COUNSELING: ICD-10-CM

## 2022-06-29 DIAGNOSIS — E66.01 MORBID OBESITY DUE TO EXCESS CALORIES (HCC): ICD-10-CM

## 2022-06-29 DIAGNOSIS — Z87.891 FORMER SMOKER: Primary | ICD-10-CM

## 2022-06-29 PROCEDURE — 99999 PR OFFICE/OUTPT VISIT,PROCEDURE ONLY: CPT

## 2022-06-29 NOTE — PROGRESS NOTES
[address] [facility]:  Initial Nutrition Consultation    Today's Date: 6/29/2022  Patients Name:Ashley Polanco     Height:   5' 5\"   Weight:   303 lbs    IBW: 155 lbs   %IBW: 195 lbs   Excess Weight: 148 lbs  BMI:  50.4     Subjective Information:   Patient has tried multiple means of weight loss including diet and exercise in the past and has been unable to maintain a healthy weight. Pt states he / she has tried the following: Cut back on portions, avoid soda. Patients overall goal is to be able to lose weight with diet and exercise by changing lifestyle, habits and maintain a healthy weight for a lifetime. Are your currently Diabetic no  Last Blood Glucose Reading  88 WNL on 6/28/22  Last HGBA1C 5.0 WNL on 6/28/22    Patient states the following will be the most difficult change after weight loss surgery? Pureed foods for 4 weeks    Most successful diet in the past? Cut back on portions, avoiding soda   Length of time patient was on the diet listed above? 5 months  Weight lost on the diet listed above? 20 lbs   Patient stated he / she maintained his / her weight for the following time? A few months     Patient takes the following vitamins, minerals and dietary supplements? Multivitamin Daily - Gummies, Fiber Gummies    Patient consumes the following beverage daily? Water, ginger ale - regular    6/28/22 Pre-Op Labs  Calcium 8.3L, vitamin D 14 L, Hgb 12.4 WNL, Hct 38.0 WNL, Ferritin 225 WNL  Note:  Pt had low vit D. Per Dr Jannet Hunt, pt instructed to see her PCP and treat vit D def per her PCP. No recheck of vit D required prior to surgery per Dr Jannet Hunt. Patient states he / she has the following problems:  no - Eating  no - Chewing  no - Swallowing  no - Nausea  no - Vomiting  no - Diarrhea  no - Constipation  no - Hair Changes  no - Skin Changes    Food Allergies: no    Food Intolerances: no     Yes - Past medically assisted weight loss history reviewed.   Yes -

## 2022-06-29 NOTE — PATIENT INSTRUCTIONS
[facility]  Dietary Initial Appointment Patient Instructions    By: Ramon Garcia RD / KASH  [phone]      At your initial dietary appointment you have received the following information packets:    Please be aware at each visit you have been instructed that in order for your insurance company to approve your surgery you must show a consistent weight loss of 2 lbs or greater at each visit. We can not guarantee an approval by your insurance company we can only provide the information given to us it is up to you the patient to show compliancy to your insurance company. If you do gain weight during your supervised weight loss counseling sessions insurance companies starting in 2018 are denying patients for not showing consistent weight loss results when part of a supervised weight loss counseling program. Pt was instructed on 6/29/22. Pt verbalized understanding. · Low-Fat Diet  - Please be sure to begin your low-fat diet from today's date until your scheduled surgery date. If you are not at goal weight by your history and physical appointment with your surgeon your surgery will be cancelled. · Goal Weight: 285 lbs (BMI of 47.4)  · Low-Fat Diet Contract - Patient Acknowledge and Signed  · Supplement List - Please be sure to be saving to purchase your 3 month supply of supplements. If you do not bring supplements to your history and physical appointment your surgery will be cancelled. · Supplement Contract - Patient Acknowledge and Signed  · Please be sure to take a daily Multi-vitamin from now until surgery to reduce your incidence of infection. · If your insurance company requires additional dietary counseling you will be scheduled to see the dietitian the following month. ·  If your psychological evaluation is completed and all your dietary requirements for your individual insurance company have been completed you will be submitted to insurance.  Please make sure to check with us to see if we have received your psychological evaluation. Please be aware that any co-pays or deductibles may be requested prior to testing and / or procedures. You will need to schedule a psychological evaluation for weight loss surgery. Patients will be required to complete all psychological testing as required by the psychologist. Patients must follow all of the psychologist's recommendations before weight loss surgery can be scheduled. The evaluation must be done a standard way for weight loss surgery. We strongly recommend that you contact one of our preferred providers listed below to arrange this:    Bernice Wilson, [address] [facility]                                                              [address] [place] [address]                                                                                      [phone]     [facility] Shekhar Flowers [address] [place] [address]                                                                                                [phone]        Dr. Akshat Coy, PhD                         Genevieve Lipoma. [place], [place]                                                                                              [phone]     You will also need to plan on attending a 2 hour nutrition class at the [facility] prior to your surgery. We will schedule this for you when we schedule your surgery. Please remember to have your labs drawn prior to your scheduled appointment to review the results of your testing. Please remember, that while we will submit your case to insurance for surgery authorization, it is your responsibility to know if your plan covers weight loss surgery and keep up-to-date with changes to your insurance coverage.   We will do everything possible to help you get approved for weight loss surgery, but cannot guarantee an approval. Please note that you will not be submitted to your insurance company until all   pre-operative testing requirements are met. · Please remember you need to schedule to attend one Support Group meeting held at the [facility] before surgery. This one meeting is mandatory. You can attend as many support group meetings before and after surgery. Support group meetings are held at the [facility] from 6:00 - 8:00pm 1st, and 3rd Tuesday of every month. See dates listed below. · Each individual person has his / her own medical requirements that need fulfilled before being able to schedule bariatric surgery . Please finalize these requirements by contacting our Bariatric Nurse at [phone].    6/28/22 Pre-Op Labs  Calcium 8.3L, vitamin D 14 L, Hgb 12.4 WNL, Hct 38.0 WNL, Ferritin 225 WNL  Note:  Pt had low vit D. Per Dr Pinky Latif, pt instructed to see her PCP and treat vit D def per her PCP. No recheck of vit D required prior to surgery per Dr Pinky Latif. High Vitamin D Foods:  Written By: Betsy Pelayo RD/KASH    What role does Vitamin D play in the body? Vitamin D is known as the sunshine vitamin. Vitamin D is actually a hormone that is produced in our bodies by ultraviolet B rays. These light rays trigger the production of vitamin D by our skin cells. The hormone that is produced is called calcitriol and once it is made it travels to the intestines to help with the absorption of dietary calcium, as well as fluoride. How does Vitamin D work? Vitamin D and Calcium work together to promote the growth of bones and development of healthy teeth. The amount of vitamin D you produce effects the amount of calcium that can be absorbed by the body. When calcium from foods you eat reaches the intestines, it waits for the presence of the vitamin D hormone, calcitriol, to be able to cross the intestinal membranes and to be transported to your bones.   Without sufficient vitamin D, calcium levels in the bones and teeth will decrease leading to weak, brittle bones and increasing the possibility of osteoporosis. WHY? Calcium is primarily found in the blood and needs to remain balanced at a constant level. The calcium that is in the blood is there to be used for quick transport to muscles and nerves when required. When calcium is sent to required organs, the calcium levels in the blood become low. Calcium is then pulled out of the bones and goes into the blood to replace what was sent to the muscles or nerves. This is the normal cycle and is perfect if you have a continuous supply of calcium from the diet to replace calcium from bones and teeth to keep blood levels steady. If dietary calcium levels are depleted, the blood will keep pulling calcium away from the bones and teeth and the structures will be weak. Where can Vitamin D be found? Vitamin D can be self-synthesized with sunlight. Vitamin D is also found in fortified milk, fortified margarine, egg yolks, liver and fatty fish. What occurs when you are deficient in Vitamin D? Bones and Teeth:  Bronson Griffins can occur with Vitamin D deficiency, which is known as softening of the bones and teeth. Due to the softening effect we can see deformities of the limbs, spine, thorax and pelvis. Also seen is pain in the pelvis, lower back and legs, including increase risk to bone fractures. Blood:  Vitamin D deficiency can cause decreased calcium and/or phosphorus in the blood and increased alkaline phosphatase. Nervous/Muscular Systems:  Vitamin D deficiency can cause lax muscles resulting in protrusion of the abdomen (Pot Belly) and muscle spasms. Some patients complain of involuntary twitching and  muscle spasms. Excretory System:  Vitamin D deficiency can cause increased calcium in the stool and decreased calcium in the urine.     Other:  Vitamin D deficiency can cause abnormally high secretions of parathormone, which is why we run lab work at 1 year to make sure the calcium supplements you are taking are being absorbed correctly. Vitamin D in Commonly Eaten Foods Ranked Richest to [employer]:      Food Serving Size IU of   Vitamin D----------        Herring, fresh, raw,  1 oz. 255 IU   [place] 1 oz. 142 IU   Milk,Cows Fortified 1 Cup 100 IU   Sardines, canned 1 oz. 85 IU   Chicken-Liver, Cooked 3oz. 45 IU   Shrimp, Canned 1oz. 30 IU   Egg Yolk 1 25 IU   Calf-Liver, Cooked 3oz. 12 IU   Cream, Light 1 T 8 IU   Cheddar Cheese 1 oz. 3 IU   Oysters 4 3 IU   Butter  1 tsp 1.4 IU            Unfortunately after RYGB surgery you will not be able to increase your Vitamin D with diet alone. A Vitamin D supplement will be needed in order to improve Vitamin D lab values.

## 2022-06-30 LAB — ZINC: 66 UG/DL (ref 60–120)

## 2022-07-02 LAB — VITAMIN B1 WHOLE BLOOD: 127 NMOL/L (ref 70–180)

## 2022-07-06 NOTE — PROGRESS NOTES
[address]                                                                                                                    PRE OP INSTRUCTIONS FOR  Demarcus Bassett        Date: 7/6/2022    Date of surgery:  7/7/2022    Arrival Time: Hospital will call you between 5pm and 7pm with your final arrival time for surgery    1. Nothing by mouth (NPO) as instructed. __midnight __________________________________________________________________    2. Take the following medications with a small sip of water on the morning of Surgery:      3. Diabetics may take evening dose of insulin but none after midnight. If you feel symptomatic or low blood sugar morning of surgery drink 1-2 ounces of apple juice only. 4. Aspirin, Ibuprofen, Advil, Naproxen, Vitamin E and other Anti-inflammatory products should be stopped  before surgery  as directed by your physician. Take Tylenol only unless instructed otherwise by your surgeon. 5. Check with your Doctor regarding stopping Plavix, Coumadin, Lovenox, Eliquis, Effient, or other blood thinners. 6. Do not smoke,use illicit drugs and do not drink any alcoholic beverages 24 hours prior to surgery. 7. You may brush your teeth the morning of surgery. DO NOT SWALLOW WATER    8. You MUST make arrangements for a responsible adult to take you home after your surgery. You will not be allowed to leave alone or drive yourself home. It is strongly suggested someone stay with you the first 24 hrs. Your surgery will be cancelled if you do not have a ride home. 9. PEDIATRIC PATIENTS ONLY:  A parent/legal guardian must accompany a child scheduled for surgery and plan to stay at the hospital until the child is discharged. Please do not bring other children with you.     10. Please wear simple, loose fitting clothing to the hospital.  Amirah Anne-Marie not bring valuables (money, credit cards, checkbooks, etc.) Do not wear any makeup (including no eye makeup) or nail polish on

## 2022-07-07 ENCOUNTER — ANESTHESIA (OUTPATIENT)
Dept: ENDOSCOPY | Age: 47
End: 2022-07-07
Payer: MEDICAID

## 2022-07-07 ENCOUNTER — HOSPITAL ENCOUNTER (OUTPATIENT)
Dept: ULTRASOUND IMAGING | Age: 47
Discharge: HOME | End: 2022-07-07
Payer: MEDICAID

## 2022-07-07 ENCOUNTER — ANESTHESIA EVENT (OUTPATIENT)
Dept: ENDOSCOPY | Age: 47
End: 2022-07-07
Payer: MEDICAID

## 2022-07-07 ENCOUNTER — HOSPITAL ENCOUNTER (OUTPATIENT)
Age: 47
Setting detail: OUTPATIENT SURGERY
Discharge: HOME | End: 2022-07-07
Attending: SURGERY | Admitting: SURGERY
Payer: MEDICAID

## 2022-07-07 VITALS
WEIGHT: 293 LBS | SYSTOLIC BLOOD PRESSURE: 167 MMHG | HEIGHT: 65 IN | RESPIRATION RATE: 16 BRPM | OXYGEN SATURATION: 99 % | DIASTOLIC BLOOD PRESSURE: 87 MMHG | TEMPERATURE: 97.9 F | HEART RATE: 69 BPM | BODY MASS INDEX: 48.82 KG/M2

## 2022-07-07 DIAGNOSIS — K30 INDIGESTION: ICD-10-CM

## 2022-07-07 DIAGNOSIS — K21.9 GASTROESOPHAGEAL REFLUX DISEASE, UNSPECIFIED WHETHER ESOPHAGITIS PRESENT: ICD-10-CM

## 2022-07-07 PROCEDURE — 3700000000 HC ANESTHESIA ATTENDED CARE: Performed by: SURGERY

## 2022-07-07 PROCEDURE — 88305 TISSUE EXAM BY PATHOLOGIST: CPT

## 2022-07-07 PROCEDURE — 7100000011 HC PHASE II RECOVERY - ADDTL 15 MIN: Performed by: SURGERY

## 2022-07-07 PROCEDURE — 2709999900 HC NON-CHARGEABLE SUPPLY: Performed by: SURGERY

## 2022-07-07 PROCEDURE — 2580000003 HC RX 258: Performed by: SURGERY

## 2022-07-07 PROCEDURE — 43239 EGD BIOPSY SINGLE/MULTIPLE: CPT | Performed by: SURGERY

## 2022-07-07 PROCEDURE — 3609012400 HC EGD TRANSORAL BIOPSY SINGLE/MULTIPLE: Performed by: SURGERY

## 2022-07-07 PROCEDURE — 6360000002 HC RX W HCPCS: Performed by: NURSE ANESTHETIST, CERTIFIED REGISTERED

## 2022-07-07 PROCEDURE — 2580000003 HC RX 258: Performed by: NURSE ANESTHETIST, CERTIFIED REGISTERED

## 2022-07-07 PROCEDURE — 7100000010 HC PHASE II RECOVERY - FIRST 15 MIN: Performed by: SURGERY

## 2022-07-07 PROCEDURE — 88342 IMHCHEM/IMCYTCHM 1ST ANTB: CPT

## 2022-07-07 PROCEDURE — 76705 ECHO EXAM OF ABDOMEN: CPT

## 2022-07-07 PROCEDURE — 3700000001 HC ADD 15 MINUTES (ANESTHESIA): Performed by: SURGERY

## 2022-07-07 RX ORDER — PROPOFOL 10 MG/ML
INJECTION, EMULSION INTRAVENOUS PRN
Status: DISCONTINUED | OUTPATIENT
Start: 2022-07-07 | End: 2022-07-07 | Stop reason: SDUPTHER

## 2022-07-07 RX ORDER — SODIUM CHLORIDE 0.9 % (FLUSH) 0.9 %
5-40 SYRINGE (ML) INJECTION PRN
Status: DISCONTINUED | OUTPATIENT
Start: 2022-07-07 | End: 2022-07-07 | Stop reason: HOSPADM

## 2022-07-07 RX ORDER — SODIUM CHLORIDE 9 MG/ML
INJECTION, SOLUTION INTRAVENOUS CONTINUOUS PRN
Status: DISCONTINUED | OUTPATIENT
Start: 2022-07-07 | End: 2022-07-07 | Stop reason: SDUPTHER

## 2022-07-07 RX ORDER — SODIUM CHLORIDE 0.9 % (FLUSH) 0.9 %
5-40 SYRINGE (ML) INJECTION EVERY 12 HOURS SCHEDULED
Status: DISCONTINUED | OUTPATIENT
Start: 2022-07-07 | End: 2022-07-07 | Stop reason: HOSPADM

## 2022-07-07 RX ORDER — SODIUM CHLORIDE 9 MG/ML
INJECTION, SOLUTION INTRAVENOUS CONTINUOUS
Status: DISCONTINUED | OUTPATIENT
Start: 2022-07-07 | End: 2022-07-07 | Stop reason: HOSPADM

## 2022-07-07 RX ORDER — SODIUM CHLORIDE 9 MG/ML
INJECTION, SOLUTION INTRAVENOUS PRN
Status: DISCONTINUED | OUTPATIENT
Start: 2022-07-07 | End: 2022-07-07 | Stop reason: HOSPADM

## 2022-07-07 RX ADMIN — SODIUM CHLORIDE: 9 INJECTION, SOLUTION INTRAVENOUS at 13:32

## 2022-07-07 RX ADMIN — PROPOFOL 180 MG: 10 INJECTION, EMULSION INTRAVENOUS at 13:38

## 2022-07-07 RX ADMIN — SODIUM CHLORIDE: 9 INJECTION, SOLUTION INTRAVENOUS at 12:09

## 2022-07-07 ASSESSMENT — PAIN - FUNCTIONAL ASSESSMENT: PAIN_FUNCTIONAL_ASSESSMENT: NONE - DENIES PAIN

## 2022-07-07 NOTE — H&P
Fabiola Pineda  7/7/2022  [facility]    Initial Evaluation  Bariatric Surgery and EGD       Subjective:  CHIEF COMPLAINT: Morbid obesity, malnutrition, GERD, Degenerative Joint Disease (DJD) and Extremity Edema    HISTORY OF PRESENT ILLNESS: Fabiola Pineda is a morbidly-obese 49 y.o.  female, who weighs 299 lb (135.6 kg). She is 144 pounds over her ideal body weight. The severity is severe with Body mass index is 49.92 kg/m². She has multiple medical problems aggravated by her obesity. They have been overweight since age 24. She wishes to have bariatric surgery so that she can lose a large amount of weight to a goal of their ideal body weight based on height, build and sex, and keep the weight off. I have met with [facility] in the [facility] where we discussed the surgery in great detail and went over the risks and benefits. She has watched our informational video so she understands all of the extensive risks involved. She states that she understands all of the risks and wishes to proceed with the evaluation. We have discussed the different surgical options in detail with use of diagrams and drawings to detail the procedures, risks and alternatives. We discussed the postoperative diet and proposed life long diet for weight management after surgery.      They are a nonsmoker  They have no significant exposure to second hand smoke    Hx of MESH PLACEMENT AND REVISIONAL INCISIONAL HERNIA    Past Medical History  Patient Active Problem List   Diagnosis    Teratoma of right ovary    Hydrosalpinx    Menorrhagia    Class 3 severe obesity due to excess calories without serious comorbidity with body mass index (BMI) of 45.0 to 49.9 in adult 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 Umesh Mendes N/A 1/21/2022    RIGHT THYROID LOBECTOMY performed by Miya Foss MD at [facility] OR      Allergies: Vicodin [hydrocodone-acetaminophen]     Home Medications  No current facility-administered medications for this encounter. Social History:   TOBACCO:   reports that she has quit smoking. She has never used smokeless tobacco.  All smokers must join the free smoking cessation program and stop smoking for 3 months before having any Bariatric surgery. ETOH:    reports previous alcohol use. The patient has a family history that is negative for severe cardiovascular or respiratory issues, negative for reaction to anesthesia. Review of Systems    A complete 10 system review was performed and are otherwise negative unless mentioned in the above HPI. Specific negatives are listed below but may not include all those reviewed.     General ROS: negative obtundation, AMS  ENT ROS: negative rhinorrhea, epistaxis  Allergy and Immunology ROS: negative itchy/watery eyes or nasal congestion  Hematological and Lymphatic ROS: negative spontaneous bleeding or bruising  Endocrine ROS: negative  lethargy, mood swings, palpitations or polydipsia/polyuria  Respiratory ROS: negative sputum changes, stridor, tachypnea or wheezing  Cardiovascular ROS: negative for - loss of consciousness, murmur or orthopnea  Gastrointestinal ROS: negative for - hematochezia or hematemesis  Genito-Urinary ROS: negative for -  genital discharge or hematuria  Musculoskeletal ROS: negative for - focal weakness, gangrene  Psych/Neuro ROS: negative for - visual or auditory hallucinations, suicidal ideation      Physical Exam:   VITALS: Ht 5' 5\" (1.651 m)   Wt 300 lb (136.1 kg)   LMP 04/06/2015   BMI 49.92 kg/m²   General appearance: AAO, NAD  Eyes: PERRL, EOMI, red conjunctiva  Lungs: equal chest rise bilateral, no wheeze, no rhonchi  Chest wall: atraumatic, no tenderness, no echymosis or abrasions  Heart: reg rate, no murmur  Abdomen: soft, nondistended, nontender, obese  Extremities: full ROM all 4 ext, no gross motor or sensory deficits  Pulses: 2+ distal  Skin: warm and dry  Neurologic: spontanous eye opening, purposeful, follows complex commands      Assessment:  Morbid obesity with inability to keep the weight off with diet and exercise. She is interested in Laparoscopic Chantel-en- Y Gastric Bypass or Sleeve Gastrectomy. We discussed that our goal is to ameliorate the medical problems and not to obtain a specific body mass index. She understands the risks and benefits, and wishes to proceed with the evaluation. Plan:  Psych evaluation, medical clearance, gallbladder ultrasound. These patients often have vitamin deficiencies so I will do screening labs for malnutrition. I will do an upper endoscopy to check for hiatal hernias, ulcers and H. Pylori while we wait for insurance approval for the surgery. I have spent over 45min in evaluation of the patient including greater than 50% of that time face to face discussing surgical options, medical and surgical history, plans for medical weight loss management and preoperative clearance for surgery. They did not have family present for the discussion and visual aides and drawings were used to explain anatomy and surgical procedures.      Physician Signature: Electronically signed by Dr. Renny Mitchell MD    Send copy of H&P to PCP, Chaitanya Huitron DO

## 2022-07-07 NOTE — OP NOTE
[address] [place] [address] [facility]           Operative Report    DATE OF PROCEDURE: 7/7/2022    [facility]    SURGEON: Bebo Sanchez MD.     ASSISTANT: None    PREOPERATIVE DIAGNOSES:  GERD    POSTOPERATIVE DIAGNOSES:   (1) No Hiatal Hernia, Effacement Hill Grade I  (2) No Esophagitis  (3) No Gastritis  (4) No Duodenitis    OPERATION: [id number] with antral biopsies    ANESTHESIA: LMAC    EBL: None    COMPLICATIONS: None. History and consent: This is a 49y.o. year old female who is having GERD with plans to pursue surgical weight loss. I have discussed with the patient the indication, alternatives, and the possible risks and/or complications of upper endoscopy and the conscious sedation anesthesia. The patient and/or family understands and agrees to proceed. Monitoring and Safety:    The patient was placed on a cardiac monitor and vital signs, pulse oximetry and level of consciousness were continuously evaluated throughout the procedure. The patient was closely monitored until recovery from the medications was complete and the patient had returned to baseline status. Anesthesia was present at all times during the procedure. OPERATIONS: The patient was placed on the table and sedated while blood pressure, pulse and pulse oximetry were continuously monitored by the anesthesia team. A bite block was placed prior to sedation and the patient was placed in the left lateral decubitus position. A lubricated scope was easily passed into the upper esophagus which looked normal. The distal esophagus looked normal. The scope was passed into the stomach and retroflexed. Hill Grade I. The gastroesophageal junction was at 41cm. There was no hiatal hernia. The scope was passed down toward the pylorus. The antral mucosa looked normal. Biopsy was taken to check for H. pylori.  The scope was then passed through the pylorus into the duodenal bulb which looked normal, then around to the distal duodenum which looked normal, and the scope was then withdrawn. The patient tolerated the procedure well.        Diet: Low fat, low calorie, high protein    PLAN:  (1) Follow up in office to discuss pathology, imaging, labs      Further Procedures:  Surgical weight loss pending approval    Physician Signature: Electronically signed by Dr. Kenya Coombs

## 2022-07-07 NOTE — ANESTHESIA PRE PROCEDURE
Department of Anesthesiology  Preprocedure Note       Name:  Moreno Rosales   Age:  49 y.o.  :  1975                                          MRN:  [id number]         Date:  2022      Surgeon: Ena Gottlieb):  Tiffany Rosenberg MD    Procedure: Procedure(s):  EGD ESOPHAGOGASTRODUODENOSCOPY    Medications prior to admission:   Prior to Admission medications    Medication Sig Start Date End Date Taking? Authorizing Provider   FIBER ADULT GUMMIES PO Take 1 tablet by mouth daily   Yes Historical Provider, MD   buPROPion (WELLBUTRIN XL) 150 MG extended release tablet Take 1 tablet by mouth every morning 22   Shea Knapp MD   Multiple Vitamin (MVI, CELEBRATE, CHEWABLE TABLET) Take 1 tablet by mouth daily 22   Shea Knapp MD       Current medications:    Current Facility-Administered Medications   Medication Dose Route Frequency Provider Last Rate Last Admin    0.9 % sodium chloride infusion   IntraVENous Continuous Tiffany Rosenberg  mL/hr at 22 1209 New Bag at 22 1209    0.9 % sodium chloride infusion   IntraVENous PRN Tiffany Rosenberg MD        sodium chloride flush 0.9 % injection 5-40 mL  5-40 mL IntraVENous 2 times per day Tiffany Rosenberg MD        sodium chloride flush 0.9 % injection 5-40 mL  5-40 mL IntraVENous PRN Tiffany Rosenberg MD           Allergies:     Allergies   Allergen Reactions    Vicodin [Hydrocodone-Acetaminophen] Hives       Problem List:    Patient Active Problem List   Diagnosis Code    Teratoma of right ovary D27.0    Hydrosalpinx N70.11    Menorrhagia N92.0    Class 3 severe obesity due to excess calories without serious comorbidity with body mass index (BMI) of 45.0 to 49.9 in adult ([facility] [address] E66.01, Y26.23    Umbilical hernia B30.5    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 Angela Omalleyh N/A 1/21/2022    RIGHT THYROID LOBECTOMY performed by Shankar Patino MD at [address] [place] [address] History:    Social History     Tobacco Use    Smoking status: Former Smoker    Smokeless tobacco: Never Used    Tobacco comment: quit 1 week ago   Substance Use Topics    Alcohol use: Not Currently                                Counseling given: Not Answered  Comment: quit 1 week ago      Vital Signs (Current):   Vitals:    07/06/22 1357 07/07/22 1145 07/07/22 1251   BP:  (!) 180/96 (!) 159/91   Pulse:  72    Resp:  18    Temp:  97.7 °F (36.5 °C)    TempSrc:  Temporal    SpO2:  95%    Weight: 300 lb (136.1 kg) (!) 305 lb (138.3 kg)    Height: 5' 5\" (1.651 m) 5' 5\" (1.651 m)                                               BP Readings from Last 3 Encounters:   07/07/22 (!) 159/91   06/28/22 139/79   06/10/22 (!) 171/112       NPO Status: Time of last liquid consumption: 0001                        Time of last solid consumption: 2100                        Date of last liquid consumption: 07/07/22                        Date of last solid food consumption: 07/06/22    BMI:   Wt Readings from Last 3 Encounters:   07/07/22 (!) 305 lb (138.3 kg)   06/28/22 (!) 303 lb 6.4 oz (137.6 kg)   06/10/22 299 lb (135.6 kg)     Body mass index is 50.75 kg/m².     CBC:   Lab Results   Component Value Date/Time    WBC 8.6 06/28/2022 09:25 AM    RBC 4.21 06/28/2022 09:25 AM    HGB 12.4 06/28/2022 09:25 AM    HCT 38.0 06/28/2022 09:25 AM    MCV 90.3 06/28/2022 09:25 AM    RDW 14.6 06/28/2022 09:25 AM    PLT 218 06/28/2022 09:25 AM       CMP:   Lab Results   Component Value Date/Time     06/28/2022 09:25 AM    K 3.9 06/28/2022 09:25 AM    K 3.9 05/08/2019 06:37 PM     06/28/2022 09:25 AM    CO2 21 06/28/2022 09:25 AM    BUN 14 06/28/2022 09:25 AM    CREATININE 0.8 06/28/2022 09:25 AM    GFRAA >60 06/28/2022 09:25 AM    LABGLOM >60 06/28/2022 09:25 AM    GLUCOSE 88 06/28/2022 09:25 AM    PROT 7.2 06/28/2022 09:25 AM    CALCIUM 8.3 06/28/2022 09:25 AM    BILITOT 0.4 06/28/2022 09:25 AM    ALKPHOS 64 06/28/2022 09:25 AM    AST 15 06/28/2022 09:25 AM    ALT 11 06/28/2022 09:25 AM       POC Tests: No results for input(s): POCGLU, POCNA, POCK, POCCL, POCBUN, POCHEMO, POCHCT in the last 72 hours. Coags: No results found for: PROTIME, INR, APTT    HCG (If Applicable):   Lab Results   Component Value Date    PREGTESTUR negative 05/06/2015        ABGs: No results found for: PHART, PO2ART, ZNN3VHZ, RIX5WBJ, BEART, Z5WHSYIA     Type & Screen (If Applicable):  No results found for: LABABO, LABRH    Drug/Infectious Status (If Applicable):  No results found for: HIV, HEPCAB    COVID-19 Screening (If Applicable):   Lab Results   Component Value Date/Time    COVID19 Not Detected 01/18/2022 11:52 AM           Anesthesia Evaluation  Patient summary reviewed and Nursing notes reviewed no history of anesthetic complications:   Airway: Mallampati: III  TM distance: >3 FB   Neck ROM: full  Mouth opening: > = 3 FB   Dental: normal exam         Pulmonary:Negative Pulmonary ROS and normal exam  breath sounds clear to auscultation                             Cardiovascular:  Exercise tolerance: good (>4 METS),           Rhythm: regular  Rate: normal                    Neuro/Psych:   Negative Neuro/Psych ROS              GI/Hepatic/Renal:   (+) morbid obesity          Endo/Other:    (+) malignancy/cancer.     (-) blood dyscrasia, no electrolyte abnormalities               Abdominal:             Vascular: negative vascular ROS.         Other Findings:             Anesthesia Plan      MAC     ASA 3       Induction: intravenous. Anesthetic plan and risks discussed with patient. Plan discussed with CRNA. DOS STAFF ADDENDUM:    Pt seen and examined, chart reviewed (including anesthesia, drug and allergy history). Anesthetic plan, risks, benefits, alternatives, and personnel involved discussed with patient. Patient verbalized an understanding and agrees to proceed. Plan discussed with care team members and agreed upon.     Candace Otero MD  Staff Anesthesiologist  1:22 PM    Candace Otero MD   7/7/2022

## 2022-07-15 ENCOUNTER — OFFICE VISIT (OUTPATIENT)
Dept: BARIATRICS/WEIGHT MGMT | Age: 47
End: 2022-07-15
Payer: MEDICAID

## 2022-07-15 VITALS
HEIGHT: 65 IN | RESPIRATION RATE: 20 BRPM | BODY MASS INDEX: 48.82 KG/M2 | SYSTOLIC BLOOD PRESSURE: 171 MMHG | TEMPERATURE: 97.3 F | HEART RATE: 78 BPM | WEIGHT: 293 LBS | DIASTOLIC BLOOD PRESSURE: 103 MMHG

## 2022-07-15 DIAGNOSIS — E66.01 MORBID OBESITY DUE TO EXCESS CALORIES (HCC): Primary | ICD-10-CM

## 2022-07-15 PROCEDURE — G8417 CALC BMI ABV UP PARAM F/U: HCPCS | Performed by: SURGERY

## 2022-07-15 PROCEDURE — G8427 DOCREV CUR MEDS BY ELIG CLIN: HCPCS | Performed by: SURGERY

## 2022-07-15 PROCEDURE — 99211 OFF/OP EST MAY X REQ PHY/QHP: CPT

## 2022-07-15 PROCEDURE — 99212 OFFICE O/P EST SF 10 MIN: CPT | Performed by: SURGERY

## 2022-07-15 PROCEDURE — 1036F TOBACCO NON-USER: CPT | Performed by: SURGERY

## 2022-07-15 NOTE — PROGRESS NOTES
Diagnosis:   Gastric antrum, endoscopic biopsy: Mild chronic gastritis (see comment). Comment:    There is no mucosal atrophy, or intestinal metaplasia. An H.   pylori immunostain is negative for Helicobacter organisms.      Denies JOEV

## 2022-07-15 NOTE — PROGRESS NOTES
Gean Hammans  7/15/2022  [facility] 621 Follow-up. Subjective:   Gean Hammans is a 49 y.o. female post EGD done 2 weeks ago. She did not have a hiatal hernia, did not have gastritis. Biopsy did not show Helicobacter pylori. The right upper quadrant ultrasound showed no stones and confirmed hepatic steatosis. Denies any change in medical history since our last visit. They had a good experience at [facility] during their visit for preoperative testing. We discussed again the surgical options. They are interested in pursuing RNYGB. Weight: 300 lb (136.1 kg). Labs reviewed: Vit D deficiency    A complete 10 system review was performed and are otherwise negative unless mentioned in the above HPI. Specific negatives are listed below but may not include all those reviewed.     General ROS: negative obtundation, AMS  ENT ROS: negative rhinorrhea, epistaxis  Allergy and Immunology ROS: negative itchy/watery eyes or nasal congestion  Hematological and Lymphatic ROS: negative spontaneous bleeding or bruising  Endocrine ROS: negative  lethargy, mood swings, palpitations or polydipsia/polyuria  Respiratory ROS: negative sputum changes, stridor, tachypnea or wheezing  Cardiovascular ROS: negative for - loss of consciousness, murmur or orthopnea  Gastrointestinal ROS: negative for - hematochezia or hematemesis  Genito-Urinary ROS: negative for -  genital discharge or hematuria  Musculoskeletal ROS: negative for - focal weakness, gangrene  Psych/Neuro ROS: negative for - visual or auditory hallucinations, suicidal ideation    Physical exam:    BP (!) 171/103 (Site: Right Lower Arm, Position: Sitting, Cuff Size: Medium Adult)   Pulse 78   Temp 97.3 °F (36.3 °C) (Temporal)   Resp 20   Ht 5' 5\" (1.651 m)   Wt 300 lb (136.1 kg)   LMP 04/06/2015   BMI 49.92 kg/m²   General appearance: AAO, NAD  Eyes: PERRL, EOMI, red conjunctiva  Lungs: Equal chest rise bilateral  Chest wall: atraumatic, no tenderness, no echymosis or abrasions  Heart: reg rate, no murmur  Abdomen: soft, nondistended, obese, nontender  Extremities: full ROM all 4 ext, no gross motor or sensory deficits  Pulses: 2+ distal  Skin: warm and dry  Neurologic: spontanous eye opening, purposeful, follows complex commands  Psych: No hallucinations      Assessment:    Robin Rousseau is a 49 y.o. female who is being evaluated for weight loss surgery. Currently undergoing medical weight loss management. Recently completed labs, RUQ US and Endoscopy with findings of fatty liver. Vitamin deficiency. Plan:   Stay on the high protein, low calorie diet while waiting for insurance approval. Walk as much as possible but keep your legs elevated when sitting. Continue deep breathing exercizes. Aim for 60 gm Protein and 90 oz of liquids per day. Track protein and fluid intake. Make sure the bowel move daily by taking fiber such as Metamucil. We discussed results and surgery for over 15 minutes. Cont medical weight loss counseling and pursue medical, cardiac clearance as well as Psychological evaluation. Plans to pursue Laparoscopic oux-en-Y gastric bypass .     Vit D supplementation for 3 months OTC    Referral for Cardiac clearance necessary based on medical history and exam      Physician Signature: Electronically signed by Dr. Ricky Fonseca MD   Cc:  Michelle Baer DO

## 2022-07-18 ENCOUNTER — TELEPHONE (OUTPATIENT)
Dept: BARIATRICS/WEIGHT MGMT | Age: 47
End: 2022-07-18

## 2022-07-18 ENCOUNTER — TELEPHONE (OUTPATIENT)
Dept: ADMINISTRATIVE | Age: 47
End: 2022-07-18

## 2022-07-18 NOTE — TELEPHONE ENCOUNTER
Drew Moran called pt and LM about rescheduling his appointment. Drew Moran is OOT on 7/28/22. Drew Moran asked pt to call the [facility] to reschedule.

## 2022-07-18 NOTE — TELEPHONE ENCOUNTER
Patient Appointment Form:      PCP:Latanya  Referring: Micheal Dowling    Has the Patient:    Seen a Cardiologist? no    Had a heart catheterization? no    Had heart surgery? no    Had a stress test or nuclear stress test? no    Had an echocardiogram? no    Had a vascular ultrasound? no    Had a 24/48 heart monitor or extended cardiac event monitor? no    Had recent blood work in the last 6 months? yes    date: 07/2022    ordering physician: Alpesh Cross    Had a pacemaker/ICD/ILR implant? no    Seen an Electrophysiologist? no        Will send records via: no cardiac records      Date & time of appointment:  09/412 8:15 am

## 2022-07-29 ENCOUNTER — INITIAL CONSULT (OUTPATIENT)
Dept: BARIATRICS/WEIGHT MGMT | Age: 47
End: 2022-07-29
Payer: MEDICAID

## 2022-07-29 DIAGNOSIS — Z71.89 ENCOUNTER FOR PSYCHOLOGICAL ASSESSMENT PRIOR TO BARIATRIC SURGERY: Primary | ICD-10-CM

## 2022-07-29 DIAGNOSIS — F50.9 COMPULSIVE EATING PATTERNS: ICD-10-CM

## 2022-07-29 DIAGNOSIS — F50.81: ICD-10-CM

## 2022-07-29 PROCEDURE — 90791 PSYCH DIAGNOSTIC EVALUATION: CPT | Performed by: SOCIAL WORKER

## 2022-07-29 PROCEDURE — 1036F TOBACCO NON-USER: CPT | Performed by: SOCIAL WORKER

## 2022-07-29 NOTE — PATIENT INSTRUCTIONS
Assignments/Recommendations: 1-2 follow-up sessions with SW for further education/evaluation. Complete entries in \"Why We Eat\", \"Reality Journal\" and \"Identifying and Handling Cravings\" to discuss next session. Attend [facility] support group. Follow-up with: referrals/present providers/all scheduled appointments at [facility].   Handouts provided
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
## 2022-08-03 ENCOUNTER — TELEPHONE (OUTPATIENT)
Dept: BARIATRICS/WEIGHT MGMT | Age: 47
End: 2022-08-03

## 2022-08-03 ENCOUNTER — INITIAL CONSULT (OUTPATIENT)
Dept: BARIATRICS/WEIGHT MGMT | Age: 47
End: 2022-08-03

## 2022-08-03 VITALS — BODY MASS INDEX: 48.82 KG/M2 | HEIGHT: 65 IN | WEIGHT: 293 LBS

## 2022-08-03 DIAGNOSIS — Z00.8 NUTRITIONAL ASSESSMENT: Primary | ICD-10-CM

## 2022-08-03 DIAGNOSIS — Z71.3 NUTRITIONAL COUNSELING: ICD-10-CM

## 2022-08-03 PROCEDURE — 99999 PR OFFICE/OUTPT VISIT,PROCEDURE ONLY: CPT | Performed by: DIETITIAN, REGISTERED

## 2022-08-03 NOTE — PROGRESS NOTES
Weight Loss Assessment: Completed by Nutrition Services RD/LD Certified in Adult Weight Management:  Phone Number:  [phone]  Fax Number:   [phone]    Moreno Rosales   8/3/22  Weight Loss Appointment: 2nd Weight Loss Appointment      Wt Readings from Last 3 Encounters:   08/03/22 300 lb (136.1 kg)   07/15/22 300 lb (136.1 kg)   07/07/22 (!) 305 lb (138.3 kg)        300 lb (136.1 kg)  IBW: 155 lbs         % IBW: 194%       % EBWL: 0%           ABW: 191 lbs  % ABW: 157%       BMI: Body mass index is 49.92 kg/m². Patient's 24 Hour Recall:  Breakfast: None  Snack: None  Lunch: [place] Chicken and Rice  Snack: None  Dinner: Grilled Pork Chop , Broccoli and Cheese  Snack: None  Water Intake: 4.5 - 24 oz Bottles  Other Beverages: None  Exercise: ADL's - None    Education:   1. Weigh yourself daily and record it. 2. Keep documented food records daily   3. 220-225 minutes a week of moderate physical activity   4. Just be more active in day to day routine   5. Higher protein intake and a higher fiber intake. Not a high protein or a high fiber diet just a higher intake. Drew Moran addressed the following with the pt:  - Drew Moran enc pt to comply with nutrition recommendations  - Drew Moran enc to go back to maintenance of regular physical activity  - Periodic assessment to prevent and treat eating or other psychiatric disorders   - Drew / Dean enc participation in support group meetings  - Drew Moran enc pt to go back to maintenance of daily food records and weight monitoring records   - Drew Moran reviewed the importance of adequate sleep and stress management  - Drew Moran reviewed nonfood strategies to cope with emotions and stress  - Drew Moran encouraged pt to practice the following: Mindful eating: Eating slowly:  Focusing on the eating experience without distraction  - Drew Moran enc. pt to pay attention to hunger and fullness cues  - Drew Moran enc meal planning  - Drew Moran  enc pt to chose nutrient dense whole foods instead of soft, high calorie foods  - Rd / Ld enc not drinking large amounts of fluids with or immediately after meals    Portion control, meal planning and avoiding empty calorie consumption. Weight loss goal for next follow-up appointment: 5 to 10 lbs    Patient has established the following three goals for the next follow-up appointment. Pt states she wants to establish a better eating pattern. 2.Pt states she wants to increase exercise and PA. 3.Pt states she wants to cope with emotional eating in order to make a healthy lifestyle change. Patient has established the following exercise goal for next follow-up appointment:  ADL's - Walking - 5 Day's a week. Duration: 60 Minutes    Did the patient keep food records:No -  Pt was instructed by the Drew Moran that she / he needs to keep daily food records and bring the food records to all f/u appointments. Pt was instructed this is an important part of compliancy and long-term lifestyle changes and will help establish long-term weight loss and weight maintenance success after weight loss surgery. Drew Moran reviewed with the pt how to keep track of protein intake along with calories, fat and sugar content. Pt needs to be able to see that he / she is meeting his / her macro nutrient needs daily. Drew Moran reviewed different ways to keep foods records either manually or with computer apps. Pt was able to verbalize understanding. Failure to keep food records show poor compliancy following weight loss surgery. Pt has been given all the tools and education necessary to complete this task. Education spent with pt just on food records 20 minutes.          Pt. is aware if they do not comply with The [address] [facility] Guidelines that this can lead to the patient being dismissed from the program.    The registered dietitian spent the following time 60 minutes educating the patient and providing the patient with nutritional handouts to follow. __________________________________________________________________________________  Primary Care Physician Follow-up:  Pt. was seen by Galdino CORRALES regarding weight loss education and follow-up on 8/3/22. This was the patients 2nd appointment with the registered dietitian. The registered dietitian spent the following amount of time with the patient 60 minutes. Please [place] the following: The Primary Care Physician reviewed the above nutrition assessment and patient education and agrees with current diet plan. The Primary Care Physician wants the current diet plan changed to the following:_____________________________________________________________________________________________________________________________________________________________________________________________________________. Physician Signature:__________________________ Date:______________  Once signed please fax back to the [facility] [phone]. We thank you for allowing us to participate in your patients care.

## 2022-08-03 NOTE — TELEPHONE ENCOUNTER
Last Dietary Appointment Notes: 8/3/22    [address]: [address]    Surgery Requested by Patient: As of 8/3/22 - RYGB    Date: 2 Hour Nutrition Class: Once all testing is complete    Rd / Ld reviewed the following with the patient:    Rd / Ld at the [facility] reviewed with the patient that he / she has not completed the following in order to proceed with bariatric surgery:     -  Initial Appt with Surgeon was 6/10/22. Initial Appt is only good until 6/10/23. Testing will be required again after this date per your insurance company policy. Please remember just because you finished all of your requirements if you did not finish the requirements in a timely manner they can  and you can be required to complete these requirements over again. Each [employer] has its own set of requirements with its own set of deadlines. Once everything listed below is completed you will need to complete the following to proceed with sx. The [facility] will contact you to complete this process. You will be called in order to select your surgery date for insurance submission. You will be called and scheduled to attend a 3 Hour Nutrition Class on the type of surgery you are having completed. These are always scheduled on  from 10:00 am to 1:00 pm and dates vary depending on the type of surgery you are having completed. You will need to purchase your bariatric supplements at this appointment cost is $240.00 to $290.00. Failure to purchase supplements or attend the class will lead to your surgery being cancelled. You will need final Medical Clearance from your Primary Care Physician. Failure to complete will lead to your surgery being cancelled. You will be scheduled for a H&P appointment with your surgeon . It is at this appointment you will need to make goal weight. Failure to complete will lead to your surgery being cancelled.     You will be scheduled for PAT at [facility] usually the week before your scheduled surgery. Failure to complete will lead to your surgery being cancelled. Remember after all testing that is required it is your responsibility as a patient to call The [facility] to review that we received any testing results or requirements that you had completed. The [facility] is not responsible for tracking of results and testing. Your phone call will help facilitate if what is required was received and completed. - Nicotine Script Given 8/3/22 // Quite Date: End of June // Draw Date: Pt can have drawn End of Sept // Pt instructed to call 10 day's after to review results. - Pt is working with ROMÁN Irving to complete Psych Eval.    - Rd / Ld at the [facility] reviewed that he / she is not at his / her pre-surgery goal weight of 285 lbs. Patient currently weighs 300 lbs and must return to the [facility] for a weight check on H&P before the patient can be scheduled for surgery. This has been reviewed with the patient and the patient is in agreement. Rd / Ld reviewed with the patient that he / she must purchase a 3 month supply of supplements before his / her surgery or at the time of his / her H&P appointment and the patient states he / she is going to purchase the 3 month supply of supplements on H&P. Patient is aware that failure to purchase the supplements at this appointment will cancel the patients surgery date. Patient states at this time from the time of his / her initial consult here at the [facility] there has been no changes in his / her medical history. Patient is aware failure to disclose information can lead to his / her surgery being cancelled. Patient received a copy of this at the time of his / her final dietary consult.

## 2022-08-10 ENCOUNTER — TELEPHONE (OUTPATIENT)
Dept: BARIATRICS/WEIGHT MGMT | Age: 47
End: 2022-08-10

## 2022-08-12 ENCOUNTER — OFFICE VISIT (OUTPATIENT)
Dept: BARIATRICS/WEIGHT MGMT | Age: 47
End: 2022-08-12
Payer: MEDICAID

## 2022-08-12 VITALS
DIASTOLIC BLOOD PRESSURE: 74 MMHG | HEART RATE: 83 BPM | HEIGHT: 66 IN | SYSTOLIC BLOOD PRESSURE: 127 MMHG | TEMPERATURE: 97.2 F | WEIGHT: 293 LBS | BODY MASS INDEX: 47.09 KG/M2

## 2022-08-12 DIAGNOSIS — E66.01 CLASS 3 SEVERE OBESITY DUE TO EXCESS CALORIES WITH SERIOUS COMORBIDITY AND BODY MASS INDEX (BMI) OF 45.0 TO 49.9 IN ADULT (HCC): ICD-10-CM

## 2022-08-12 DIAGNOSIS — G89.29 CHRONIC PAIN OF LEFT KNEE: Primary | ICD-10-CM

## 2022-08-12 DIAGNOSIS — M25.562 CHRONIC PAIN OF LEFT KNEE: Primary | ICD-10-CM

## 2022-08-12 PROCEDURE — 99211 OFF/OP EST MAY X REQ PHY/QHP: CPT

## 2022-08-12 PROCEDURE — 99214 OFFICE O/P EST MOD 30 MIN: CPT | Performed by: INTERNAL MEDICINE

## 2022-08-12 RX ORDER — TOPIRAMATE 25 MG/1
TABLET ORAL
Qty: 60 TABLET | Refills: 1 | Status: SHIPPED
Start: 2022-08-12 | End: 2022-09-13 | Stop reason: SDUPTHER

## 2022-08-12 NOTE — PATIENT INSTRUCTIONS
Continue calorie counting daily, limit caloric intake to 1400 Tam/day. Protein: >=65 gm/day. Fiber: >=25 gm/day. Water: 64-96 oz/day. Activities 30 min/day everyday. Topiramate:  Start Topiramate 25 mg. Take one tablet twice each day for one week. If the appetite suppression is insufficient, then increase to two tablets twice daily. Follow up in 1 month.

## 2022-09-01 ENCOUNTER — INITIAL CONSULT (OUTPATIENT)
Dept: BARIATRICS/WEIGHT MGMT | Age: 47
End: 2022-09-01
Payer: MEDICAID

## 2022-09-01 DIAGNOSIS — F50.81: Primary | ICD-10-CM

## 2022-09-01 DIAGNOSIS — F50.9 COMPULSIVE EATING PATTERNS: ICD-10-CM

## 2022-09-01 PROCEDURE — 90834 PSYTX W PT 45 MINUTES: CPT | Performed by: SOCIAL WORKER

## 2022-09-01 PROCEDURE — 1036F TOBACCO NON-USER: CPT | Performed by: SOCIAL WORKER

## 2022-09-01 NOTE — PATIENT INSTRUCTIONS
Assignments/Recommendations: Continue follow-up with SW for education further evaluation. Continue with entries in hand-outs \"Reality Journal\". Attend [facility] support group. Follow up with referrals/present providers/all scheduled appointment at [facility].

## 2022-09-01 NOTE — PROGRESS NOTES
INDIVIDUAL SESSION: EVALUATION/PSYCHOEDUCATION (2nd visit)    Milena Burciaga is a 49 y.o. Single, -American  female,   Patient identify verified by Name and . Those attending session : patient      Chief Complaint   Patient presents with    Follow-up     2nd visit follow up         DX:   Encounter Diagnoses   Name Primary? Mild binge-eating disorder, in full remission Yes    Compulsive eating patterns           Wt Readings from Last 3 Encounters:   22 (!) 303 lb 6.4 oz (137.6 kg)   22 300 lb (136.1 kg)   07/15/22 300 lb (136.1 kg)         Narrative: Vanna Quick stated that she did not complete the hand outs has not been following the sample diet or eating several smaller meals per day and has not reach her pre surgery goal weight. She stated that she frequently forgets to eat until later in the day because she is not hungry. She stated that she is not sure of her emotional patterns with food and does not  usually think to much about those patterns but generally just eats when she is hungry. She also reported that she was recently given a modified diet in order to assist her in reaching her pre surgery goal weight. She stated that she last smoked on the . She also stated that she will attend the [facility] support group on 2022.        Mental Status Exam: appearance:  appropriately dressed and appropriately groomed, behavior:  normal, attitude:  cooperative, speech:  appropriate, mood:  euthymic, affect:  congruent with mood, thought content:  no evidence of psychosis, thought process:  logical and coherent, orientation:  oriented in all spheres, memory:  recent:  good and remote:  good, insight:  fair , judgment:  fair , and cognitive:  intact and intelligent    RISK ASSESSMENT    Suicide screen: denies current suicidal ideation, plan and intent    Self Injurious Behavior: denies    Homicide screen: denies current homicidal ideation, plan and intent    TREATMENT PLAN:  Goal: Increase understanding of role of emotional factors contributing to issues with food and obesity and strategies to cope. Interventions in session:  Began Reviewing \"Why We Eat\", \"Reality Journal\" and \"Identifying and Handling Cravings\" and processed pt. insights. Provided psychoeducation/reinforcement  regarding the benefits of identifying patterns of emotional eating prior to having bariatric surgery,  attending support groups and practicing meal prep/planning to support new behaviors after surgery. Registered pt for support group and explained the process. Assignments/Recommendations: Continue follow-up with SW for education further evaluation. Continue with entries in hand-outs \"Reality Journal\". Attend [facility] support group. Follow up with referrals/present providers/all scheduled appointment at [facility]. Next steps: Schedule follow up with me for  60MIN in 8 weeks and Continue with dietitian and bariatric support group    Bariatric Surgery: Based on the information gathered through the interview process - there is no current evidence of mental health or substance abuse issues that would impact on the patient receiving bariatric surgery.     Patient and/or family/guardian verbalizes understanding of and agreement with treatment recommendations and plan: yes    Start time: 9:00 am         End time: 9:45 am     Visit Time: 45MIN    GERRY Ann

## 2022-09-12 ENCOUNTER — OFFICE VISIT (OUTPATIENT)
Dept: CARDIOLOGY CLINIC | Age: 47
End: 2022-09-12
Payer: MEDICAID

## 2022-09-12 VITALS
BODY MASS INDEX: 48.82 KG/M2 | RESPIRATION RATE: 18 BRPM | WEIGHT: 293 LBS | HEIGHT: 65 IN | HEART RATE: 71 BPM | OXYGEN SATURATION: 95 % | SYSTOLIC BLOOD PRESSURE: 124 MMHG | DIASTOLIC BLOOD PRESSURE: 80 MMHG

## 2022-09-12 DIAGNOSIS — Z01.810 PREOPERATIVE CARDIOVASCULAR EXAMINATION: ICD-10-CM

## 2022-09-12 DIAGNOSIS — E66.01 MORBID OBESITY (HCC): Primary | ICD-10-CM

## 2022-09-12 PROCEDURE — 93000 ELECTROCARDIOGRAM COMPLETE: CPT | Performed by: INTERNAL MEDICINE

## 2022-09-12 PROCEDURE — G8427 DOCREV CUR MEDS BY ELIG CLIN: HCPCS | Performed by: INTERNAL MEDICINE

## 2022-09-12 PROCEDURE — G8417 CALC BMI ABV UP PARAM F/U: HCPCS | Performed by: INTERNAL MEDICINE

## 2022-09-12 PROCEDURE — 99244 OFF/OP CNSLTJ NEW/EST MOD 40: CPT | Performed by: INTERNAL MEDICINE

## 2022-09-12 NOTE — PROGRESS NOTES
Cancer Father     High Blood Pressure Sister     Asthma Sister     Heart Disease Brother     High Blood Pressure Brother     Asthma Brother        Social History:  Social History     Socioeconomic History    Marital status: Single     Spouse name: Not on file    Number of children: Not on file    Years of education: Not on file    Highest education level: Not on file   Occupational History    Not on file   Tobacco Use    Smoking status: Former    Smokeless tobacco: Never    Tobacco comments:     quit 1 week ago   Vaping Use    Vaping Use: Never used   Substance and Sexual Activity    Alcohol use: Not Currently    Drug use: No    Sexual activity: Not on file   Other Topics Concern    Not on file   Social History Narrative    Not on file     Social Determinants of Health     Financial Resource Strain: Not on file   Food Insecurity: Not on file   Transportation Needs: Not on file   Physical Activity: Not on file   Stress: Not on file   Social Connections: Not on file   Intimate Partner Violence: Not on file   Housing Stability: Not on file       Allergies: Allergies   Allergen Reactions    Vicodin [Hydrocodone-Acetaminophen] Hives       Current Medications:  Current Outpatient Medications   Medication Sig Dispense Refill    topiramate (TOPAMAX) 25 MG tablet Start Topiramate 25 mg. Take one tablet twice each day for one week. If the appetite suppression is insufficient, then increase to two tablets twice daily. 60 tablet 1    FIBER ADULT GUMMIES PO Take 1 tablet by mouth daily      Multiple Vitamin (MVI, CELEBRATE, CHEWABLE TABLET) Take 1 tablet by mouth daily 90 tablet 1    buPROPion (WELLBUTRIN XL) 150 MG extended release tablet Take 1 tablet by mouth every morning (Patient not taking: Reported on 9/12/2022) 30 tablet 3     No current facility-administered medications for this visit.          Physical Exam:  /80   Pulse 71   Resp 18   Ht 5' 5\" (1.651 m)   Wt 297 lb 9.6 oz (135 kg)   LMP 04/06/2015   SpO2 95%   BMI 49.52 kg/m²   Wt Readings from Last 3 Encounters:   09/12/22 297 lb 9.6 oz (135 kg)   08/12/22 (!) 303 lb 6.4 oz (137.6 kg)   08/03/22 300 lb (136.1 kg)     The patient is awake, alert and in no discomfort or distress. No gross musculoskeletal deformity or lymphadenopathy are present. No significant skin or nail changes are present. Gross examination of head, eyes, nose and throat are negative. Jugular venous pressure is normal and no carotid bruits are present. No thyromegaly is noted. Normal respiratory effort is noted with no accessory muscle usage present. Lung fields are clear to ascultation. Cardiac examination is notable for a regular rate and rhythm with no palpable thrill. No gallop rhythm or cardiac murmur are identified. A benign abdominal examination is present with the exception of obesity and no masses or organomegaly. A normal abdominal aortic pulsation is present. Intact pulses are present throughout all extremities and no peripheral edema is present. No focal neurologic deficits are present. Laboratory Tests:  Lab Results   Component Value Date    CREATININE 0.8 06/28/2022    BUN 14 06/28/2022     06/28/2022    K 3.9 06/28/2022     (H) 06/28/2022    CO2 21 (L) 06/28/2022     No results found for: BNP  Lab Results   Component Value Date/Time    WBC 8.6 06/28/2022 09:25 AM    RBC 4.21 06/28/2022 09:25 AM    HGB 12.4 06/28/2022 09:25 AM    HCT 38.0 06/28/2022 09:25 AM    MCV 90.3 06/28/2022 09:25 AM    MCH 29.5 06/28/2022 09:25 AM    MCHC 32.6 06/28/2022 09:25 AM    RDW 14.6 06/28/2022 09:25 AM     06/28/2022 09:25 AM    MPV 10.2 06/28/2022 09:25 AM     No results for input(s): ALKPHOS, ALT, AST, PROT, BILITOT, BILIDIR, LABALBU in the last 72 hours.   No results found for: MG  No results found for: PROTIME, INR  Lab Results   Component Value Date/Time    TSH 3.070 05/03/2022 10:45 AM     No components found for: CHLPL  Lab Results   Component Value Date    TRIG 107 06/28/2022    TRIG 118 01/21/2020     Lab Results   Component Value Date    HDL 63 01/21/2020     Lab Results   Component Value Date    LDLCALC 97 01/21/2020       Cardiac Tests:  ECG: A resting electrocardiogram demonstrates evidence of sinus rhythm with atrial enlargement, left anterior fascicular block and right bundle branch block conduction patterns      ASSESSMENT / PLAN: On a clinical basis, the patient presents with no active cardiovascular symptoms in the face of recommended bariatric surgical intervention and presently, she would be an acceptable candidate with a low risk of perioperative ischemic events. I would recommend cautious periprocedural fluid administration to reduce risk of iatrogenic volume overload and/or perioperative congestive heart failure. In the interim, she presents with several manifestations suggestive of obstructive sleep apnea and I would recommend a formal sleep assessment and initiation of nocturnal CPAP as appropriate to reduce risk of adverse cardiovascular effects. Ongoing aggressive risk factor modification of blood pressure and serum lipids will remain essential to reducing risk of future atherosclerotic development. I will presently return her to your care would happily reevaluate her in the future should additional cardiovascular difficulties or concerns arise. Follow-up office visit in as needed should additional cardiovascular difficulties or concerns arise. Thank you for allowing me to participate in your patient's care. Please feel free to contact me if you have any questions or concerns. Note: This report was completed utilizing a computerized voice recognition software. Every effort has been made to insure accuracy, however; inadvertent computerized transcription errors may be present. Mynor Babin.  Noel Archuleta, [address] [place] [address] Cardiology    An electronic copy of this consult note was forwarded to Dr. Tiffany Rosenberg

## 2022-09-13 ENCOUNTER — OFFICE VISIT (OUTPATIENT)
Dept: BARIATRICS/WEIGHT MGMT | Age: 47
End: 2022-09-13
Payer: MEDICAID

## 2022-09-13 ENCOUNTER — INITIAL CONSULT (OUTPATIENT)
Dept: BARIATRICS/WEIGHT MGMT | Age: 47
End: 2022-09-13

## 2022-09-13 VITALS
HEIGHT: 66 IN | BODY MASS INDEX: 47.09 KG/M2 | HEART RATE: 71 BPM | WEIGHT: 293 LBS | TEMPERATURE: 97.9 F | DIASTOLIC BLOOD PRESSURE: 82 MMHG | SYSTOLIC BLOOD PRESSURE: 127 MMHG

## 2022-09-13 DIAGNOSIS — Z71.3 NUTRITIONAL COUNSELING: ICD-10-CM

## 2022-09-13 DIAGNOSIS — G89.29 CHRONIC PAIN OF LEFT KNEE: Primary | ICD-10-CM

## 2022-09-13 DIAGNOSIS — E66.01 CLASS 3 SEVERE OBESITY DUE TO EXCESS CALORIES WITH SERIOUS COMORBIDITY AND BODY MASS INDEX (BMI) OF 45.0 TO 49.9 IN ADULT (HCC): ICD-10-CM

## 2022-09-13 DIAGNOSIS — M25.562 CHRONIC PAIN OF LEFT KNEE: Primary | ICD-10-CM

## 2022-09-13 DIAGNOSIS — Z00.8 NUTRITIONAL ASSESSMENT: Primary | ICD-10-CM

## 2022-09-13 PROCEDURE — 99211 OFF/OP EST MAY X REQ PHY/QHP: CPT

## 2022-09-13 PROCEDURE — 99999 PR OFFICE/OUTPT VISIT,PROCEDURE ONLY: CPT | Performed by: SURGERY

## 2022-09-13 PROCEDURE — 99214 OFFICE O/P EST MOD 30 MIN: CPT | Performed by: INTERNAL MEDICINE

## 2022-09-13 RX ORDER — CHOLECALCIFEROL (VITAMIN D3) 1250 MCG
CAPSULE ORAL
COMMUNITY
Start: 2022-08-05

## 2022-09-13 RX ORDER — TOPIRAMATE 25 MG/1
TABLET ORAL
Qty: 180 TABLET | Refills: 1 | Status: SHIPPED | OUTPATIENT
Start: 2022-09-13

## 2022-09-13 NOTE — PROGRESS NOTES
CC -   Follow up of: Left knee pain, weight gain (per pt she needs to be <278 lbs for knee surgery)    BACKGROUND -   Last visit: 8/12/22  First visit: 1/26/22    Obesity (all weight in lbs)  Began after having children (1995)  Initial BMI 52.24, Wt 318.75  HS Grad wt 160   Lowest   wt 160   Highest  wt 318.75  Pattern of wt gain: gradual with some fluctuation  Wt change past yr: similar to now  Most wt lost: none  Other diets attempted: not tried    Desire to lose weight: 10/10  Problem posed by appetite: 1-2/10    Initial Diet:    Number of meals per day - 1    Number of snacks per day - 2-3    Meal volume - 12\" plate, no seconds    Fast food/convenience store - 2x/week (eg sandwich or 2 slices of pizza)    Restaurants (not fast food) - 0x/week   Sweets - 3d/week (ice cream)   Chips - 7d/week (~160 Tam)   Crackers/pretzels - 0d/week   Nuts - 0d/week   Peanut Butter - 0-1d/week   Popcorn - 0d/week   Dried fruit - 0d/week   Whole fruit - 7d/week (watermelon, cantaloupes, apples, berries, grapes)   Breakfast cereal - 0d/week   Granola/Protein/Energy bar - 0d/week   Sugar sweetened beverages - occasional orange juice, 2-3 glasses of gingerale everyday (8-12 oz in total of gingerale + ice).    Protein - No supplements   Fiber - No supplements   Snacks: cheese+crackers, and noodle etc     Initial exercise:    Gym membership - no    Walking - no    Running - no    Resistance - no    Aerobic class - no    ______________________    [facility] -  Past Medical History:   Diagnosis Date    Chronic pain of left knee     Class 3 severe obesity due to excess calories without serious comorbidity with body mass index (BMI) of 50.0 to 59.9 in adult [facility])     Enlarged thyroid     Morbid obesity due to excess calories ([facility] [address]      Past Surgical History:   Procedure Laterality Date    ABDOMEN SURGERY      OVARIAN CYST REMOVAL, TUBAL LIGATION, C SECTION    HERNIA REPAIR      x 2    HYSTERECTOMY, TOTAL ABDOMINAL (CERVIX REMOVED)  5/6/2015     [place]    THYROIDECTOMY N/A 1/21/2022    RIGHT THYROID LOBECTOMY performed by Carollee Kehr, MD at [address] N/A 7/7/2022    EGD BIOPSY performed by Tiffany Rosenberg MD at [facility]     Prior to Admission medications    Medication Sig Start Date End Date Taking? Authorizing Provider   Cholecalciferol (VITAMIN D3) 1.25 MG (12060 UT) CAPS take 1 capsule by mouth every week until finished 8/5/22   Historical Provider, MD   topiramate (TOPAMAX) 25 MG tablet Start Topiramate 25 mg. Take one tablet twice each day for one week. If the appetite suppression is insufficient, then increase to two tablets twice daily. 8/12/22   Kalyn Knapp MD   FIBER ADULT GUMMIES PO Take 1 tablet by mouth daily    Historical Provider, MD   Multiple Vitamin (MVI, CELEBRATE, CHEWABLE TABLET) Take 1 tablet by mouth daily 2/23/22   Brook Flynn MD   8/12/22: Vitamin D was started    Allergies: Allergies   Allergen Reactions    Vicodin [Hydrocodone-Acetaminophen] Hives     Family history: DM: no, Heart disease: sister and brother. 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 ROS -  Card - no CP  GI - no N/V/D/C    PE -  Gen : /82 (Site: Left Upper Arm, Position: Sitting, Cuff Size: Thigh)   Pulse 71   Temp 97.9 °F (36.6 °C) (Temporal)   Ht 5' 5.5\" (1.664 m)   Wt 298 lb 3.2 oz (135.3 kg)   LMP 04/06/2015   BMI 48.87 kg/m²    Repeat BP: 151/90, HR85   WN, WD, NAD  Lung: Nml resp effort, CTA B/L  Heart:  RRR w/o MGR, no pitting edema noted  Psych: Normal mood   Full affect  Neuro: Moves all ext well  ______________________  Available lab results reviewed (6/28/22). HISTORY & ASSESSMENT/PLAN -     Problem 1  - Left knee pain   HPI   - pain on and off. about 40 lbs to undergo surgery(<278 lbs). Assessment  - fairly controlled   Plan   - Can take OTC Tylenol prn (currently not taking any). Was referred to pain clinic recently. Weight reduction planned as noted below. Problem 2  - Obesity   HPI   - See above Background for description    Weight  Date    318.8  1/26/22    313.2   2/23/22    314.2  3/31/22 *Phentermine    302.0  5/27/22    303.4  6/28/22 *Wellbutrin    303.4  8/12/22 Topiramate    298.2  9/13/22 continued    Total weight change to date: -20.6 lbs  Average daily energy variance:   1/26/22 - 2/23/22: -6.6 lbs (50031 Tam)/28 days = -825 Tam/day deficit. 2/23/2022 - 3/31/2022: +1 lbs (3500 Tam)/35 days = +100 Tam/day excess. 3/31/2022 - 5/27/2022: -12.2 lbs (67809 Tam)/56 days = -763 Tam/day deficit. 5/27/2022 - 6/28/2022: +1.4 lbs (4900 Tam)/31 days = +158 Tam/day excess. 6/28/2022 - 8/12/2022: +0 lbs (0 Tam)/45 days = +0 Tam/day excess. 8/12/2022 - 9/13/2022: -5.2 lbs (70624 Tam)/32 days = -569 Tam/day deficit. DEN = 2214 Tam/d    Update:  Calorie monitoring: VLCD  Sweets: 0 d/month  SSB: none  Restaurant food: 0x/wk  Appetite suppressant: Topiramate 25 mg bid, some tingling in right foot. Pt unsure how much it is helping, but would like to continue. Home BP monitoring: NA  2 protein shakes and lean cuisine for dinner. Still on fiber gummies. About 80 oz of water/day. Assessment  - improving    Plan   - She is doing well with VLCD, and would like to continue. She is unsure how much Topiramate is helping, but feels it is helping and would like to continue that as well - 3-month script sent. Will get BMP prior to next follow up. Her next goal is to be <288 lbs. Planned as follows  Patient Instructions   Continue current diet plan (VLCD). Continue Topiramate 25 mg twice a day. Bloodwork (BMP) prior to next follow up. Follow up in 2 months. Medication management - Topiramate. Win Izquierdo MD  Internal Medicine/Obesity Medicine  9/13/2022.

## 2022-09-13 NOTE — PROGRESS NOTES
9/13/22    Weight Check  Previous Weight: 8/3/22 - 300 lbs  Today's Weight: 9/13/22 - 295 lbs  Goal Weight: 285 lbs  Pt achieved pre-op weight loss goal: no    Current Diet:  Diet Type: VLCD  Pt compliant with VLCD diet: yes,     Protein Intake: 60 - 70 grams - Pt is not tracking enc pt to keep food records daily ([employer])  Water Intake: 80 oz  Other Fluid Intake: None  Total = 80 oz  Exercise - None    Pt Problems Noted: Pts quit date for smoking changed to July 6th  - Recheck date for smoking reviewed is Oct 6th       Pt is scheduled for 1 month f/u appt: yes,   Pt instructed to continue on VLCD Diet: yes,   Copy of VLCD Diet given to pt:yes -   Pt verbalized understanding.

## 2022-09-28 ENCOUNTER — OFFICE VISIT (OUTPATIENT)
Dept: BARIATRICS/WEIGHT MGMT | Age: 47
End: 2022-09-28
Payer: MEDICAID

## 2022-09-28 DIAGNOSIS — F50.9 COMPULSIVE EATING PATTERNS: Primary | ICD-10-CM

## 2022-09-28 DIAGNOSIS — F50.81: ICD-10-CM

## 2022-09-28 PROCEDURE — 90832 PSYTX W PT 30 MINUTES: CPT | Performed by: SOCIAL WORKER

## 2022-09-28 PROCEDURE — 1036F TOBACCO NON-USER: CPT | Performed by: SOCIAL WORKER

## 2022-09-28 NOTE — PATIENT INSTRUCTIONS
Assignments/Recommendations: Follow-up with SW as needed. Attend [facility] support group. Follow up with present providers/all scheduled appointment at [facility].

## 2022-09-28 NOTE — PROGRESS NOTES
INDIVIDUAL SESSION:  SUMMARY/PSYCH CLEARANCE     Ashley Tinajero is a 49 y.o. Single, -American  female, referred by Primary Care Provider  for evaluation and treatment. Patient identify verified by Name and . Those attending session : patient    DX:   Encounter Diagnoses   Name Primary? Compulsive eating patterns Yes    Mild binge-eating disorder, in full remission        Chief Complaint   Patient presents with    Consultation     3rd visit final clearance         Wt Readings from Last 3 Encounters:   22 298 lb 3.2 oz (135.3 kg)   22 297 lb 9.6 oz (135 kg)   22 (!) 303 lb 6.4 oz (137.6 kg)            Narrative: Torie Anne stated that she did complete the homework without any problems. She was able to identify problems with emotional eating patterns including problems feeling motivated to eat healthy. She stated that when she is having a \"bad day\" she finds it hard to eat regular meals. She stated that she is having grief over losses and agreed that she might benefit from seeing a therapist.   She was also able to identify things that triggers cravings for food. She shared that people (mom), times of the day (evenings), smells, and making food for others are triggers for her. Ashley stated that she has made some progress with compulsive eating patterns/binge eating and reported making the following changes: Torie Anne stated that she is making a conscious effort to get up and eat/drink a shake when she wakes up to start the day off right. She is following the modified diet and is losing weight. She also stated that when she has craving for foods she is \"thinking it through\" before eating. She stated that she knows the difference between real hunger and cravings. Torie Anne stated that focusing on work and her children helps to distract her from cravings. She also stated that she has a good support system, she stated that her children are her support.        Mental Status Exam: appearance:  appropriately dressed and appropriately groomed, behavior:  normal, attitude:  cooperative, speech:  appropriate, mood:  euthymic, affect:  congruent with mood, thought content:  no evidence of psychosis, thought process:  logical and coherent, orientation:  oriented in all spheres, memory:  recent:  good and remote:  good, insight:  fair , judgment:  fair , and cognitive:  intact and intelligent    RISK ASSESSMENT    Suicide screen: denies current suicidal ideation, plan and intent    Self Injurious Behavior: denies    Homicide screen: denies current homicidal ideation, plan and intent    TREATMENT PLAN:  Goal: Increase understanding of role of emotional factors contributing to issues with food and obesity and strategies to cope. Interventions in session:  Reviewed previous information provided to the patient and reinforced the need for continued engagement in support group and other resources of the [facility]. Provided Psychoeducational regarding physical, emotional, and behavioral changes that might occur for the patient post WLS. Assignments/Recommendations: Follow-up with SW as needed. Attend [facility] support group. Follow up with present providers/all scheduled appointment at [facility]. Next steps: Follow up with SW post surgery as needed. Bariatric Surgery: Final visit:  The patient has completed a Biopsychosocial assessment and 2 educational sessions to evaluate her appropriateness for bariatric surgery. This patient has been compliant with all scheduled sessions and completed all assignments/recommendations including attendance at least one support group meeting. She does present with mental health issues which appear to be stable and so would not interfere with her ability to adapt to the EBC changes that will be necessary for success. The patient appears motivated to make necessary changes and achieve weight loss goals.  Based on the information gathered during assessment and subsequent session it appears that she is a reasonable  candidate for Bariatric surgery.      Patient and/or family/guardian verbalizes understanding of and agreement with treatment recommendations and plan: yes    Start time: 8:48 am         End time: 9:20    Visit Time:  Janeth Hatfield 108, LISW

## 2022-10-17 ENCOUNTER — HOSPITAL ENCOUNTER (OUTPATIENT)
Age: 47
Discharge: HOME | End: 2022-10-17
Payer: MEDICAID

## 2022-10-17 DIAGNOSIS — E66.01 MORBID OBESITY DUE TO EXCESS CALORIES (HCC): ICD-10-CM

## 2022-10-17 DIAGNOSIS — E66.01 CLASS 3 SEVERE OBESITY DUE TO EXCESS CALORIES WITH SERIOUS COMORBIDITY AND BODY MASS INDEX (BMI) OF 45.0 TO 49.9 IN ADULT (HCC): ICD-10-CM

## 2022-10-17 DIAGNOSIS — Z87.891 FORMER SMOKER: ICD-10-CM

## 2022-10-17 LAB
ANION GAP SERPL CALCULATED.3IONS-SCNC: 14 MMOL/L (ref 7–16)
BUN BLDV-MCNC: 13 MG/DL (ref 6–20)
CALCIUM SERPL-MCNC: 8.6 MG/DL (ref 8.6–10.2)
CHLORIDE BLD-SCNC: 105 MMOL/L (ref 98–107)
CO2: 21 MMOL/L (ref 22–29)
CREAT SERPL-MCNC: 1.2 MG/DL (ref 0.5–1)
GFR AFRICAN AMERICAN: 58
GFR SERPL CREATININE-BSD FRML MDRD: 56 ML/MIN/1.73
GLUCOSE BLD-MCNC: 109 MG/DL (ref 74–99)
POTASSIUM SERPL-SCNC: 4.3 MMOL/L (ref 3.5–5)
SODIUM BLD-SCNC: 140 MMOL/L (ref 132–146)

## 2022-10-17 PROCEDURE — G0480 DRUG TEST DEF 1-7 CLASSES: HCPCS

## 2022-10-17 PROCEDURE — 80048 BASIC METABOLIC PNL TOTAL CA: CPT

## 2022-10-17 PROCEDURE — 36415 COLL VENOUS BLD VENIPUNCTURE: CPT

## 2022-10-20 ENCOUNTER — TELEPHONE (OUTPATIENT)
Dept: BARIATRICS/WEIGHT MGMT | Age: 47
End: 2022-10-20

## 2022-10-20 ENCOUNTER — INITIAL CONSULT (OUTPATIENT)
Dept: BARIATRICS/WEIGHT MGMT | Age: 47
End: 2022-10-20

## 2022-10-20 VITALS — BODY MASS INDEX: 48.82 KG/M2 | WEIGHT: 293 LBS | HEIGHT: 65 IN

## 2022-10-20 DIAGNOSIS — Z71.3 NUTRITIONAL COUNSELING: ICD-10-CM

## 2022-10-20 DIAGNOSIS — Z00.8 NUTRITIONAL ASSESSMENT: Primary | ICD-10-CM

## 2022-10-20 PROCEDURE — 99999 PR OFFICE/OUTPT VISIT,PROCEDURE ONLY: CPT | Performed by: DIETITIAN, REGISTERED

## 2022-10-20 NOTE — PROGRESS NOTES
10/20/22    Nicotine active in process       Weight Check  Previous Weight: 8/3/22 - 300 lbs, 9/13/22 - 295 lbs  Today's Weight:10/20/22   Goal Weight: 285 lbs  Pt achieved pre-op weight loss goal: no      Current Diet:  Diet Type: VLCD  Pt compliant with VLCD diet: no    Pt is not keeping food records - Pt was instructed by the Drew Moran that she / he needs to keep daily food records and bring the food records to all f/u appointments. Pt was instructed this is an important part of compliancy and long-term lifestyle changes and will help establish long-term weight loss and weight maintenance success after weight loss surgery. Drew Moran reviewed with the pt how to keep track of protein intake along with calories, fat and sugar content. Pt needs to be able to see that he / she is meeting his / her macro nutrient needs daily. Drew Moran reviewed different ways to keep foods records either manually or with computer apps. Pt was able to verbalize understanding. Failure to keep food records show poor compliancy following weight loss surgery. Pt has been given all the tools and education necessary to complete this task. Education spent with pt just on food records 20 minutes. Protein Intake:  - Pt is not sure due to not tracking but is drinking 2 protein shakes a day. Water Intake: 5 - 16.9 oz  Other Fluid Intake: None  Taking Daily Complete MVI:yes,      Pt Problems Noted: Compliancy. Pt was instructed she has to be at least 10 lbs below her starting weight or sx will be cx. Pt was instructed on VLCD Diet or Slim-Fast Diet as an option to get down to go wt. Pt verbalized understanding of the importance of following the diet and achieving her pre-op weight loss goal.     Pt is scheduled for 1 month f/u appt: no Scheduled for RYGB Class  Pt instructed to continue on VLCD Diet: yes,   Copy of VLCD Diet given to pt:yes -   Pt verbalized understanding.

## 2022-10-20 NOTE — TELEPHONE ENCOUNTER
Drew Moran called the pt and scheduled the pt for the following. Pt is aware he/she needs to be down to their pre-op weight loss goal when they come in for their weight check or their sx will be cx. Pt verbalized understanding. Pre-op weight loss goal reviewed. Pt scheduled for the following see below. Pt is aware supplements are cash, check, credit or debt card. SX Type: RYGB  SX Date: ???  H&P Appt: Dr. Ping Hannon ???  Weight Check Appt: This will occur after the 3 hour class at the [address] [place] [address] - Pt is aware she has to be 10 lbs below her starting wt or Dr. Ping Hannon will cx sx. VLCD and Slim Fast diet given to pt. Pt gained wt at her wt check today. 3 Hour Class: At the [address] [place] [address] From 8:00 - 11:00 pm on - 11/2/22 - RYGB Class  Supplements: Pt was provided a handout on approved protein supplements for use after WLS. (Handout - Given to pt 10/20/22 )Pt was instructed he / she will need to bring his / her protein supplements to the class in order to move forward with sx or sx can be cx. Handouts reviewed and provided. Pt verbalized understanding. Pt will purchase the Bariatric approved MVI, Fe+, Ca+ and Vit D at the [address] [place] [address]. 2 Hour Pt Education Book: Pt needs      Pt was instructed he / she can call any time with questions. Goal Weight 285 lbs - Pt has copy of pre-op diet. Enc pt to follow pre-op diet to get down to pre-op weight loss goal. Pt verbalized understanding.   Pt is aware sx can be cx by his / her surgeon at H&P appt if he / she feels pt has not achieved pre-op weight loss goal.

## 2022-10-22 LAB
COTININE: <5 NG/ML
NICOTINE: <5 NG/ML

## 2022-10-26 ENCOUNTER — TELEPHONE (OUTPATIENT)
Dept: BARIATRICS/WEIGHT MGMT | Age: 47
End: 2022-10-26

## 2022-10-26 NOTE — LETTER
Medical Clearance / Medical Necessity for Chantel-en-Y Gastric Bypass    Name: Kingston Hancock                                     YOB: 1975    I have been caring for the above patient for _____ years. He/she suffers from the following medical conditions:  __________________________________________________________________________________________________________________________________________________________________________________________________________________    The above medical conditions are either caused by or worsened by the patients morbid obesity. Yes_________ No__________    The above medical conditions are currently stable:      Yes_________ No__________    The following medical conditions are difficult to manage/require multiple medications to achieve control due to the patients weight: ______________________________________________________________________  ______________________________________________________________________                    The above patient has participated in the following medical weight loss efforts without long-term weight reduction:  ____________________________________________________________________________________________________________________________________________    I am in support of my patient undergoing a weight loss surgical procedure with [address] [place] [address] [facility] and the patient understands the risks and benefits of weight loss surgery. The patient has reasonable expectations and I believe the patient will be compliant with all post-surgical requirements. I understand the program is comprehensive with dedicated and specially trained staff.  In the event that my patient is over the age of 61, I would like to state that the patients physiological age and co-morbid conditions result in a positive risk to benefit ratio.    ________ I understand that extended-release medications are not recommended after bariatric surgery and, if possible, I will change my patients medications to another option. If my patient is diabetic, we will establish a plan for medications and blood sugar checks for post-surgery. These medications may need to be adjusted or stopped after weight loss surgery. The plan after surgery will be:  ________________________________________________________________________________________________________________________________________________________________________________________________________________________________________________________________________________________              If you do not currently manage your patients diabetic medications, what physician does? _____________________________________________________________________        ________  In my medical opinion, there are no medical contraindications to proceed with gastric bypass surgery and the patients benefits of surgery outweigh the risks of surgery.       Physician Name (Please Print): __________________________________    Primary Care Physicians Signature: __________________________________    Date: ______/______/______

## 2022-10-26 NOTE — LETTER
Date: 10-      [facility] Plan:  Attention Prior Authorization    Regarding:  Kingston Hancock  Member ID:  [id number]    Request:     Authorization for CPT 43571  (Laparoscopic Chantel-en-Y)                      Diagnosis Codes:  E66.01; M19.90; K21.9    Physician: Lisa Doran M.D.  ([facility] [id number])           (NPI [id number])    Facility: [facility] [id number] (NPI [id number])    [address]       Dear Kenya Serrano or :     Pre-determination of insurance coverage, and authorization for hospitalization and surgical treatment are requested on behalf of your annuitant Princess Ramirez for diagnoses of morbid obesity, degenerative joint disease and GERD. Princess Ramirez is 5'5, weighs 301 lb., and has a BMI of 50. She has been severely obese for a number of years despite many years of dietary efforts. She has lost weight through these efforts, however has been unable to maintain satisfactory weight loss. Princess Ramirez has been evaluated in our bariatric program and is felt to be an excellent candidate  for surgery. The patient has undergone extensive pre-operative education and understands all the risks, benefits and possible complications of surgery. She has also undergone thorough nutritional evaluation and counseling with our registered dietician. Our program provides long term nutritional counseling with unlimited consults with the dietician. All patients are nicotine tested and require a negative nicotine level prior to surgery. Patient has been educated about the risks associated with substance use, as well as the risks of using nicotine and alcohol after surgery. Patient has been educated that these substances need to be avoided lifelong after surgery to reduce the risk of complications and sub-optimal weight loss.     We are accredited as a [address] through [facility] and as such we have a multidisciplinary preoperative program. Tiffany Fleming did participate and comply fully with the preoperative preparatory program which included surgical evaluation, cardiac workup and clearance, psychological evaluation and clearance, nutritional evaluation and 4 months of provider supervised dietary counseling. She has also attended pre-operative support group meetings facilitated by our LISW and is encouraged to continue attendance post-operatively. I am requesting authorization for Laparoscopic Chantel-en-Y Gastric Bypass, procedure code 49449, with a hospital stay of 1 day, to be performed for the treatment of the patients severe and life threatening diseases. This procedure will be performed at [address] [place] [address], [facility]. I appreciate your consideration in this matter and your timely response. Supporting clinical documents are included with this request.    Electronically signed by Dr. Marianne Hernandez M.D.   Bariatric Surgery

## 2022-10-26 NOTE — TELEPHONE ENCOUNTER
Ready for insurance submission, discussed with patient. Case prepared and submitted online to [facility] with request for LRYGB 12-6-2022. Online confirmation received. Also discussed need for medical clearance. Request faxed to PCP and patient was instructed to call office and ask if appointment is needed.

## 2022-11-02 ENCOUNTER — INITIAL CONSULT (OUTPATIENT)
Dept: BARIATRICS/WEIGHT MGMT | Age: 47
End: 2022-11-02

## 2022-11-02 DIAGNOSIS — Z00.8 NUTRITIONAL ASSESSMENT: Primary | ICD-10-CM

## 2022-11-02 DIAGNOSIS — Z71.3 NUTRITIONAL COUNSELING: ICD-10-CM

## 2022-11-02 PROCEDURE — 99999 PR OFFICE/OUTPT VISIT,PROCEDURE ONLY: CPT | Performed by: SURGERY

## 2022-11-07 ENCOUNTER — TELEPHONE (OUTPATIENT)
Dept: BARIATRICS/WEIGHT MGMT | Age: 47
End: 2022-11-07

## 2022-11-07 ENCOUNTER — PREP FOR PROCEDURE (OUTPATIENT)
Dept: BARIATRICS/WEIGHT MGMT | Age: 47
End: 2022-11-07

## 2022-11-07 NOTE — TELEPHONE ENCOUNTER
Per order of Dr. Mai Curran patient is ready to be scheduled for LRYGB. Call placed to patient and she is in agreement with surgery on 12-6-22. Pre-op class is completed and patient knows she will need to purchase supplements at that visit. Pre-op letter will be mailed. She is working towards pre-op weight loss goal.   She does not smoke and will refrain from alcohol use. We reviewed at length all meds to avoid 2 weeks prior to surgery and patient voiced understanding. This list is in the pre-op letter which will be mailed to patient. Reviewed with patient instructions for Gatorade the night before surgery. Included written instructions with pre-op letter with consideration if patient is on insulin. She uses a CPAP with a setting of does not use. She has final medical clearance appointment scheduled. Instructed to discuss with PCP extended release medications. Patient placed on [employer] calendar. Patient surgery ordered in [employer]. Patient denies latex allergy. Patient denies PONV.

## 2022-11-10 VITALS — BODY MASS INDEX: 48.82 KG/M2 | WEIGHT: 293 LBS | HEIGHT: 65 IN

## 2022-11-10 NOTE — PROGRESS NOTES
Patient attended a 3 Hour Initial Nutrition Consult Class for RYGB on 11/2/22. Patient was able to verbalize understanding of the class. [id number] [facility] and [facility]  Nutrition History and Physical     Reuben Mayer    Surgery Type: RYGB  Today's Date: 11/10/22    yes  Patient attended a 3 hour nutrition education class on RYGB - 11/2/22, and was given written educational material.  Patient signed and verbalized understanding of nutrition therapy for Bariatric Surgery. Assessment:              Vitals:    11/10/22 1113   Weight: (!) 306 lb (138.8 kg)   Height: 5' 5\" (1.651 m)    Height: 5' 5\" (1.651 m) Weight: (!) 306 lb (138.8 kg)   BMI:Body mass index is 50.92 kg/m². Food Allergies and Allergies: No    Food Intolerances: No   Eating Problems:No  Chewing Problems:No  Swallowing Problems:No    Current Vitamins/Supplements and Medications:  Current Outpatient Medications:     Cholecalciferol (VITAMIN D3) 1.25 MG (01605 UT) CAPS, take 1 capsule by mouth every week until finished, Disp: , Rfl:     topiramate (TOPAMAX) 25 MG tablet, Start Topiramate 25 mg. Take one tablet twice each day for one week. If the appetite suppression is insufficient, then increase to two tablets twice daily. , Disp: 180 tablet, Rfl: 1    FIBER ADULT GUMMIES PO, Take 1 tablet by mouth daily, Disp: , Rfl:     Multiple Vitamin (MVI, CELEBRATE, CHEWABLE TABLET), Take 1 tablet by mouth daily, Disp: 90 tablet, Rfl: 1  _    Please check all that apply:  No - Patient did not lose 10% of excess body weight prior to surgery  Yes - Patient  is able to verbalize a Bariatric Full Liquid Diet. Yes - Patient is able to verbalize the usage & importance of Protein Supplements. Yes- Patient purchased 3 month supply of protein vitamins and calcium. YES - Patient is instructed to follow a low-fat diet from now until surgery date.   YES - Patient is instructed to take 30 grams of a protein supplement from  now until surgery date in addition to low-fat diet guidelines. YES - Patient is instructed to consume 64 ounces of water daily from now until surgery date.  ________________________________________________________________________  Yes - Patient did not lose 10% of excess body weight prior to surgery   - Patient has to follow the VLCD Diet and return to [facility] for weigh-in on H&P   - At weigh in appointment patient must weigh 285 lbs or surgery can be cancelled. ________________________________________________________________________  Yes - Patient did purchase 3 month supply of protein, vitamins and Calcium.     Comments:   [facility] Supplements  - Isopure = 25 grams of protein per scoop

## 2022-11-10 NOTE — PATIENT INSTRUCTIONS
[email or web address] and [facility]  Dietary Follow-up Appointment Instructions    Cintia Deluna   Date: 11/10/2022     The RD / LD reviewed the following instructions with the patient and handouts have been given. Pt. is able to verbalize instruction and has been instructed to call with any problems or complications. If patient is not able to comply with the dietary or supplement instructions given pt. is instructed to call the [facility] [place] at [phone]. Patient has been instructed to continue to follow a low-fat diet from today's date until the day before surgery. Patient has been instructed to drink 64 oz. of water daily eliminating carbonated and caffeinated beverages.     ________________________________________________________________________  10% Of Excess Body Weight Requirement:    Since patient did not lose 10% of excess body weight the patient has been instructed to follow The VLCD Diet and return to the [facility] on H&P    At weigh in appointment patient must weigh 285 lbs  _______________________________________________________________________

## 2022-11-18 ENCOUNTER — OFFICE VISIT (OUTPATIENT)
Dept: BARIATRICS/WEIGHT MGMT | Age: 47
End: 2022-11-18
Payer: MEDICAID

## 2022-11-18 VITALS
DIASTOLIC BLOOD PRESSURE: 81 MMHG | WEIGHT: 293 LBS | RESPIRATION RATE: 20 BRPM | BODY MASS INDEX: 48.82 KG/M2 | HEIGHT: 65 IN | TEMPERATURE: 97.1 F | SYSTOLIC BLOOD PRESSURE: 143 MMHG | HEART RATE: 78 BPM

## 2022-11-18 DIAGNOSIS — Z98.890 PONV (POSTOPERATIVE NAUSEA AND VOMITING): ICD-10-CM

## 2022-11-18 DIAGNOSIS — K21.9 GASTROESOPHAGEAL REFLUX DISEASE WITHOUT ESOPHAGITIS: ICD-10-CM

## 2022-11-18 DIAGNOSIS — K91.2 MALNUTRITION FOLLOWING GASTROINTESTINAL SURGERY: Primary | ICD-10-CM

## 2022-11-18 DIAGNOSIS — R11.2 PONV (POSTOPERATIVE NAUSEA AND VOMITING): ICD-10-CM

## 2022-11-18 PROCEDURE — G8427 DOCREV CUR MEDS BY ELIG CLIN: HCPCS | Performed by: SURGERY

## 2022-11-18 PROCEDURE — G8417 CALC BMI ABV UP PARAM F/U: HCPCS | Performed by: SURGERY

## 2022-11-18 PROCEDURE — 1036F TOBACCO NON-USER: CPT | Performed by: SURGERY

## 2022-11-18 PROCEDURE — 99213 OFFICE O/P EST LOW 20 MIN: CPT

## 2022-11-18 PROCEDURE — 99214 OFFICE O/P EST MOD 30 MIN: CPT | Performed by: SURGERY

## 2022-11-18 PROCEDURE — G8484 FLU IMMUNIZE NO ADMIN: HCPCS | Performed by: SURGERY

## 2022-11-18 RX ORDER — ONDANSETRON 4 MG/1
4 TABLET, ORALLY DISINTEGRATING ORAL EVERY 8 HOURS PRN
Qty: 15 TABLET | Refills: 0 | Status: SHIPPED | OUTPATIENT
Start: 2022-11-18

## 2022-11-18 RX ORDER — OMEPRAZOLE 20 MG/1
20 CAPSULE, DELAYED RELEASE ORAL DAILY
Qty: 30 CAPSULE | Refills: 12 | Status: SHIPPED | OUTPATIENT
Start: 2022-11-18 | End: 2023-11-18

## 2022-11-18 NOTE — PROGRESS NOTES
Nelly Bran  11/18/2022  [id number]  [facility]  Surgical [facility] Center [facility]               History and Physical  Gastric Bypass     CHIEF COMPLAINT: Morbid obesity, GERD, Degenerative Joint Disease (DJD) and Extremity Edema    HISTORY OF PRESENT ILLNESS: Nelly Bran is a morbidly-obese 49 y.o.  female, who weighs (!) 303 lb (137.4 kg). She is 148 pounds over her ideal body weight. The Body mass index is 50.42 kg/m². She has gained 2 pounds over the past several months in preparation for surgery. She has multiple medical problems aggravated by her obesity. She wishes to have a gastric bypass so that she can lose more weight and keep the weight off. I have met with her on 2 different occasions in the [facility], where we discussed the surgery in great detail and went over the risks and benefits. She has watched our informational video so she understands all of the extensive risks involved. She states that she understands all of these risks and wishes to proceed. POSTOPERATIVE DIAGNOSES:   (1) No Hiatal Hernia, Effacement Hill Grade I  (2) No Esophagitis  (3) No Gastritis  (4) No Duodenitis    Allergies   Allergen Reactions    Vicodin [Hydrocodone-Acetaminophen] Hives       Current Outpatient Medications on File Prior to Visit   Medication Sig Dispense Refill    Cholecalciferol (VITAMIN D3) 1.25 MG (06509 UT) CAPS take 1 capsule by mouth every week until finished      topiramate (TOPAMAX) 25 MG tablet Start Topiramate 25 mg. Take one tablet twice each day for one week. If the appetite suppression is insufficient, then increase to two tablets twice daily. 180 tablet 1    FIBER ADULT GUMMIES PO Take 1 tablet by mouth daily      Multiple Vitamin (MVI, CELEBRATE, CHEWABLE TABLET) Take 1 tablet by mouth daily 90 tablet 1     No current facility-administered medications on file prior to visit.        Past Medical History:   Diagnosis Date    Chronic pain of left knee Class 3 severe obesity due to excess calories without serious comorbidity with body mass index (BMI) of 50.0 to 59.9 in adult [facility])     Enlarged thyroid     Morbid obesity due to excess calories ([facility] [address]        Past Surgical History:   Procedure Laterality Date    ABDOMEN SURGERY      OVARIAN CYST REMOVAL, TUBAL LIGATION, C SECTION    HERNIA REPAIR      x 2    HYSTERECTOMY, TOTAL ABDOMINAL (CERVIX REMOVED)  5/6/2015    Dr. Tiago Bahena N/A 1/21/2022    RIGHT THYROID LOBECTOMY performed by Miles Moyer MD at [address] [place] [address] N/A 7/7/2022    EGD BIOPSY performed by Calixto Gibson MD at [place] [address]       Current Outpatient Medications   Medication Sig Dispense Refill    omeprazole (PRILOSEC) 20 MG delayed release capsule Take 1 capsule by mouth Daily 30 capsule 12    ondansetron (ZOFRAN-ODT) 4 MG disintegrating tablet Take 1 tablet by mouth every 8 hours as needed for Nausea or Vomiting 15 tablet 0    Cholecalciferol (VITAMIN D3) 1.25 MG (59414 UT) CAPS take 1 capsule by mouth every week until finished      topiramate (TOPAMAX) 25 MG tablet Start Topiramate 25 mg. Take one tablet twice each day for one week. If the appetite suppression is insufficient, then increase to two tablets twice daily. 180 tablet 1    FIBER ADULT GUMMIES PO Take 1 tablet by mouth daily      Multiple Vitamin (MVI, CELEBRATE, CHEWABLE TABLET) Take 1 tablet by mouth daily 90 tablet 1     No current facility-administered medications for this visit.        Allergies   Allergen Reactions    Vicodin [Hydrocodone-Acetaminophen] Hives       Family History   Problem Relation Age of Onset    High Blood Pressure Father     Cancer Father     High Blood Pressure Sister     Asthma Sister     Heart Disease Brother     High Blood Pressure Brother     Asthma Brother        Social History     Socioeconomic History    Marital status: Single     Spouse name: Not on file    Number of children: Not on file    Years of education: Not on file    Highest education level: Not on file   Occupational History    Not on file   Tobacco Use    Smoking status: Former    Smokeless tobacco: Never    Tobacco comments:     quit 1 week ago   Vaping Use    Vaping Use: Never used   Substance and Sexual Activity    Alcohol use: Not Currently    Drug use: No    Sexual activity: Not on file   Other Topics Concern    Not on file   Social History Narrative    Not on file     Social Determinants of Health     Financial Resource Strain: Not on file   Food Insecurity: Not on file   Transportation Needs: Not on file   Physical Activity: Not on file   Stress: Not on file   Social Connections: Not on file   Intimate Partner Violence: Not on file   Housing Stability: Not on file       Review of Systems  General ROS: negative for - chills, fatigue or malaise  ENT ROS: negative for - hearing change, nasal congestion or nasal discharge  Allergy and Immunology ROS: negative for - hives, itchy/watery eyes or nasal congestion  Hematological and Lymphatic ROS: negative for - blood clots, blood transfusions, bruising or fatigue  Endocrine ROS: negative for - malaise/lethargy, mood swings, palpitations or polydipsia/polyuria  Respiratory ROS: negative for - sputum changes, stridor, tachypnea or wheezing  Cardiovascular ROS: negative for - irregular heartbeat, loss of consciousness, murmur or orthopnea  Gastrointestinal ROS: negative for - constipation, diarrhea, gas/bloating, heartburn or hematemesis  Genito-Urinary ROS: negative for -  genital discharge, genital ulcers or hematuria  Musculoskeletal ROS: negative for - gait disturbance, muscle pain or muscular weakness  Psychiatric ROS: negative for - visual or auditory hallucinations, suicidal ideation      Physical Exam:   Vitals: BP (!) 143/81 (Site: Right Lower Arm, Position: Sitting, Cuff Size: Medium Adult)   Pulse 78   Temp 97.1 °F (36.2 °C) (Temporal)   Resp 20   Ht 5' 5\" (1.651 m)   Wt (!) 303 lb (137.4 kg)   LMP 04/06/2015   BMI 50.42 kg/m²     General appearance: AAO, NAD  Eyes: PERRL, EOMI, red conjunctiva  Lungs: CTAB, no wheeze, no rhonchi  Chest wall: atraumatic, no tenderness, no echymosis or abrasions  Heart: reg rate, no murmur  Abdomen: soft, nondistended, nontender  Extremities: full ROM all 4 ext, no gross motor or sensory deficits  Pulses: 2+ distal  Skin: warm and dry  Neurologic: spontanous eye opening, purposeful, follows complex commands  Psych: No hallucinations      Assessment:  Morbid obesity with failure of conservative therapy. Patient has been cleared psychologically and medically. The gall bladder ultrasound showed fatty liver. Upper endoscopy showed no hiatal hernia. The patient was informed that risks include, but are not limited to: death, anastomotic leak, obstruction, bleeding, and sepsis. Any of these could require further surgery. Other risks include heart attack, DVT, PE, pneumonia, hernia, wound infection, the need for dilatations of the gastrojejunostomy, and the inability to lose appropriate weight and keep it off. We may not be able to do a Gastic Bypass, in that case we will do a Sleeve Gastrectomy. We discussed that our goal is to ameliorate the medical problems and not to obtain a specific body mass index. She understands the risks and benefits and wishes to proceed with the procedure. She has signed a consent form. Plan:  (31012) Laparoscopic Chantel-en-Y Gastric Bypass possible hiatal hernia repair. She does not need Lovenox post-op.     NEEDS TO LOSE 5lbs before surgery or significant risk of aborting due to hepatomegaly    Physician Signature: Electronically signed by Dr. Kiko Bauman MD    Send copy of H&P to PCP, Joan Mace DO

## 2022-12-02 ENCOUNTER — HOSPITAL ENCOUNTER (OUTPATIENT)
Dept: PREADMISSION TESTING | Age: 47
Discharge: HOME | End: 2022-12-02
Payer: MEDICAID

## 2022-12-02 VITALS
RESPIRATION RATE: 18 BRPM | TEMPERATURE: 97 F | HEIGHT: 65 IN | OXYGEN SATURATION: 97 % | HEART RATE: 77 BPM | BODY MASS INDEX: 48.82 KG/M2 | WEIGHT: 293 LBS

## 2022-12-02 DIAGNOSIS — E66.01 MORBID OBESITY (HCC): Primary | ICD-10-CM

## 2022-12-02 LAB
ANION GAP SERPL CALCULATED.3IONS-SCNC: 9 MMOL/L (ref 7–16)
BUN BLDV-MCNC: 10 MG/DL (ref 6–20)
CALCIUM SERPL-MCNC: 8.6 MG/DL (ref 8.6–10.2)
CHLORIDE BLD-SCNC: 102 MMOL/L (ref 98–107)
CO2: 25 MMOL/L (ref 22–29)
CREAT SERPL-MCNC: 0.8 MG/DL (ref 0.5–1)
GFR SERPL CREATININE-BSD FRML MDRD: >60 ML/MIN/1.73
GLUCOSE BLD-MCNC: 88 MG/DL (ref 74–99)
HCT VFR BLD CALC: 37.8 % (ref 34–48)
HEMOGLOBIN: 12.5 G/DL (ref 11.5–15.5)
MCH RBC QN AUTO: 30 PG (ref 26–35)
MCHC RBC AUTO-ENTMCNC: 33.1 % (ref 32–34.5)
MCV RBC AUTO: 90.6 FL (ref 80–99.9)
PDW BLD-RTO: 13.8 FL (ref 11.5–15)
PLATELET # BLD: 250 E9/L (ref 130–450)
PMV BLD AUTO: 9.8 FL (ref 7–12)
POTASSIUM REFLEX MAGNESIUM: 4 MMOL/L (ref 3.5–5)
RBC # BLD: 4.17 E12/L (ref 3.5–5.5)
SODIUM BLD-SCNC: 136 MMOL/L (ref 132–146)
WBC # BLD: 9.3 E9/L (ref 4.5–11.5)

## 2022-12-02 PROCEDURE — 80048 BASIC METABOLIC PNL TOTAL CA: CPT

## 2022-12-02 PROCEDURE — 85027 COMPLETE CBC AUTOMATED: CPT

## 2022-12-02 PROCEDURE — 36415 COLL VENOUS BLD VENIPUNCTURE: CPT

## 2022-12-02 NOTE — PROGRESS NOTES
[facility]                                                                                                                    PRE OP INSTRUCTIONS FOR  Ashwin Serrano        Date: 12/2/2022    Date of surgery: 12/6/22   Arrival Time: Hospital will call you between 5pm and 7pm with your final arrival time for surgery    Do not eat  anything after midnight prior to surgery. This includes no water, chewing gum, mints or ice chips. Follow dr David westbrook clear liquid/ gatorade instructions    Take the following medications with a small sip of water on the morning of Surgery: none     Diabetics may take evening dose of insulin but none after midnight. If you feel symptomatic or low blood sugar morning of surgery drink 1-2 ounces of apple juice only. Aspirin, Ibuprofen, Advil, Naproxen, Vitamin E and other Anti-inflammatory products should be stopped  before surgery  as directed by your physician. Take Tylenol only unless instructed otherwise by your surgeon. Check with your Doctor regarding stopping Plavix, Coumadin, Lovenox, Eliquis, Effient, or other blood thinners. Do not smoke,use illicit drugs and do not drink any alcoholic beverages 24 hours prior to surgery. You may brush your teeth the morning of surgery. DO NOT SWALLOW WATER    You MUST make arrangements for a responsible adult to take you home after your surgery. You will not be allowed to leave alone or drive yourself home. It is strongly suggested someone stay with you the first 24 hrs. Your surgery will be cancelled if you do not have a ride home. Please wear simple, loose fitting clothing to the hospital.  Arthea Hippo not bring valuables (money, credit cards, checkbooks, etc.) Do not wear any makeup (including no eye makeup) or nail polish on your fingers or toes. DO NOT wear any jewelry or piercings on day of surgery. All body piercing jewelry must be removed.     Shower the night before surgery with ___Antibacterial soap    If you have a Living Will and Durable Power of  for Healthcare, please bring in a copy. If appropriate bring crutches, inspirex, WALKER, CANE etc... Notify your Surgeon if you develop any illness between now and surgery time, cough, cold, fever, sore throat, nausea, vomiting, etc.  Please notify your surgeon if you experience dizziness, shortness of breath or blurred vision between now & the time of your surgery. If you have ___dentures, they will be removed before going to the OR; we will provide you a container. If you wear ___contact lenses or ___glasses, they will be removed; please bring a case for them. To provide excellent care visitors will be limited to 1 in the room at any given time. During flu season no children under the age of 15 are permitted in the hospital for the safety of all patients. Other                  Please call AMBULATORY CARE if you have any further questions.    [address] [facility]     [address]

## 2022-12-05 ENCOUNTER — ANESTHESIA EVENT (OUTPATIENT)
Dept: OPERATING ROOM | Age: 47
DRG: 403 | End: 2022-12-05
Payer: MEDICAID

## 2022-12-05 NOTE — ANESTHESIA PRE PROCEDURE
Department of Anesthesiology  Preprocedure Note       Name:  Cintia Deluna   Age:  49 y.o.  :  1975                                          MRN:  [id number]         Date:  2022      Surgeon: Graciela Ridley):  Maeve Sylvester MD    Procedure: Procedure(s):  GASTRIC BYPASS CALLIE-EN-Y LAPAROSCOPIC ROBOTIC XI    Medications prior to admission:   Prior to Admission medications    Medication Sig Start Date End Date Taking? Authorizing Provider   omeprazole (PRILOSEC) 20 MG delayed release capsule Take 1 capsule by mouth Daily 22  Maeve Sylvester MD   ondansetron (ZOFRAN-ODT) 4 MG disintegrating tablet Take 1 tablet by mouth every 8 hours as needed for Nausea or Vomiting 22   Maeve Sylvester MD   Cholecalciferol (VITAMIN D3) 1.25 MG (91741 UT) CAPS take 1 capsule by mouth every week until finished 22   Historical Provider, MD   topiramate (TOPAMAX) 25 MG tablet Start Topiramate 25 mg. Take one tablet twice each day for one week. If the appetite suppression is insufficient, then increase to two tablets twice daily. 22   aKlyn Knapp MD   FIBER ADULT GUMMIES PO Take 1 tablet by mouth daily    Historical Provider, MD   Multiple Vitamin (MVI, CELEBRATE, CHEWABLE TABLET) Take 1 tablet by mouth daily 22   Hansa Knapp MD       Current medications:    Current Outpatient Medications   Medication Sig Dispense Refill    omeprazole (PRILOSEC) 20 MG delayed release capsule Take 1 capsule by mouth Daily 30 capsule 12    ondansetron (ZOFRAN-ODT) 4 MG disintegrating tablet Take 1 tablet by mouth every 8 hours as needed for Nausea or Vomiting 15 tablet 0    Cholecalciferol (VITAMIN D3) 1.25 MG (85950 UT) CAPS take 1 capsule by mouth every week until finished      topiramate (TOPAMAX) 25 MG tablet Start Topiramate 25 mg. Take one tablet twice each day for one week. If the appetite suppression is insufficient, then increase to two tablets twice daily.  180 tablet 1    FIBER ADULT GUMMIES PO Take 1 tablet by mouth daily      Multiple Vitamin (MVI, CELEBRATE, CHEWABLE TABLET) Take 1 tablet by mouth daily 90 tablet 1     No current facility-administered medications for this visit. 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    Chronic pain of left knee M25.562, G89.29       Past Medical History:        Diagnosis Date    Chronic pain of left knee     Class 3 severe obesity due to excess calories without serious comorbidity with body mass index (BMI) of 50.0 to 59.9 in adult ([facility] [address]     Enlarged thyroid     Morbid obesity due to excess calories ([facility] [address]        Past Surgical History:        Procedure Laterality Date    ABDOMEN SURGERY      OVARIAN CYST REMOVAL, TUBAL LIGATION, C SECTION    HERNIA REPAIR      x 2    HYSTERECTOMY, TOTAL ABDOMINAL (CERVIX REMOVED)  5/6/2015    Dr. [address] Baptiste N/A 1/21/2022    RIGHT THYROID LOBECTOMY performed by Bryan Hernandez MD at [facility] [address] N/A 7/7/2022    EGD BIOPSY performed by Keri Fortune MD at [address] History:    Social History     Tobacco Use    Smoking status: Former    Smokeless tobacco: Never    Tobacco comments:     quit 1 week ago   Substance Use Topics    Alcohol use: Not Currently                                Counseling given: Not Answered  Tobacco comments: quit 1 week ago      Vital Signs (Current): There were no vitals filed for this visit.                                            BP Readings from Last 3 Encounters:   11/18/22 (!) 143/81   09/13/22 127/82   09/12/22 124/80       NPO Status: BMI:   Wt Readings from Last 3 Encounters:   12/02/22 (!) 303 lb (137.4 kg)   11/18/22 (!) 303 lb (137.4 kg)   11/10/22 (!) 306 lb (138.8 kg)     There is no height or weight on file to calculate BMI.    CBC:   Lab Results   Component Value Date/Time    WBC 9.3 12/02/2022 12:15 PM    RBC 4.17 12/02/2022 12:15 PM    HGB 12.5 12/02/2022 12:15 PM    HCT 37.8 12/02/2022 12:15 PM    MCV 90.6 12/02/2022 12:15 PM    RDW 13.8 12/02/2022 12:15 PM     12/02/2022 12:15 PM       CMP:   Lab Results   Component Value Date/Time     12/02/2022 12:15 PM    K 4.0 12/02/2022 12:15 PM     12/02/2022 12:15 PM    CO2 25 12/02/2022 12:15 PM    BUN 10 12/02/2022 12:15 PM    CREATININE 0.8 12/02/2022 12:15 PM    GFRAA 58 10/17/2022 04:43 PM    LABGLOM >60 12/02/2022 12:15 PM    GLUCOSE 88 12/02/2022 12:15 PM    PROT 7.2 06/28/2022 09:25 AM    CALCIUM 8.6 12/02/2022 12:15 PM    BILITOT 0.4 06/28/2022 09:25 AM    ALKPHOS 64 06/28/2022 09:25 AM    AST 15 06/28/2022 09:25 AM    ALT 11 06/28/2022 09:25 AM       POC Tests: No results for input(s): POCGLU, POCNA, POCK, POCCL, POCBUN, POCHEMO, POCHCT in the last 72 hours.     Coags: No results found for: PROTIME, INR, APTT    HCG (If Applicable):   Lab Results   Component Value Date    PREGTESTUR negative 05/06/2015        ABGs: No results found for: PHART, PO2ART, ZQI1IYO, IYO8LDJ, BEART, [id number]     Type & Screen (If Applicable):  No results found for: LABABO, LABRH    Drug/Infectious Status (If Applicable):  No results found for: HIV, HEPCAB    COVID-19 Screening (If Applicable):   Lab Results   Component Value Date/Time    COVID19 Not Detected 01/18/2022 11:52 AM           Anesthesia Evaluation  Patient summary reviewed and Nursing notes reviewed no history of anesthetic complications:   Airway: Mallampati: III  TM distance: >3 FB   Neck ROM: full  Mouth opening: > = 3 FB   Dental: normal exam     Comment: Dentition intact, multiple missing molars, denies any loose teeth. Pulmonary:normal exam  breath sounds clear to auscultation  (+) decreased breath sounds                           ROS comment: Former smoker: 0.25 PPD x 20 years  Quit: August 2022   Cardiovascular:  Exercise tolerance: good (>4 METS),   (+) hyperlipidemia        Rhythm: regular  Rate: normal                    Neuro/Psych:                ROS comment: H/O Motion sickness GI/Hepatic/Renal:   (+) morbid obesity ( Super morbid obesity BMI: 50.42)          Endo/Other:    (+) : arthritis: OA., malignancy/cancer. (-) blood dyscrasia, no electrolyte abnormalities                ROS comment:  Enlarged thyroid Abdominal:   (+) obese (  Super morbid obesity BMI: 50.42 ),           Vascular: negative vascular ROS. Other Findings:             Anesthesia Plan      general     ASA 3       Induction: intravenous. MIPS: Postoperative opioids intended and Prophylactic antiemetics administered. Anesthetic plan and risks discussed with patient. Plan discussed with CRNA.                   Nikkie Aiken MD  12-6-22  NIMCO Poon - CRNA

## 2022-12-06 ENCOUNTER — HOSPITAL ENCOUNTER (INPATIENT)
Age: 47
LOS: 1 days | Discharge: HOME | DRG: 403 | End: 2022-12-07
Attending: SURGERY | Admitting: SURGERY
Payer: MEDICAID

## 2022-12-06 ENCOUNTER — ANESTHESIA (OUTPATIENT)
Dept: OPERATING ROOM | Age: 47
DRG: 403 | End: 2022-12-06
Payer: MEDICAID

## 2022-12-06 DIAGNOSIS — E66.01 MORBID OBESITY (HCC): ICD-10-CM

## 2022-12-06 PROBLEM — Z98.84 S/P GASTRIC BYPASS: Status: ACTIVE | Noted: 2022-12-06

## 2022-12-06 PROCEDURE — 2580000003 HC RX 258: Performed by: SURGERY

## 2022-12-06 PROCEDURE — 7100000000 HC PACU RECOVERY - FIRST 15 MIN: Performed by: SURGERY

## 2022-12-06 PROCEDURE — 7100000001 HC PACU RECOVERY - ADDTL 15 MIN: Performed by: SURGERY

## 2022-12-06 PROCEDURE — 1200000000 HC SEMI PRIVATE

## 2022-12-06 PROCEDURE — 2720000010 HC SURG SUPPLY STERILE: Performed by: SURGERY

## 2022-12-06 PROCEDURE — 6370000000 HC RX 637 (ALT 250 FOR IP): Performed by: SURGERY

## 2022-12-06 PROCEDURE — 2500000003 HC RX 250 WO HCPCS: Performed by: NURSE ANESTHETIST, CERTIFIED REGISTERED

## 2022-12-06 PROCEDURE — 8E0W4CZ ROBOTIC ASSISTED PROCEDURE OF TRUNK REGION, PERCUTANEOUS ENDOSCOPIC APPROACH: ICD-10-PCS | Performed by: SURGERY

## 2022-12-06 PROCEDURE — 3600000009 HC SURGERY ROBOT BASE: Performed by: SURGERY

## 2022-12-06 PROCEDURE — 2580000003 HC RX 258: Performed by: NURSE ANESTHETIST, CERTIFIED REGISTERED

## 2022-12-06 PROCEDURE — 3700000000 HC ANESTHESIA ATTENDED CARE: Performed by: SURGERY

## 2022-12-06 PROCEDURE — 0D164ZA BYPASS STOMACH TO JEJUNUM, PERCUTANEOUS ENDOSCOPIC APPROACH: ICD-10-PCS | Performed by: SURGERY

## 2022-12-06 PROCEDURE — 0DJ08ZZ INSPECTION OF UPPER INTESTINAL TRACT, VIA NATURAL OR ARTIFICIAL OPENING ENDOSCOPIC: ICD-10-PCS | Performed by: SURGERY

## 2022-12-06 PROCEDURE — 3700000001 HC ADD 15 MINUTES (ANESTHESIA): Performed by: SURGERY

## 2022-12-06 PROCEDURE — S2900 ROBOTIC SURGICAL SYSTEM: HCPCS | Performed by: SURGERY

## 2022-12-06 PROCEDURE — 6360000002 HC RX W HCPCS: Performed by: SURGERY

## 2022-12-06 PROCEDURE — 6360000002 HC RX W HCPCS

## 2022-12-06 PROCEDURE — 2500000003 HC RX 250 WO HCPCS: Performed by: SURGERY

## 2022-12-06 PROCEDURE — 2700000000 HC OXYGEN THERAPY PER DAY

## 2022-12-06 PROCEDURE — 6360000002 HC RX W HCPCS: Performed by: NURSE ANESTHETIST, CERTIFIED REGISTERED

## 2022-12-06 PROCEDURE — 0DNU4ZZ RELEASE OMENTUM, PERCUTANEOUS ENDOSCOPIC APPROACH: ICD-10-PCS | Performed by: SURGERY

## 2022-12-06 PROCEDURE — 2709999900 HC NON-CHARGEABLE SUPPLY: Performed by: SURGERY

## 2022-12-06 PROCEDURE — C9113 INJ PANTOPRAZOLE SODIUM, VIA: HCPCS | Performed by: SURGERY

## 2022-12-06 PROCEDURE — 0DN84ZZ RELEASE SMALL INTESTINE, PERCUTANEOUS ENDOSCOPIC APPROACH: ICD-10-PCS | Performed by: SURGERY

## 2022-12-06 PROCEDURE — 3600000019 HC SURGERY ROBOT ADDTL 15MIN: Performed by: SURGERY

## 2022-12-06 PROCEDURE — 0WUF47Z SUPPLEMENT ABDOMINAL WALL WITH AUTOLOGOUS TISSUE SUBSTITUTE, PERCUTANEOUS ENDOSCOPIC APPROACH: ICD-10-PCS | Performed by: SURGERY

## 2022-12-06 RX ORDER — LABETALOL HYDROCHLORIDE 5 MG/ML
10 INJECTION, SOLUTION INTRAVENOUS
Status: DISCONTINUED | OUTPATIENT
Start: 2022-12-06 | End: 2022-12-06 | Stop reason: HOSPADM

## 2022-12-06 RX ORDER — SCOLOPAMINE TRANSDERMAL SYSTEM 1 MG/1
1 PATCH, EXTENDED RELEASE TRANSDERMAL
Status: DISCONTINUED | OUTPATIENT
Start: 2022-12-06 | End: 2022-12-07 | Stop reason: HOSPADM

## 2022-12-06 RX ORDER — MIDAZOLAM HYDROCHLORIDE 1 MG/ML
INJECTION INTRAMUSCULAR; INTRAVENOUS PRN
Status: DISCONTINUED | OUTPATIENT
Start: 2022-12-06 | End: 2022-12-06 | Stop reason: SDUPTHER

## 2022-12-06 RX ORDER — KETOROLAC TROMETHAMINE 30 MG/ML
30 INJECTION, SOLUTION INTRAMUSCULAR; INTRAVENOUS ONCE
Status: DISCONTINUED | OUTPATIENT
Start: 2022-12-06 | End: 2022-12-06 | Stop reason: HOSPADM

## 2022-12-06 RX ORDER — POTASSIUM CHLORIDE 7.45 MG/ML
10 INJECTION INTRAVENOUS PRN
Status: DISCONTINUED | OUTPATIENT
Start: 2022-12-06 | End: 2022-12-07 | Stop reason: HOSPADM

## 2022-12-06 RX ORDER — IPRATROPIUM BROMIDE AND ALBUTEROL SULFATE 2.5; .5 MG/3ML; MG/3ML
1 SOLUTION RESPIRATORY (INHALATION)
Status: DISCONTINUED | OUTPATIENT
Start: 2022-12-06 | End: 2022-12-06 | Stop reason: HOSPADM

## 2022-12-06 RX ORDER — SODIUM CHLORIDE 0.9 % (FLUSH) 0.9 %
5-40 SYRINGE (ML) INJECTION EVERY 12 HOURS SCHEDULED
Status: DISCONTINUED | OUTPATIENT
Start: 2022-12-06 | End: 2022-12-06 | Stop reason: HOSPADM

## 2022-12-06 RX ORDER — PROCHLORPERAZINE EDISYLATE 5 MG/ML
5 INJECTION INTRAMUSCULAR; INTRAVENOUS
Status: DISCONTINUED | OUTPATIENT
Start: 2022-12-06 | End: 2022-12-06 | Stop reason: HOSPADM

## 2022-12-06 RX ORDER — SODIUM CHLORIDE 0.9 % (FLUSH) 0.9 %
5-40 SYRINGE (ML) INJECTION PRN
Status: DISCONTINUED | OUTPATIENT
Start: 2022-12-06 | End: 2022-12-07 | Stop reason: HOSPADM

## 2022-12-06 RX ORDER — TOPIRAMATE 100 MG/1
50 TABLET, FILM COATED ORAL 2 TIMES DAILY
Status: DISCONTINUED | OUTPATIENT
Start: 2022-12-07 | End: 2022-12-07 | Stop reason: HOSPADM

## 2022-12-06 RX ORDER — MORPHINE SULFATE 10 MG/ML
INJECTION, SOLUTION INTRAMUSCULAR; INTRAVENOUS PRN
Status: DISCONTINUED | OUTPATIENT
Start: 2022-12-06 | End: 2022-12-06 | Stop reason: SDUPTHER

## 2022-12-06 RX ORDER — KETOROLAC TROMETHAMINE 30 MG/ML
30 INJECTION, SOLUTION INTRAMUSCULAR; INTRAVENOUS EVERY 6 HOURS
Status: DISCONTINUED | OUTPATIENT
Start: 2022-12-06 | End: 2022-12-07 | Stop reason: HOSPADM

## 2022-12-06 RX ORDER — MORPHINE SULFATE 2 MG/ML
2 INJECTION, SOLUTION INTRAMUSCULAR; INTRAVENOUS
Status: DISCONTINUED | OUTPATIENT
Start: 2022-12-06 | End: 2022-12-07 | Stop reason: HOSPADM

## 2022-12-06 RX ORDER — HALOPERIDOL 5 MG/ML
1 INJECTION INTRAMUSCULAR
Status: DISCONTINUED | OUTPATIENT
Start: 2022-12-06 | End: 2022-12-06 | Stop reason: HOSPADM

## 2022-12-06 RX ORDER — SODIUM CHLORIDE 9 MG/ML
INJECTION, SOLUTION INTRAVENOUS PRN
Status: DISCONTINUED | OUTPATIENT
Start: 2022-12-06 | End: 2022-12-06 | Stop reason: HOSPADM

## 2022-12-06 RX ORDER — DIPHENHYDRAMINE HYDROCHLORIDE 50 MG/ML
25 INJECTION INTRAMUSCULAR; INTRAVENOUS EVERY 6 HOURS PRN
Status: DISCONTINUED | OUTPATIENT
Start: 2022-12-06 | End: 2022-12-07 | Stop reason: HOSPADM

## 2022-12-06 RX ORDER — ROCURONIUM BROMIDE 10 MG/ML
INJECTION, SOLUTION INTRAVENOUS PRN
Status: DISCONTINUED | OUTPATIENT
Start: 2022-12-06 | End: 2022-12-06 | Stop reason: SDUPTHER

## 2022-12-06 RX ORDER — SODIUM CHLORIDE 0.9 % (FLUSH) 0.9 %
5-40 SYRINGE (ML) INJECTION PRN
Status: DISCONTINUED | OUTPATIENT
Start: 2022-12-06 | End: 2022-12-06 | Stop reason: HOSPADM

## 2022-12-06 RX ORDER — ONDANSETRON 2 MG/ML
INJECTION INTRAMUSCULAR; INTRAVENOUS PRN
Status: DISCONTINUED | OUTPATIENT
Start: 2022-12-06 | End: 2022-12-06 | Stop reason: SDUPTHER

## 2022-12-06 RX ORDER — SODIUM CHLORIDE 9 MG/ML
INJECTION, SOLUTION INTRAVENOUS CONTINUOUS PRN
Status: DISCONTINUED | OUTPATIENT
Start: 2022-12-06 | End: 2022-12-06 | Stop reason: SDUPTHER

## 2022-12-06 RX ORDER — PROCHLORPERAZINE EDISYLATE 5 MG/ML
10 INJECTION INTRAMUSCULAR; INTRAVENOUS EVERY 6 HOURS PRN
Status: DISCONTINUED | OUTPATIENT
Start: 2022-12-06 | End: 2022-12-07 | Stop reason: HOSPADM

## 2022-12-06 RX ORDER — ONDANSETRON 2 MG/ML
4 INJECTION INTRAMUSCULAR; INTRAVENOUS EVERY 6 HOURS PRN
Status: DISCONTINUED | OUTPATIENT
Start: 2022-12-06 | End: 2022-12-07 | Stop reason: HOSPADM

## 2022-12-06 RX ORDER — MAGNESIUM SULFATE 1 G/100ML
1000 INJECTION INTRAVENOUS PRN
Status: DISCONTINUED | OUTPATIENT
Start: 2022-12-06 | End: 2022-12-07 | Stop reason: HOSPADM

## 2022-12-06 RX ORDER — DIPHENHYDRAMINE HCL 25 MG
25 TABLET ORAL EVERY 6 HOURS PRN
Status: DISCONTINUED | OUTPATIENT
Start: 2022-12-06 | End: 2022-12-07 | Stop reason: HOSPADM

## 2022-12-06 RX ORDER — DIPHENHYDRAMINE HYDROCHLORIDE 50 MG/ML
INJECTION INTRAMUSCULAR; INTRAVENOUS PRN
Status: DISCONTINUED | OUTPATIENT
Start: 2022-12-06 | End: 2022-12-06 | Stop reason: SDUPTHER

## 2022-12-06 RX ORDER — DIPHENHYDRAMINE HYDROCHLORIDE 50 MG/ML
12.5 INJECTION INTRAMUSCULAR; INTRAVENOUS
Status: DISCONTINUED | OUTPATIENT
Start: 2022-12-06 | End: 2022-12-06 | Stop reason: HOSPADM

## 2022-12-06 RX ORDER — HYDRALAZINE HYDROCHLORIDE 20 MG/ML
10 INJECTION INTRAMUSCULAR; INTRAVENOUS
Status: DISCONTINUED | OUTPATIENT
Start: 2022-12-06 | End: 2022-12-06 | Stop reason: HOSPADM

## 2022-12-06 RX ORDER — PROPOFOL 10 MG/ML
INJECTION, EMULSION INTRAVENOUS PRN
Status: DISCONTINUED | OUTPATIENT
Start: 2022-12-06 | End: 2022-12-06 | Stop reason: SDUPTHER

## 2022-12-06 RX ORDER — SODIUM CHLORIDE 9 MG/ML
INJECTION, SOLUTION INTRAVENOUS PRN
Status: DISCONTINUED | OUTPATIENT
Start: 2022-12-06 | End: 2022-12-07 | Stop reason: HOSPADM

## 2022-12-06 RX ORDER — POTASSIUM CHLORIDE 20 MEQ/1
40 TABLET, EXTENDED RELEASE ORAL PRN
Status: DISCONTINUED | OUTPATIENT
Start: 2022-12-06 | End: 2022-12-07 | Stop reason: HOSPADM

## 2022-12-06 RX ORDER — OXYCODONE HCL 5 MG/5 ML
5 SOLUTION, ORAL ORAL EVERY 4 HOURS PRN
Status: DISCONTINUED | OUTPATIENT
Start: 2022-12-06 | End: 2022-12-06 | Stop reason: CLARIF

## 2022-12-06 RX ORDER — MEPERIDINE HYDROCHLORIDE 25 MG/ML
12.5 INJECTION INTRAMUSCULAR; INTRAVENOUS; SUBCUTANEOUS EVERY 5 MIN PRN
Status: DISCONTINUED | OUTPATIENT
Start: 2022-12-06 | End: 2022-12-06 | Stop reason: HOSPADM

## 2022-12-06 RX ORDER — OXYCODONE HCL 20 MG/ML
5 CONCENTRATE, ORAL ORAL EVERY 4 HOURS PRN
Status: DISCONTINUED | OUTPATIENT
Start: 2022-12-06 | End: 2022-12-07 | Stop reason: HOSPADM

## 2022-12-06 RX ORDER — ACETAMINOPHEN 500 MG
1000 TABLET ORAL ONCE
Status: COMPLETED | OUTPATIENT
Start: 2022-12-06 | End: 2022-12-06

## 2022-12-06 RX ORDER — ENOXAPARIN SODIUM 100 MG/ML
30 INJECTION SUBCUTANEOUS 2 TIMES DAILY
Status: DISCONTINUED | OUTPATIENT
Start: 2022-12-07 | End: 2022-12-07 | Stop reason: HOSPADM

## 2022-12-06 RX ORDER — KETOROLAC TROMETHAMINE 30 MG/ML
INJECTION, SOLUTION INTRAMUSCULAR; INTRAVENOUS PRN
Status: DISCONTINUED | OUTPATIENT
Start: 2022-12-06 | End: 2022-12-06 | Stop reason: SDUPTHER

## 2022-12-06 RX ORDER — LIDOCAINE HYDROCHLORIDE 20 MG/ML
INJECTION, SOLUTION INTRAVENOUS PRN
Status: DISCONTINUED | OUTPATIENT
Start: 2022-12-06 | End: 2022-12-06 | Stop reason: SDUPTHER

## 2022-12-06 RX ORDER — SUCCINYLCHOLINE/SOD CL,ISO/PF 200MG/10ML
SYRINGE (ML) INTRAVENOUS PRN
Status: DISCONTINUED | OUTPATIENT
Start: 2022-12-06 | End: 2022-12-06 | Stop reason: SDUPTHER

## 2022-12-06 RX ORDER — DEXAMETHASONE SODIUM PHOSPHATE 10 MG/ML
INJECTION, SOLUTION INTRAMUSCULAR; INTRAVENOUS PRN
Status: DISCONTINUED | OUTPATIENT
Start: 2022-12-06 | End: 2022-12-06 | Stop reason: SDUPTHER

## 2022-12-06 RX ORDER — FENTANYL CITRATE 50 UG/ML
INJECTION, SOLUTION INTRAMUSCULAR; INTRAVENOUS PRN
Status: DISCONTINUED | OUTPATIENT
Start: 2022-12-06 | End: 2022-12-06 | Stop reason: SDUPTHER

## 2022-12-06 RX ORDER — BUPIVACAINE HYDROCHLORIDE AND EPINEPHRINE 2.5; 5 MG/ML; UG/ML
INJECTION, SOLUTION EPIDURAL; INFILTRATION; INTRACAUDAL; PERINEURAL PRN
Status: DISCONTINUED | OUTPATIENT
Start: 2022-12-06 | End: 2022-12-06 | Stop reason: ALTCHOICE

## 2022-12-06 RX ORDER — PROMETHAZINE HYDROCHLORIDE 25 MG/1
25 SUPPOSITORY RECTAL EVERY 6 HOURS PRN
Status: DISCONTINUED | OUTPATIENT
Start: 2022-12-06 | End: 2022-12-07 | Stop reason: HOSPADM

## 2022-12-06 RX ORDER — MIDAZOLAM HYDROCHLORIDE 1 MG/ML
2 INJECTION INTRAMUSCULAR; INTRAVENOUS
Status: DISCONTINUED | OUTPATIENT
Start: 2022-12-06 | End: 2022-12-06 | Stop reason: HOSPADM

## 2022-12-06 RX ORDER — GLYCOPYRROLATE 0.2 MG/ML
INJECTION INTRAMUSCULAR; INTRAVENOUS PRN
Status: DISCONTINUED | OUTPATIENT
Start: 2022-12-06 | End: 2022-12-06 | Stop reason: SDUPTHER

## 2022-12-06 RX ORDER — SODIUM CHLORIDE 9 MG/ML
INJECTION, SOLUTION INTRAVENOUS CONTINUOUS
Status: DISCONTINUED | OUTPATIENT
Start: 2022-12-06 | End: 2022-12-06 | Stop reason: HOSPADM

## 2022-12-06 RX ORDER — SCOLOPAMINE TRANSDERMAL SYSTEM 1 MG/1
1 PATCH, EXTENDED RELEASE TRANSDERMAL
Status: DISCONTINUED | OUTPATIENT
Start: 2022-12-09 | End: 2022-12-07 | Stop reason: HOSPADM

## 2022-12-06 RX ORDER — ACETAMINOPHEN 160 MG/5ML
650 SUSPENSION, ORAL (FINAL DOSE FORM) ORAL EVERY 6 HOURS
Status: DISCONTINUED | OUTPATIENT
Start: 2022-12-06 | End: 2022-12-07 | Stop reason: HOSPADM

## 2022-12-06 RX ORDER — ONDANSETRON 2 MG/ML
4 INJECTION INTRAMUSCULAR; INTRAVENOUS ONCE
Status: COMPLETED | OUTPATIENT
Start: 2022-12-06 | End: 2022-12-06

## 2022-12-06 RX ORDER — SODIUM CHLORIDE 0.9 % (FLUSH) 0.9 %
5-40 SYRINGE (ML) INJECTION EVERY 12 HOURS SCHEDULED
Status: DISCONTINUED | OUTPATIENT
Start: 2022-12-06 | End: 2022-12-07 | Stop reason: HOSPADM

## 2022-12-06 RX ORDER — SODIUM CHLORIDE, SODIUM LACTATE, POTASSIUM CHLORIDE, CALCIUM CHLORIDE 600; 310; 30; 20 MG/100ML; MG/100ML; MG/100ML; MG/100ML
INJECTION, SOLUTION INTRAVENOUS CONTINUOUS
Status: DISCONTINUED | OUTPATIENT
Start: 2022-12-06 | End: 2022-12-07 | Stop reason: HOSPADM

## 2022-12-06 RX ORDER — NEOSTIGMINE METHYLSULFATE 1 MG/ML
INJECTION, SOLUTION INTRAVENOUS PRN
Status: DISCONTINUED | OUTPATIENT
Start: 2022-12-06 | End: 2022-12-06 | Stop reason: SDUPTHER

## 2022-12-06 RX ADMIN — ROCURONIUM BROMIDE 10 MG: 10 INJECTION, SOLUTION INTRAVENOUS at 07:36

## 2022-12-06 RX ADMIN — SODIUM CHLORIDE, POTASSIUM CHLORIDE, SODIUM LACTATE AND CALCIUM CHLORIDE: 600; 310; 30; 20 INJECTION, SOLUTION INTRAVENOUS at 19:47

## 2022-12-06 RX ADMIN — ONDANSETRON 4 MG: 2 INJECTION INTRAMUSCULAR; INTRAVENOUS at 09:14

## 2022-12-06 RX ADMIN — MORPHINE SULFATE 10 MG: 10 INJECTION INTRAMUSCULAR; INTRAVENOUS; SUBCUTANEOUS at 09:23

## 2022-12-06 RX ADMIN — ROCURONIUM BROMIDE 40 MG: 10 INJECTION, SOLUTION INTRAVENOUS at 07:43

## 2022-12-06 RX ADMIN — FENTANYL CITRATE 50 MCG: 50 INJECTION, SOLUTION INTRAMUSCULAR; INTRAVENOUS at 07:55

## 2022-12-06 RX ADMIN — OXYCODONE HYDROCHLORIDE 5 MG: 100 SOLUTION ORAL at 17:00

## 2022-12-06 RX ADMIN — SODIUM CHLORIDE, PRESERVATIVE FREE 40 MG: 5 INJECTION INTRAVENOUS at 13:00

## 2022-12-06 RX ADMIN — DEXAMETHASONE SODIUM PHOSPHATE 10 MG: 10 INJECTION INTRAMUSCULAR; INTRAVENOUS at 07:45

## 2022-12-06 RX ADMIN — FENTANYL CITRATE 50 MCG: 50 INJECTION, SOLUTION INTRAMUSCULAR; INTRAVENOUS at 07:49

## 2022-12-06 RX ADMIN — Medication 0.5 MG: at 10:15

## 2022-12-06 RX ADMIN — PROPOFOL 200 MG: 10 INJECTION, EMULSION INTRAVENOUS at 07:36

## 2022-12-06 RX ADMIN — ONDANSETRON 4 MG: 2 INJECTION INTRAMUSCULAR; INTRAVENOUS at 06:49

## 2022-12-06 RX ADMIN — MIDAZOLAM 2 MG: 1 INJECTION INTRAMUSCULAR; INTRAVENOUS at 07:28

## 2022-12-06 RX ADMIN — MORPHINE SULFATE 2 MG: 2 INJECTION, SOLUTION INTRAMUSCULAR; INTRAVENOUS at 13:00

## 2022-12-06 RX ADMIN — SODIUM CHLORIDE: 900 INJECTION, SOLUTION INTRAVENOUS at 07:24

## 2022-12-06 RX ADMIN — GLYCOPYRROLATE 0.4 MG: 0.2 INJECTION, SOLUTION INTRAMUSCULAR; INTRAVENOUS at 09:19

## 2022-12-06 RX ADMIN — HYDROMORPHONE HYDROCHLORIDE 0.5 MG: 1 INJECTION, SOLUTION INTRAMUSCULAR; INTRAVENOUS; SUBCUTANEOUS at 10:15

## 2022-12-06 RX ADMIN — ACETAMINOPHEN 1000 MG: 500 TABLET ORAL at 06:48

## 2022-12-06 RX ADMIN — ONDANSETRON 4 MG: 2 INJECTION INTRAMUSCULAR; INTRAVENOUS at 21:13

## 2022-12-06 RX ADMIN — ACETAMINOPHEN 650 MG: 160 SUSPENSION ORAL at 13:01

## 2022-12-06 RX ADMIN — ACETAMINOPHEN 650 MG: 160 SUSPENSION ORAL at 19:44

## 2022-12-06 RX ADMIN — SODIUM CHLORIDE, POTASSIUM CHLORIDE, SODIUM LACTATE AND CALCIUM CHLORIDE: 600; 310; 30; 20 INJECTION, SOLUTION INTRAVENOUS at 13:00

## 2022-12-06 RX ADMIN — KETOROLAC TROMETHAMINE 30 MG: 30 INJECTION, SOLUTION INTRAMUSCULAR at 21:14

## 2022-12-06 RX ADMIN — MORPHINE SULFATE 2 MG: 2 INJECTION, SOLUTION INTRAMUSCULAR; INTRAVENOUS at 22:33

## 2022-12-06 RX ADMIN — FENTANYL CITRATE 150 MCG: 50 INJECTION, SOLUTION INTRAMUSCULAR; INTRAVENOUS at 07:36

## 2022-12-06 RX ADMIN — KETOROLAC TROMETHAMINE 30 MG: 30 INJECTION, SOLUTION INTRAMUSCULAR; INTRAVENOUS at 09:16

## 2022-12-06 RX ADMIN — FENTANYL CITRATE 50 MCG: 50 INJECTION, SOLUTION INTRAMUSCULAR; INTRAVENOUS at 09:27

## 2022-12-06 RX ADMIN — Medication 160 MG: at 07:36

## 2022-12-06 RX ADMIN — LIDOCAINE HYDROCHLORIDE 100 MG: 20 INJECTION, SOLUTION INTRAVENOUS at 07:36

## 2022-12-06 RX ADMIN — HYDROMORPHONE HYDROCHLORIDE 0.5 MG: 1 INJECTION, SOLUTION INTRAMUSCULAR; INTRAVENOUS; SUBCUTANEOUS at 11:30

## 2022-12-06 RX ADMIN — Medication 0.5 MG: at 11:30

## 2022-12-06 RX ADMIN — KETOROLAC TROMETHAMINE 30 MG: 30 INJECTION, SOLUTION INTRAMUSCULAR at 15:18

## 2022-12-06 RX ADMIN — Medication 3 MG: at 09:19

## 2022-12-06 RX ADMIN — CEFOXITIN 2000 MG: 2 INJECTION, POWDER, FOR SOLUTION INTRAVENOUS at 07:28

## 2022-12-06 RX ADMIN — SODIUM CHLORIDE: 9 INJECTION, SOLUTION INTRAVENOUS at 06:52

## 2022-12-06 RX ADMIN — DIPHENHYDRAMINE HYDROCHLORIDE 12.5 MG: 50 INJECTION, SOLUTION INTRAMUSCULAR; INTRAVENOUS at 09:28

## 2022-12-06 ASSESSMENT — PAIN DESCRIPTION - PAIN TYPE
TYPE: SURGICAL PAIN

## 2022-12-06 ASSESSMENT — PAIN DESCRIPTION - FREQUENCY
FREQUENCY: CONTINUOUS
FREQUENCY: CONTINUOUS
FREQUENCY: INTERMITTENT

## 2022-12-06 ASSESSMENT — PAIN DESCRIPTION - DESCRIPTORS
DESCRIPTORS: SHARP
DESCRIPTORS: SORE
DESCRIPTORS: SORE
DESCRIPTORS: TENDER

## 2022-12-06 ASSESSMENT — PAIN SCALES - GENERAL
PAINLEVEL_OUTOF10: 7
PAINLEVEL_OUTOF10: 7
PAINLEVEL_OUTOF10: 0
PAINLEVEL_OUTOF10: 3
PAINLEVEL_OUTOF10: 3
PAINLEVEL_OUTOF10: 5
PAINLEVEL_OUTOF10: 8
PAINLEVEL_OUTOF10: 7
PAINLEVEL_OUTOF10: 6
PAINLEVEL_OUTOF10: 7
PAINLEVEL_OUTOF10: 3
PAINLEVEL_OUTOF10: 3
PAINLEVEL_OUTOF10: 9

## 2022-12-06 ASSESSMENT — PAIN - FUNCTIONAL ASSESSMENT
PAIN_FUNCTIONAL_ASSESSMENT: ACTIVITIES ARE NOT PREVENTED
PAIN_FUNCTIONAL_ASSESSMENT: ACTIVITIES ARE NOT PREVENTED
PAIN_FUNCTIONAL_ASSESSMENT: NONE - DENIES PAIN

## 2022-12-06 ASSESSMENT — PAIN DESCRIPTION - LOCATION
LOCATION: ABDOMEN

## 2022-12-06 ASSESSMENT — PAIN DESCRIPTION - ONSET
ONSET: ON-GOING
ONSET: ON-GOING

## 2022-12-06 ASSESSMENT — PAIN DESCRIPTION - ORIENTATION
ORIENTATION: MID
ORIENTATION: MID

## 2022-12-06 NOTE — OP NOTE
[facility]                Surgical Weight Loss Center    Operative Report  Lyly Mullen MD, MS    DATE OF PROCEDURE: 12/6/2022    SURGEON: Dr. Diana Silver MD, M.D.     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     ESTIMATED BLOOD LOSS: 10 mL. COMPLICATIONS: None. HISTORY: Westley Kim is a morbidly-obese 49 y.o.  female, who weighs 303 pounds. The There is no height or weight on file to calculate BMI. She has multiple medical problems aggravated by her obesity and a hiatal hernia causing reflux. She wishes to have a gastric bypass so that she can lose more weight and keep the weight off and potentially have the hiatal hernia repaired to help decrease the reflux problems. She understands the extensive risks involved in the surgery and wishes to proceed. PROCEDURE: The patient was placed on the table in the supine position and placed under general endotracheal anesthesia. They had SCDs on the legs as DVT prophylaxis. They had Ancef IV. Orogastric tube placed in the stomach, then removed. The abdomen was clipped, then prepped with DuraPrep and draped in the usual sterile fashion. Then, 0.25% Marcaine plus epinephrine was injected into the skin and muscle of each incision to help with postoperative pain control. An 8mm incision was made above the umbilicus and veress needle inserted and confirmed its placement.  The abdomen was insufflated to 15mmHg, the needle removed and 8mm trocar inserted. The camera was inserted and the abdomen inspected. There were extensive adhesions surrounding her previous abdominal wall mesh that had been placed. A 12 mm trocar was placed in the right mid abdomen, an 8-mm trocar in the left midabdomen, 8-mm trocar in the right lateral subcostal region. The head of the bed was elevated. [place] liver retractor was placed below the xiphoid. The liver was large and fatty. The liver was retracted methodically. We then undertook extensive lysis of adhesions using cautery as well as the LigaSure device. I had inserted 2 more 5 mm trochars 1 in the left lower quadrant on the left middle abdomen in order to achieve lyse of adhesions safely. We took down all the omental and small bowel attachments from the mesh that was adhered to the midline. It was taken down and then hemostatically controlled using the LigaSure and cautery. There was no injury to the bowel after completion of lysis of adhesions. The robot was then docked over the left side. Pars flaccida clear area was opened with synchroseal. The hiatus was inspected and no hiatal hernia was appreciated. The lesser omental vessels were taken down to the lesser curvature of the stomach. The Robotic 60mm blue cartridge was fired horizontally across the stomach to start the pouch. Three more firings vertically out through an opening at the angle of HIS. Pouch size approximately 30 mL. The ligament of Treitz was identified and measured 80 cm and brought up to the stomach pouch in omega loop fashion. The jejunum was sewn to the stomach with a V lock absorbable suture along the back wall of the anastomosis. Hook cautery was used to make a small hole in the anterior wall at the distal gastric staple line and a enterotomy was made in the small bowel opposite to the gastrostomy also with hook cautery.  We then insterted a 39 F bougie was placed by Anaesthesia through the anastomosis after the stomach and jejunum were opened with hook catery. The anastamosis was then completed with a second absorbable barbed suture. Vessel sealer was used to make a small opening in the mesentery of the biliary limb and it was then divided with the Robotic white cartridge lateral to the gastric anastomosis. The Bougie was removed. Hook cautery was used to make an enterotomy 5cm from the staple line in the biliary limb. The small bowel was then run 150 cm from the gastrojejunal anastomosis and a loop of jejunum was brought up for the distal anastomosis using the triple-staple technique. Hook cautery was used to make a small opening in common limb. The Robotic 60 white cartridge was fired, inside the lumen in opposite directions. The enterotomy was closed transversely with a Robotic 60 blue cartridge, finishing the distal anastomosis. The mesenteric defect was closed with a running absorbable barbed suture to prevent internal hernias. The abdomen was filled with saline. A bowel clamp was placed on the jejunum distal to the gastrojejunal anastomosis. An Endoscope was passed down through the mouth. The scope fit past the hiatus without problems and again no hiatal hernia was appreciated. The scope was pushed into the pouch. Old blood was removed with suction. The pouch was inflated with air. There was no bubbling from any of the staple lines indicating they were airtight and watertight. The anastomosis was open approximately 9 mm. The scope was easily passed through the anastomosis into the jejunum. All of the mucosa looked healthy. The scope was withdrawn. Omentum was mobilized from the greater curvature and the transverse colon to reach the gastrojejunal anastomosis. The anastomosis was wrapped with omentum and tacked with a v lock suture. The omentum was mobilized to preserve blood supply and wrapped 360 degrees around the anastomosis to buttress the closure.  All of

## 2022-12-06 NOTE — DISCHARGE INSTRUCTIONS
Dr. Diana Silver MD, MS  Discharge Instructions for Bariatric Surgery  Chantel-en-Y gastric bypass OR Sleeve Gastrectomy       HOME CARE   Keep the incision area clean and dry. The glue on the incision is waterproof so you can shower. Do not remove the surgical glue. Leave the incisions open to air. Only cover if drainage occurs. Place ice on painful incision for 1-2 days. Make sure you know how to use and continue to use your incentive spirometer every hour while awake at home. DIET   Drink frequently and always carry fluids with you while awake to maintain hydration. Follow the diet instructions that you received in your Nutrition Education Manual for the dates and times of when to start your Bariatric Clear Liquids and Bariatric Full Liquid Diet along with the protein supplements. DO NOT take the Bariatric Multivitamins, Iron, Calcium, Vitamin D or Vitamin b12 supplements until instructed to do so at your 2 week follow up appointment with your surgeon. Slow down intake of liquids if there is chest pressure, acid or fullness. Drinking too fast or too much at one time can result in pain and vomiting. You may notice increased gas or changes in your bowel habits during the first month after surgery. Keep the bowels soft with stool softeners at first, then switch to Metamucil. If you are not able to drink 64 ounces of fluid a day, you will develop constipation. Keep the bowels moving daily. PHYSICAL ACTIVITY   Walk frequently and keep your legs elevated when sitting to prevent blood clots in the legs. Discomfort at the incisions is normal with increasing activity. Severe pain at the incisions is not expected and you should avoid activities that result in severe pain. Increase your activity gradually, walking at least 10 minutes every hour you are awake. Do not drive while taking narcotic pain medication as this can cause drowsiness. You must be narcotic free for at least 24 hours before driving.  Do not bend, twist, pull, or lift over 20 pounds for the first 2 weeks after surgery. Then for the next 2 weeks after that, do not bend, twist, pull, or lift over 50 pounds. If anything causes pain or discomfort, stop! Breathe deeply every hour to prevent pneumonia. Continue to use your incentive spirometer at home as this will help to prevent pneumonia as well. Medications   Restart blood thinners in 24 hours if no signs of bleeding. Take Tylenol for pain. Take Colace 100 mg twice a day. Take Ondansetron (Zofran), Omeprazole (Prilosec), or Sucralfate (Carafate) as prescribed. Follow up with Primary Care Physician (PCP)regarding home medications you were taking prior to surgery or with the prescribing physician if not your PCP. Extended-release medications are not recommended. Your PCP should convert to another medication if possible. DO NOT STOP ANY PRESCRIBED MEDICATION WITHOUT TALKING TO YOUR DOCTOR. If diabetic, check blood sugars as ordered by PCP or Endocrinologist. Follow up with PCP or Endocrinologist regarding diabetic medications. Make sure you take any medicines you were on for depression or anxiety. FOLLOW-UP   Follow-up appointment with surgeon 10-14 days after surgery. Complete lab work prior to this appointment. Follow-up with PCP and/or Endocrinologist prior to seeing surgeon. CALL [facility] WEIGHT LOSS OFFICE [phone] IF ANY OF THE FOLLOWING OCCURS TO BE SEEN IN THE OFFICE:   Signs of infection, fever, chills, redness, swelling, increasing pain, or discharge at the incision site. Nausea not relieved by medication, frequent vomiting and not able to keep anything down, and  excessive diarrhea (more than 8 episodes in 24 hours). Pain, burning, urgency, or frequency of urination or blood in the urine. Pain and/or swelling in your feet, calves, or legs. You have pain that does not get better after taking pain medication   You cannot pass stool or gas.    You are sick to your stomach and cannot drink fluids. You have loose stitches, or your incision comes open. You have signs of a blood clot such as:  - pain in your calf, back of the knee, thigh, or groin. - redness and swelling in your leg or groin   If you feel dehydrated, call the office to have IV fluids ordered. Signs of dehydration include dizziness, tiredness, dry mouth, dry lips, decreased urine output, dark colored urine, headache, feeling thirsty and muscle cramps. This can occur if you are not meeting your daily fluid intake goal.   Watch closely for changes in your health, and be sure to contact your doctor if you have any problems    FOR ANY OF THE FOLLOWING, GO TO THE ER:   [facility]  [address]    IN CASE OF EMERGENCY, CALL 911 IMMEDIATELY    The following personal items were collected during your admission and were returned to you:    Belongings  Dental Appliances: None  Vision - Corrective Lenses: Eyeglasses  Hearing Aid: None  Clothing: Pants, Shirt  Jewelry: None  Body Piercings Removed: N/A  Electronic Devices: Cell Phone  Weapons (Notify Protective Services/Security): None  Other Valuables: Wallet  Home Medications: None  Valuables Given To: Family (Comment)  Provide Name(s) of Who Valuable(s) Were Given To: daughter  Responsible person(s) in the waiting room: donna  Patient approves for provider to speak to responsible person post operatively: Yes    Information obtained by:  By signing below, I understand that if any problems occur once I leave the hospital I am to contact Dr. An Nugent. I understand and acknowledge receipt of the instructions indicated above.

## 2022-12-06 NOTE — H&P
Kate Logan  12/6/2022  [id number]  [address] [facility]  Surgical Weight Loss Center [place] [facility]               History and Physical  Gastric Bypass     CHIEF COMPLAINT: Morbid obesity, GERD, Degenerative Joint Disease (DJD) and Extremity Edema    HISTORY OF PRESENT ILLNESS: Kate Logan is a morbidly-obese 49 y.o.  female, who weighs (!) 303 lb (137.4 kg). She is 148 pounds over her ideal body weight. The BMI is 50.42kg/m2. She has gained 2 pounds over the past several months in preparation for surgery. She has multiple medical problems aggravated by her obesity. She wishes to have a gastric bypass so that she can lose more weight and keep the weight off. I have met with her on 2 different occasions in the Surgical Weight Loss Clinic, where we discussed the surgery in great detail and went over the risks and benefits. She has watched our informational video so she understands all of the extensive risks involved. She states that she understands all of these risks and wishes to proceed. POSTOPERATIVE DIAGNOSES:   (1) No Hiatal Hernia, Effacement Hill Grade I  (2) No Esophagitis  (3) No Gastritis  (4) No Duodenitis    Allergies   Allergen Reactions    Vicodin [Hydrocodone-Acetaminophen] Hives       No current facility-administered medications on file prior to encounter. Current Outpatient Medications on File Prior to Encounter   Medication Sig Dispense Refill    Cholecalciferol (VITAMIN D3) 1.25 MG (33779 UT) CAPS take 1 capsule by mouth every week until finished      topiramate (TOPAMAX) 25 MG tablet Start Topiramate 25 mg. Take one tablet twice each day for one week. If the appetite suppression is insufficient, then increase to two tablets twice daily.  180 tablet 1    FIBER ADULT GUMMIES PO Take 1 tablet by mouth daily      Multiple Vitamin (MVI, CELEBRATE, CHEWABLE TABLET) Take 1 tablet by mouth daily 90 tablet 1       Past Medical History:   Diagnosis Date    Chronic pain of left knee Class 3 severe obesity due to excess calories without serious comorbidity with body mass index (BMI) of 50.0 to 59.9 in adult [facility])     Enlarged thyroid     Morbid obesity due to excess calories ([facility] [address]        Past Surgical History:   Procedure Laterality Date    ABDOMEN SURGERY      OVARIAN CYST REMOVAL, TUBAL LIGATION, C SECTION    HERNIA REPAIR      x 2    HYSTERECTOMY, TOTAL ABDOMINAL (CERVIX REMOVED)  5/6/2015    Dr. Annalise Tony N/A 1/21/2022    RIGHT THYROID LOBECTOMY performed by Seble Monroy MD at [address] 7/7/2022    EGD BIOPSY performed by Diana Silver MD at [place] [address]       Current Facility-Administered Medications   Medication Dose Route Frequency Provider Last Rate Last Admin    0.9 % sodium chloride infusion   IntraVENous Continuous Diana Silver  mL/hr at 12/06/22 0652 New Bag at 12/06/22 0652    sodium chloride flush 0.9 % injection 5-40 mL  5-40 mL IntraVENous 2 times per day Diana Silver MD        sodium chloride flush 0.9 % injection 5-40 mL  5-40 mL IntraVENous PRN Diana Silver MD        0.9 % sodium chloride infusion   IntraVENous PRN Diana Silver MD        scopolamine (TRANSDERM-SCOP) transdermal patch 1 patch  1 patch TransDERmal Q72H Diana Silver MD   1 patch at 12/06/22 0648    cefOXitin (MEFOXIN) 2,000 mg in sodium chloride 0.9 % 50 mL IVPB ([id number])  2,000 mg IntraVENous On Call to Fela Kennedy MD           Allergies   Allergen Reactions    Vicodin [Hydrocodone-Acetaminophen] Hives       Family History   Problem Relation Age of Onset    High Blood Pressure Father     Cancer Father     High Blood Pressure Sister     Asthma Sister     Heart Disease Brother     High Blood Pressure Brother     Asthma Brother        Social History     Socioeconomic History    Marital status: Single     Spouse name: Not on file    Number of children: Not on file    Years of education: Not on file    Highest education level: Not on file   Occupational History    Not on file   Tobacco Use    Smoking status: Former    Smokeless tobacco: Never    Tobacco comments:     quit 1 week ago   Vaping Use    Vaping Use: Never used   Substance and Sexual Activity    Alcohol use: Not Currently    Drug use: No    Sexual activity: Not on file   Other Topics Concern    Not on file   Social History Narrative    Not on file     Social Determinants of Health     Financial Resource Strain: Not on file   Food Insecurity: Not on file   Transportation Needs: Not on file   Physical Activity: Not on file   Stress: Not on file   Social Connections: Not on file   Intimate Partner Violence: Not on file   Housing Stability: Not on file       Review of Systems  General ROS: negative for - chills, fatigue or malaise  ENT ROS: negative for - hearing change, nasal congestion or nasal discharge  Allergy and Immunology ROS: negative for - hives, itchy/watery eyes or nasal congestion  Hematological and Lymphatic ROS: negative for - blood clots, blood transfusions, bruising or fatigue  Endocrine ROS: negative for - malaise/lethargy, mood swings, palpitations or polydipsia/polyuria  Respiratory ROS: negative for - sputum changes, stridor, tachypnea or wheezing  Cardiovascular ROS: negative for - irregular heartbeat, loss of consciousness, murmur or orthopnea  Gastrointestinal ROS: negative for - constipation, diarrhea, gas/bloating, heartburn or hematemesis  Genito-Urinary ROS: negative for -  genital discharge, genital ulcers or hematuria  Musculoskeletal ROS: negative for - gait disturbance, muscle pain or muscular weakness  Psychiatric ROS: negative for - visual or auditory hallucinations, suicidal ideation      Physical Exam:   Vitals: BP (!) 123/59   Pulse 70   Temp 98 °F (36.7 °C) (Infrared)   Resp 16   LMP 04/06/2015   SpO2 96%     General appearance: AAO, NAD  Eyes: PERRL, EOMI, red conjunctiva  Lungs: CTAB, no wheeze, no rhonchi  Chest wall: atraumatic, no tenderness, no echymosis or abrasions  Heart: reg rate, no murmur  Abdomen: soft, nondistended, nontender  Extremities: full ROM all 4 ext, no gross motor or sensory deficits  Pulses: 2+ distal  Skin: warm and dry  Neurologic: spontanous eye opening, purposeful, follows complex commands  Psych: No hallucinations      Assessment:  Morbid obesity with failure of conservative therapy. Patient has been cleared psychologically and medically. The gall bladder ultrasound showed fatty liver. Upper endoscopy showed no hiatal hernia. The patient was informed that risks include, but are not limited to: death, anastomotic leak, obstruction, bleeding, and sepsis. Any of these could require further surgery. Other risks include heart attack, DVT, PE, pneumonia, hernia, wound infection, the need for dilatations of the gastrojejunostomy, and the inability to lose appropriate weight and keep it off. We may not be able to do a Gastic Bypass, in that case we will do a Sleeve Gastrectomy. We discussed that our goal is to ameliorate the medical problems and not to obtain a specific body mass index. She understands the risks and benefits and wishes to proceed with the procedure. She has signed a consent form. Plan:  ([id number]) Laparoscopic Chantel-en-Y Gastric Bypass possible hiatal hernia repair. She does not need Lovenox post-op.     NEEDS TO LOSE 5lbs before surgery or significant risk of aborting due to hepatomegaly    Physician Signature: Electronically signed by Dr. Calixto Gibson MD      Send copy of H&P to PCP, Christen Macias DO

## 2022-12-06 NOTE — ANESTHESIA POSTPROCEDURE EVALUATION
Department of Anesthesiology  Postprocedure Note    Patient: Talha Tanner  MRN: [id number]  YOB: 1975  Date of evaluation: 12/6/2022      Procedure Summary     Date: 12/06/22 Room / Location: [address] [place] [address] 06 / [address]    Anesthesia Start: 4327 Anesthesia Stop: 8408    Procedure: GASTRIC BYPASS CALLIE-EN-Y LAPAROSCOPIC ROBOTIC XI Diagnosis:       Morbid obesity ([address]      (Morbid obesity ([facility] [address] [E66.01])    Surgeons: Austin iPnto MD Responsible Provider: Jihan Crum MD    Anesthesia Type: general ASA Status: 3          Anesthesia Type: No value filed.     Castillo Phase I: Castillo Score: 9    Castillo Phase II:        Anesthesia Post Evaluation    Patient location during evaluation: PACU  Patient participation: complete - patient participated  Level of consciousness: awake  Pain score: 3  Airway patency: patent  Nausea & Vomiting: no nausea and no vomiting  Complications: no  Cardiovascular status: hemodynamically stable  Respiratory status: acceptable  Hydration status: euvolemic

## 2022-12-06 NOTE — CONSULTS
Department of Internal Medicine  Internal Medicine Consultation Note    Primary Care Physician: Mode Weathers DO   Admitting Physician:  Jose Antonio Logan MD  Admission date and time: 12/6/2022  6:07 AM    Room:  OR POOL/NONE  Admitting diagnosis: Morbid obesity ([address] [E66.01]  S/P gastric bypass [Z98.84]    Patient Name: Karrie Madison  MRN: [id number]    Date of Service: 12/6/2022     Reason for consultation:  Medical management    HISTORY OF PRESENT ILLNESS:    Karrie Madison was seen and examined in the PACU. Dina Schultz is mildly drowsy under the recent administration of pain medication and anesthesia, otherwise symptoms are well controlled. No nausea or vomiting. Dina Schultz has not yet passed flatus or had bowel movement in the immediate postoperative phase. Dina Schultz was at baseline state of health prior to the procedure with no significant medical comorbidities reported. No recent fevers or chills, sick contacts or exposures. No headache or acute neurologic symptoms. No chest pains, palpitations, fluttering. No shortness of breath at rest or exertion, cough, phlegm production or hemoptysis. No abdominal symptoms prior to surgery and presently symptoms are well controlled as described above. No acute urinary symptoms reported. We have discussed increased activity and ambulation, oxygen weaning once allowed.     PAST MEDICAL Hx:  Past Medical History:   Diagnosis Date    Chronic pain of left knee     Class 3 severe obesity due to excess calories without serious comorbidity with body mass index (BMI) of 50.0 to 59.9 in adult [facility])     Enlarged thyroid     Morbid obesity due to excess calories ([facility] [address]        PAST SURGICAL Hx:   Past Surgical History:   Procedure Laterality Date    ABDOMEN SURGERY      OVARIAN CYST REMOVAL, TUBAL LIGATION, C SECTION    HERNIA REPAIR      x 2    HYSTERECTOMY, TOTAL ABDOMINAL (CERVIX REMOVED)  5/6/2015    Dr. Karen Barone N/A 1/21/2022    RIGHT THYROID LOBECTOMY performed by Faith Childers MD at [facility] N/A 7/7/2022    EGD BIOPSY performed by Maeve Sylvester MD at [facility] Hx:  Family History   Problem Relation Age of Onset    High Blood Pressure Father     Cancer Father     High Blood Pressure Sister     Asthma Sister     Heart Disease Brother     High Blood Pressure Brother     Asthma Brother        HOME MEDICATIONS:  Prior to Admission medications    Medication Sig Start Date End Date Taking? Authorizing Provider   omeprazole (PRILOSEC) 20 MG delayed release capsule Take 1 capsule by mouth Daily 11/18/22 11/18/23  Maeve Sylvester MD   ondansetron (ZOFRAN-ODT) 4 MG disintegrating tablet Take 1 tablet by mouth every 8 hours as needed for Nausea or Vomiting 11/18/22   Maeve Sylvester MD   Cholecalciferol (VITAMIN D3) 1.25 MG (28802 UT) CAPS take 1 capsule by mouth every week until finished 8/5/22   Historical Provider, MD   topiramate (TOPAMAX) 25 MG tablet Start Topiramate 25 mg. Take one tablet twice each day for one week. If the appetite suppression is insufficient, then increase to two tablets twice daily.  9/13/22   Kalyn Knapp MD   FIBER ADULT GUMMIES PO Take 1 tablet by mouth daily    Historical Provider, MD   Multiple Vitamin (MVI, CELEBRATE, CHEWABLE TABLET) Take 1 tablet by mouth daily 2/23/22   Hansa Knapp MD       ALLERGIES:  Vicodin [hydrocodone-acetaminophen]    SOCIAL Hx:  Social History     Socioeconomic History    Marital status: Single     Spouse name: Not on file    Number of children: Not on file    Years of education: Not on file    Highest education level: Not on file   Occupational History    Not on file   Tobacco Use    Smoking status: Former    Smokeless tobacco: Never    Tobacco comments:     quit 1 week ago   Vaping Use    Vaping Use: Never used   Substance and Sexual Activity    Alcohol use: Not Currently    Drug use: No    Sexual activity: Not on file   Other Topics Concern Not on file   Social History Narrative    Not on file     Social Determinants of Health     Financial Resource Strain: Not on file   Food Insecurity: Not on file   Transportation Needs: Not on file   Physical Activity: Not on file   Stress: Not on file   Social Connections: Not on file   Intimate Partner Violence: Not on file   Housing Stability: Not on file       ROS: 12 point review of symptoms was conducted, pertinent positives and negative were reviewed, aside from that all 12 systems were reviewed and negative. PHYSICAL EXAM:  VITALS:  Vitals:    12/06/22 0942   BP:    Pulse: 74   Resp: 23   Temp: 97.8 °F (36.6 °C)   SpO2: 94%         CONSTITUTIONAL:    Mildly drowsy under the recent administration of pain medication and anesthesia, otherwise cooperative, no apparent distress    EYES:    PERRL, EOMI, sclera clear without icterus, conjunctiva normal    ENT:    Normocephalic, atraumatic, sinuses nontender on palpation. External ears without lesions. Oral pharynx with slightly dry mucus membranes. NECK:    Supple, symmetrical, trachea midline, no adenopathy, thyroid symmetric, not enlarged and no tenderness, skin normal, no bruits, no JVD    HEMATOLOGIC/LYMPHATICS:    No cervical lymphadenopathy and no supraclavicular lymphadenopathy    LUNGS:    Symmetric. No increased work of breathing, diminished air exchange, clear to auscultation bilaterally, no wheezes, rhonchi, or rales,     CARDIOVASCULAR:    Normal apical impulse, regular rate and rhythm, normal S1 and S2, no murmur noted    ABDOMEN:    Unremarkable postoperative appearance with expected degree of tenderness to light palpation superimposed on a soft nondistended abdomen. Mildly hypoactive bowel sounds diffusely    MUSCULOSKELETAL:    There is no redness, warmth, or swelling of the joints. Tone is normal.    NEUROLOGIC:    Mildly drowsy on occasion, otherwise oriented to name, place and time. Cranial nerves II-XII are grossly intact.   Motor is 5 out of 5 bilaterally. SKIN:    No bruising or bleeding. No redness, warmth, or swelling    EXTREMITIES:    Peripheral pulses present. No edema, cyanosis, or swelling. LABORATORY DATA:  CBC with Differential:    Lab Results   Component Value Date/Time    WBC 9.3 12/02/2022 12:15 PM    RBC 4.17 12/02/2022 12:15 PM    HGB 12.5 12/02/2022 12:15 PM    HCT 37.8 12/02/2022 12:15 PM     12/02/2022 12:15 PM    MCV 90.6 12/02/2022 12:15 PM    MCH 30.0 12/02/2022 12:15 PM    MCHC 33.1 12/02/2022 12:15 PM    RDW 13.8 12/02/2022 12:15 PM    LYMPHOPCT 20.8 05/03/2022 10:45 AM    MONOPCT 8.1 05/03/2022 10:45 AM    BASOPCT 0.2 05/03/2022 10:45 AM    MONOSABS 0.90 05/03/2022 10:45 AM    LYMPHSABS 2.32 05/03/2022 10:45 AM    EOSABS 0.08 05/03/2022 10:45 AM    BASOSABS 0.02 05/03/2022 10:45 AM     BMP:    Lab Results   Component Value Date/Time     12/02/2022 12:15 PM    K 4.0 12/02/2022 12:15 PM     12/02/2022 12:15 PM    CO2 25 12/02/2022 12:15 PM    BUN 10 12/02/2022 12:15 PM    LABALBU 3.6 06/28/2022 09:25 AM    CREATININE 0.8 12/02/2022 12:15 PM    CALCIUM 8.6 12/02/2022 12:15 PM    GFRAA 58 10/17/2022 04:43 PM    LABGLOM >60 12/02/2022 12:15 PM    GLUCOSE 88 12/02/2022 12:15 PM       ASSESSMENT:  Morbid obesity with BMI 50.42 kg per metered squared status post Chantel-en-Y gastric bypass December 6, 2022 by Dr. Alyson Rodriguez      PLAN:  Annamaria Dailey is a 74-year-old female who had gastric bypass procedure as above. Symptomatic and supportive care, activity and dietary advancement as per the surgery team.  All medications have been reviewed and appropriately reconciled as per my discretion. Increase activity, ambulation and incentive spirometer usage as we will wean oxygen postoperatively as condition allows. Routine lab will be assessed including A1c, lipid panel and thyroid studies will be addressed accordingly. Underlying co-morbidites will be addressed during hospitalization as well.  Labs and vital signs will be monitored closely and addressed accordingly. See additional orders for details.      NIMCO Mon - CNP  10:06 AM  12/6/2022    Electronically signed by NIMCO Mon CNP on 12/6/22 at 10:06 AM EST

## 2022-12-07 ENCOUNTER — TELEPHONE (OUTPATIENT)
Dept: BARIATRICS/WEIGHT MGMT | Age: 47
End: 2022-12-07

## 2022-12-07 VITALS
WEIGHT: 293 LBS | OXYGEN SATURATION: 98 % | DIASTOLIC BLOOD PRESSURE: 74 MMHG | HEIGHT: 65 IN | BODY MASS INDEX: 48.82 KG/M2 | RESPIRATION RATE: 22 BRPM | HEART RATE: 67 BPM | TEMPERATURE: 98.1 F | SYSTOLIC BLOOD PRESSURE: 131 MMHG

## 2022-12-07 LAB
ALBUMIN SERPL-MCNC: 3.4 G/DL (ref 3.5–5.2)
ALP BLD-CCNC: 63 U/L (ref 35–104)
ALT SERPL-CCNC: 39 U/L (ref 0–32)
ANION GAP SERPL CALCULATED.3IONS-SCNC: 14 MMOL/L (ref 7–16)
AST SERPL-CCNC: 37 U/L (ref 0–31)
BASOPHILS ABSOLUTE: 0.02 E9/L (ref 0–0.2)
BASOPHILS RELATIVE PERCENT: 0.2 % (ref 0–2)
BILIRUB SERPL-MCNC: 0.3 MG/DL (ref 0–1.2)
BILIRUBIN DIRECT: <0.2 MG/DL (ref 0–0.3)
BILIRUBIN, INDIRECT: ABNORMAL MG/DL (ref 0–1)
BUN BLDV-MCNC: 10 MG/DL (ref 6–20)
CALCIUM SERPL-MCNC: 8.2 MG/DL (ref 8.6–10.2)
CHLORIDE BLD-SCNC: 100 MMOL/L (ref 98–107)
CHOLESTEROL, TOTAL: 148 MG/DL (ref 0–199)
CO2: 22 MMOL/L (ref 22–29)
CREAT SERPL-MCNC: 0.8 MG/DL (ref 0.5–1)
EOSINOPHILS ABSOLUTE: 0.02 E9/L (ref 0.05–0.5)
EOSINOPHILS RELATIVE PERCENT: 0.2 % (ref 0–6)
GFR SERPL CREATININE-BSD FRML MDRD: >60 ML/MIN/1.73
GLUCOSE BLD-MCNC: 84 MG/DL (ref 74–99)
HBA1C MFR BLD: 4.8 % (ref 4–5.6)
HCT VFR BLD CALC: 34.9 % (ref 34–48)
HDLC SERPL-MCNC: 54 MG/DL
HEMOGLOBIN: 11.4 G/DL (ref 11.5–15.5)
IMMATURE GRANULOCYTES #: 0.04 E9/L
IMMATURE GRANULOCYTES %: 0.3 % (ref 0–5)
LDL CHOLESTEROL CALCULATED: 78 MG/DL (ref 0–99)
LYMPHOCYTES ABSOLUTE: 2.62 E9/L (ref 1.5–4)
LYMPHOCYTES RELATIVE PERCENT: 22.7 % (ref 20–42)
MAGNESIUM: 1.8 MG/DL (ref 1.6–2.6)
MCH RBC QN AUTO: 29.9 PG (ref 26–35)
MCHC RBC AUTO-ENTMCNC: 32.7 % (ref 32–34.5)
MCV RBC AUTO: 91.6 FL (ref 80–99.9)
MONOCYTES ABSOLUTE: 1.11 E9/L (ref 0.1–0.95)
MONOCYTES RELATIVE PERCENT: 9.6 % (ref 2–12)
NEUTROPHILS ABSOLUTE: 7.72 E9/L (ref 1.8–7.3)
NEUTROPHILS RELATIVE PERCENT: 67 % (ref 43–80)
PDW BLD-RTO: 13.7 FL (ref 11.5–15)
PHOSPHORUS: 3.7 MG/DL (ref 2.5–4.5)
PLATELET # BLD: 244 E9/L (ref 130–450)
PMV BLD AUTO: 10 FL (ref 7–12)
POTASSIUM REFLEX MAGNESIUM: 3.8 MMOL/L (ref 3.5–5)
POTASSIUM SERPL-SCNC: 3.8 MMOL/L (ref 3.5–5)
RBC # BLD: 3.81 E12/L (ref 3.5–5.5)
SODIUM BLD-SCNC: 136 MMOL/L (ref 132–146)
T4 FREE: 1.15 NG/DL (ref 0.93–1.7)
TOTAL PROTEIN: 6.8 G/DL (ref 6.4–8.3)
TRIGL SERPL-MCNC: 78 MG/DL (ref 0–149)
TSH SERPL DL<=0.05 MIU/L-ACNC: 2.95 UIU/ML (ref 0.27–4.2)
VLDLC SERPL CALC-MCNC: 16 MG/DL
WBC # BLD: 11.5 E9/L (ref 4.5–11.5)

## 2022-12-07 PROCEDURE — 83735 ASSAY OF MAGNESIUM: CPT

## 2022-12-07 PROCEDURE — 84100 ASSAY OF PHOSPHORUS: CPT

## 2022-12-07 PROCEDURE — 80061 LIPID PANEL: CPT

## 2022-12-07 PROCEDURE — 85025 COMPLETE CBC W/AUTO DIFF WBC: CPT

## 2022-12-07 PROCEDURE — 6370000000 HC RX 637 (ALT 250 FOR IP): Performed by: NURSE PRACTITIONER

## 2022-12-07 PROCEDURE — 84439 ASSAY OF FREE THYROXINE: CPT

## 2022-12-07 PROCEDURE — 84443 ASSAY THYROID STIM HORMONE: CPT

## 2022-12-07 PROCEDURE — 80048 BASIC METABOLIC PNL TOTAL CA: CPT

## 2022-12-07 PROCEDURE — 2580000003 HC RX 258: Performed by: SURGERY

## 2022-12-07 PROCEDURE — 6370000000 HC RX 637 (ALT 250 FOR IP): Performed by: SURGERY

## 2022-12-07 PROCEDURE — C9113 INJ PANTOPRAZOLE SODIUM, VIA: HCPCS | Performed by: SURGERY

## 2022-12-07 PROCEDURE — 36415 COLL VENOUS BLD VENIPUNCTURE: CPT

## 2022-12-07 PROCEDURE — 80076 HEPATIC FUNCTION PANEL: CPT

## 2022-12-07 PROCEDURE — 83036 HEMOGLOBIN GLYCOSYLATED A1C: CPT

## 2022-12-07 PROCEDURE — 6360000002 HC RX W HCPCS: Performed by: SURGERY

## 2022-12-07 RX ADMIN — SODIUM CHLORIDE, PRESERVATIVE FREE 10 ML: 5 INJECTION INTRAVENOUS at 08:47

## 2022-12-07 RX ADMIN — OXYCODONE HYDROCHLORIDE 5 MG: 100 SOLUTION ORAL at 02:29

## 2022-12-07 RX ADMIN — SODIUM CHLORIDE, PRESERVATIVE FREE 40 MG: 5 INJECTION INTRAVENOUS at 08:33

## 2022-12-07 RX ADMIN — ENOXAPARIN SODIUM 30 MG: 100 INJECTION SUBCUTANEOUS at 08:34

## 2022-12-07 RX ADMIN — KETOROLAC TROMETHAMINE 30 MG: 30 INJECTION, SOLUTION INTRAMUSCULAR at 08:44

## 2022-12-07 RX ADMIN — SODIUM CHLORIDE, POTASSIUM CHLORIDE, SODIUM LACTATE AND CALCIUM CHLORIDE: 600; 310; 30; 20 INJECTION, SOLUTION INTRAVENOUS at 05:05

## 2022-12-07 RX ADMIN — ACETAMINOPHEN 650 MG: 160 SUSPENSION ORAL at 13:44

## 2022-12-07 RX ADMIN — OXYCODONE HYDROCHLORIDE 5 MG: 100 SOLUTION ORAL at 09:57

## 2022-12-07 RX ADMIN — KETOROLAC TROMETHAMINE 30 MG: 30 INJECTION, SOLUTION INTRAMUSCULAR at 05:05

## 2022-12-07 RX ADMIN — ACETAMINOPHEN 650 MG: 160 SUSPENSION ORAL at 08:42

## 2022-12-07 RX ADMIN — ACETAMINOPHEN 650 MG: 160 SUSPENSION ORAL at 02:26

## 2022-12-07 RX ADMIN — TOPIRAMATE 50 MG: 100 TABLET, FILM COATED ORAL at 08:33

## 2022-12-07 ASSESSMENT — PAIN SCALES - GENERAL
PAINLEVEL_OUTOF10: 4
PAINLEVEL_OUTOF10: 7
PAINLEVEL_OUTOF10: 8
PAINLEVEL_OUTOF10: 6
PAINLEVEL_OUTOF10: 3
PAINLEVEL_OUTOF10: 4
PAINLEVEL_OUTOF10: 9
PAINLEVEL_OUTOF10: 8

## 2022-12-07 ASSESSMENT — PAIN DESCRIPTION - DESCRIPTORS
DESCRIPTORS: SORE
DESCRIPTORS: ACHING;SORE
DESCRIPTORS: CRAMPING;SHARP
DESCRIPTORS: CRAMPING;SHARP

## 2022-12-07 ASSESSMENT — PAIN DESCRIPTION - LOCATION
LOCATION: ABDOMEN

## 2022-12-07 ASSESSMENT — PAIN DESCRIPTION - FREQUENCY: FREQUENCY: INTERMITTENT

## 2022-12-07 ASSESSMENT — PAIN DESCRIPTION - ORIENTATION
ORIENTATION: RIGHT;LEFT
ORIENTATION: RIGHT;LEFT

## 2022-12-07 ASSESSMENT — PAIN DESCRIPTION - PAIN TYPE: TYPE: SURGICAL PAIN

## 2022-12-07 NOTE — TELEPHONE ENCOUNTER
[facility]        Weight Loss Center       Discharge Review for     Chantel-en-Y Gastric Bypass                    And         Sleeve Gastrectomy      Reviewed with patient the following for discharge home:     Incision care- yes  Walking- yes  Elevate Head of Bed- yes  Infection Control- yes  Post op medications (PPI, Carafate and medication for nausea, Zofran) - yes  Review of Clear Liquid Diet- yes  Review of Full Liquid Diet- yes  Use of Emergency Room- yes  How and When to Call Bariatric Office- yes  Reminder of PCP Follow Up Appointment and Lab Draw- yes  Discuss Post-Op Call Schedule- yes  Any other issues- yes     Completed by Cinthya Waggoner MA

## 2022-12-07 NOTE — PROGRESS NOTES
Internal Medicine Progress Note    [facility]    MICHELA Brock D.O., RAMONA Epstein, MSN, APRN, NP-C  Alen Lomas. Anna Campbell, MSN, APRN-CNP     Primary Care Physician: Joel Staples DO   Admitting Physician:  Keri Fortune MD  Admission date and time: 12/6/2022  6:07 AM    Room:  [address] [place] [address]  Admitting diagnosis: Morbid obesity ([facility] [address] [E66.01]  S/P gastric bypass [Z98.84]    Patient Name: Yohana Vazquez  MRN: [id number]    Date of Service: 12/7/2022     Subjective:  Pamela Carroll is a 49 y.o. female who was seen and examined today,12/7/2022, at the bedside. Ashley tolerated bariatric surgery yesterday without complication. She describes postoperative abdominal pain as to be expected. She has been ambulatory without difficulty. She is tolerating her liquid diet. Review of System:   Constitutional:   Denies fever or chills, weight loss or gain, fatigue or malaise. HEENT:   Denies ear pain, sore throat, sinus or eye problems. Cardiovascular:   Denies any chest pain, irregular heartbeats, or palpitations. Respiratory:   Denies shortness of breath, coughing, sputum production, hemoptysis, or wheezing. Gastrointestinal:   Postoperative abdominal pain as to be expected. Tolerating her liquid diet. Genitourinary:    Denies any urgency, frequency, hematuria. Voiding  without difficulty. Extremities:   Denies lower extremity swelling, edema or cyanosis. Neurology:    Denies any headache or focal neurological deficits, Denies generalized weakness or memory difficulty. Psch:   Denies being anxious or depressed. Musculoskeletal:    Denies  myalgias, joint complaints or back pain. Integumentary:   Denies any rashes, ulcers, or excoriations. Denies bruising. Hematologic/Lymphatic:  Denies bruising or bleeding. Physical Exam:  I/O this shift:  In: 2255.6 [P.O.:330;  I.V.:1925.6]  Out: 500 [Urine:500]    Intake/Output Summary (Last 24 hours) at 12/7/2022 0653  Last data filed at 12/7/2022 4709  Gross per 24 hour   Intake 4665.61 ml   Output 1475 ml   Net 3190.61 ml   I/O last 3 completed shifts: In: 1 [P.O.:360; I.V.:2000; IV Piggyback:50]  Out: 975 [Urine:900; Blood:75]  Patient Vitals for the past 96 hrs (Last 3 readings):   Weight   12/06/22 1315 (!) 303 lb (137.4 kg)     Vital Signs:   Blood pressure (!) 142/79, pulse 77, temperature 98.2 °F (36.8 °C), temperature source Oral, resp. rate 20, height 5' 5\" (1.651 m), weight (!) 303 lb (137.4 kg), last menstrual period 04/06/2015, SpO2 100 %. General appearance:  Alert, responsive, oriented to person, place, and time. Well preserved, alert, no distress. Head:  Normocephalic. No masses, lesions or tenderness. Eyes:  PERRLA. EOMI. Sclera clear. Buccal mucosa moist.  ENT:  Ears normal. Mucosa normal.  Neck:    Supple. Trachea midline. No thyromegaly. No JVD. No bruits. Heart:    Rhythm regular. Rate controlled. No murmurs. Lungs:    Symmetrical. Clear to auscultation bilaterally. No wheezes. No rhonchi. No rales. Abdomen:   Soft. Tender to palpation diffusely as to be expected. Bowel sounds are hypoactive. Extremities:    Peripheral pulses present. No peripheral edema. No ulcers. No cyanosis. No clubbing. Neurologic:    Alert x 3. No focal deficit. Cranial nerves grossly intact. No focal weakness. Psych:   Behavior is normal. Mood appears normal. Speech is not rapid and/or pressured. Musculoskeletal:   Spine ROM normal. Muscular strength intact. Gait not assessed. Integumentary:  No rashes  Skin normal color and texture.   Genitalia/Breast:  Deferred    Medication:  Scheduled Meds:   scopolamine  1 patch TransDERmal Q72H    sodium chloride flush  5-40 mL IntraVENous 2 times per day    acetaminophen  650 mg Oral Q6H    ketorolac  30 mg IntraVENous Q6H    [START ON 12/9/2022] scopolamine  1 patch TransDERmal Q72H    pantoprazole (PROTONIX) 40 mg injection  40 mg IntraVENous Daily    enoxaparin  30 mg SubCUTAneous BID    topiramate  50 mg Oral BID     Continuous Infusions:   lactated ringers 125 mL/hr at 12/07/22 7982    sodium chloride         Objective Data:  CBC with Differential:    Lab Results   Component Value Date/Time    WBC 11.5 12/07/2022 05:42 AM    RBC 3.81 12/07/2022 05:42 AM    HGB 11.4 12/07/2022 05:42 AM    HCT 34.9 12/07/2022 05:42 AM     12/07/2022 05:42 AM    MCV 91.6 12/07/2022 05:42 AM    MCH 29.9 12/07/2022 05:42 AM    MCHC 32.7 12/07/2022 05:42 AM    RDW 13.7 12/07/2022 05:42 AM    LYMPHOPCT 22.7 12/07/2022 05:42 AM    MONOPCT 9.6 12/07/2022 05:42 AM    BASOPCT 0.2 12/07/2022 05:42 AM    MONOSABS 1.11 12/07/2022 05:42 AM    LYMPHSABS 2.62 12/07/2022 05:42 AM    EOSABS 0.02 12/07/2022 05:42 AM    BASOSABS 0.02 12/07/2022 05:42 AM     BMP:    Lab Results   Component Value Date/Time     12/02/2022 12:15 PM    K 4.0 12/02/2022 12:15 PM     12/02/2022 12:15 PM    CO2 25 12/02/2022 12:15 PM    BUN 10 12/02/2022 12:15 PM    LABALBU 3.6 06/28/2022 09:25 AM    CREATININE 0.8 12/02/2022 12:15 PM    CALCIUM 8.6 12/02/2022 12:15 PM    GFRAA 58 10/17/2022 04:43 PM    LABGLOM >60 12/02/2022 12:15 PM    GLUCOSE 88 12/02/2022 12:15 PM       Assessment:  Morbid obesity with BMI 50.42 kg per metered squared status post Chantel-en-Y gastric bypass December 6, 2022 by Dr. Betty Roca:   Von Blas tolerated surgery without complication and is doing well postoperatively. She has been ambulatory without difficulty and is tolerating her liquid diet. Preliminary lab work from this morning is stable. She is acceptable for discharge home from internal medicine standpoint. Continue current therapy. See orders for further plan of care. More than 50% of my time was spent at the bedside counseling/coordinating care with the patient and/or family with face to face contact.   This time was spent reviewing notes and laboratory data as well as instructing and counseling the patient. Time I spent with the family or surrogate(s) is included only if the patient was incapable of providing the necessary information or participating in medical decisions. I also discussed the differential diagnosis and all of the proposed management plans with the patient and individuals accompanying the patient. I am readily available for any further decision-making and intervention.        Cyndi Uriostegui DO, F.A.C.O.I.  12/7/2022  6:53 AM

## 2022-12-07 NOTE — DISCHARGE SUMMARY
Physician Discharge Summary     Patient ID:  Park Robison  [id number]  84 y.o.  1975    Admit date: 12/6/2022    Discharge date and time: No discharge date for patient encounter. Admitting Physician: Gifty Cali MD     Admission Diagnoses: Morbid obesity ([facility] [address] [E66.01]  S/P gastric bypass [Z98.84]    Discharge Diagnoses: Principal Problem:    S/P gastric bypass  Active Problems: Morbid obesity ([facility] [address]  Resolved Problems:    * No resolved hospital problems. *      Admission Condition: good    Discharged Condition: stable    Indication for Admission: Postop Chantel-en-Y gastric bypass    Hospital Course/Procedures/Operation/treatments:   12/6 patient was admitted after undergoing an uncomplicated laparoscopic robotic assisted Chantel-en-Y gastric bypass. On 12/7 she was tolerating clear liquids without nausea or vomiting her pain was controlled and she was discharged home. Consults:   IP CONSULT TO HOSPITALIST    Significant Diagnostic Studies:   No results found. Discharge Exam:  GENERAL:  NAD. A&Ox3. HEAD:  Normocephalic. Atraumatic. EYES:   No scleral icterus. PERRL. LUNGS:  No increased work of breathing. CARDIOVASCULAR: RR  ABDOMEN: Soft appropriately tender, nondistended, obese, incisions clean dry intact covered with skin glue  EXTREMITIES:   MAEx4. Atraumatic. No LE edema.   SKIN:  Warm and dry        Disposition: home    In process/preliminary results:  Outstanding Order Results       Date and Time Order Name Status Description    12/7/2022  5:42 AM Hemoglobin A1C In process     12/6/2022 12:00 AM Surgical Pathology In process             Patient Instructions:   Current Discharge Medication List             Details   omeprazole (PRILOSEC) 20 MG delayed release capsule Take 1 capsule by mouth Daily  Qty: 30 capsule, Refills: 12    Associated Diagnoses: Gastroesophageal reflux disease without esophagitis      ondansetron (ZOFRAN-ODT) 4 MG disintegrating tablet Take 1 tablet by mouth every 8 hours as needed for Nausea or Vomiting  Qty: 15 tablet, Refills: 0    Associated Diagnoses: PONV (postoperative nausea and vomiting)      Cholecalciferol (VITAMIN D3) 1.25 MG (75776 UT) CAPS take 1 capsule by mouth every week until finished      topiramate (TOPAMAX) 25 MG tablet Start Topiramate 25 mg. Take one tablet twice each day for one week. If the appetite suppression is insufficient, then increase to two tablets twice daily. Qty: 180 tablet, Refills: 1    Associated Diagnoses: Class 3 severe obesity due to excess calories with serious comorbidity and body mass index (BMI) of 45.0 to 49.9 in adult (HCC)      FIBER ADULT GUMMIES PO Take 1 tablet by mouth daily      Multiple Vitamin (MVI, CELEBRATE, CHEWABLE TABLET) Take 1 tablet by mouth daily  Qty: 90 tablet, Refills: 1             Dr. Gifty Cali MD, MS  Discharge Instructions for Bariatric Surgery  Chantel-en-Y gastric bypass OR Sleeve Gastrectomy       HOME CARE   Keep the incision area clean and dry. The glue on the incision is waterproof so you can shower. Do not remove the surgical glue. Leave the incisions open to air. Only cover if drainage occurs. Place ice on painful incision for 1-2 days. Make sure you know how to use and continue to use your incentive spirometer every hour while awake at home. DIET   Drink frequently and always carry fluids with you while awake to maintain hydration. Follow the diet instructions that you received in your Nutrition Education Manual for the dates and times of when to start your Bariatric Clear Liquids and Bariatric Full Liquid Diet along with the protein supplements. DO NOT take the Bariatric Multivitamins, Iron, Calcium, Vitamin D or Vitamin b12 supplements until instructed to do so at your 2 week follow up appointment with your surgeon. Slow down intake of liquids if there is chest pressure, acid or fullness. Drinking too fast or too much at one time can result in pain and vomiting.  You may notice increased gas or changes in your bowel habits during the first month after surgery. Keep the bowels soft with stool softeners at first, then switch to Metamucil. If you are not able to drink 64 ounces of fluid a day, you will develop constipation. Keep the bowels moving daily. PHYSICAL ACTIVITY   Walk frequently and keep your legs elevated when sitting to prevent blood clots in the legs. Discomfort at the incisions is normal with increasing activity. Severe pain at the incisions is not expected and you should avoid activities that result in severe pain. Increase your activity gradually, walking at least 10 minutes every hour you are awake. Do not drive while taking narcotic pain medication as this can cause drowsiness. You must be narcotic free for at least 24 hours before driving. Do not bend, twist, pull, or lift over 20 pounds for the first 2 weeks after surgery. Then for the next 2 weeks after that, do not bend, twist, pull, or lift over 50 pounds. If anything causes pain or discomfort, stop! Breathe deeply every hour to prevent pneumonia. Continue to use your incentive spirometer at home as this will help to prevent pneumonia as well. Medications   Restart blood thinners in 24 hours if no signs of bleeding. Take Tylenol for pain. Take Colace 100 mg twice a day. Take Ondansetron (Zofran), Omeprazole (Prilosec), or Sucralfate (Carafate) as prescribed. Follow up with Primary Care Physician (PCP)regarding home medications you were taking prior to surgery or with the prescribing physician if not your PCP. Extended-release medications are not recommended. Your PCP should convert to another medication if possible. DO NOT STOP ANY PRESCRIBED MEDICATION WITHOUT TALKING TO YOUR DOCTOR. If diabetic, check blood sugars as ordered by PCP or Endocrinologist. Follow up with PCP or Endocrinologist regarding diabetic medications.    Make sure you take any medicines you were on for depression or anxiety. FOLLOW-UP   Follow-up appointment with surgeon 10-14 days after surgery. Complete lab work prior to this appointment. Follow-up with PCP and/or Endocrinologist prior to seeing surgeon. CALL [facility] WEIGHT LOSS OFFICE [phone] IF ANY OF THE FOLLOWING OCCURS TO BE SEEN IN THE OFFICE:   Signs of infection, fever, chills, redness, swelling, increasing pain, or discharge at the incision site. Nausea not relieved by medication, frequent vomiting and not able to keep anything down, and  excessive diarrhea (more than 8 episodes in 24 hours). Pain, burning, urgency, or frequency of urination or blood in the urine. Pain and/or swelling in your feet, calves, or legs. You have pain that does not get better after taking pain medication   You cannot pass stool or gas. You are sick to your stomach and cannot drink fluids. You have loose stitches, or your incision comes open. You have signs of a blood clot such as:  - pain in your calf, back of the knee, thigh, or groin. - redness and swelling in your leg or groin   If you feel dehydrated, call the office to have IV fluids ordered. Signs of dehydration include dizziness, tiredness, dry mouth, dry lips, decreased urine output, dark colored urine, headache, feeling thirsty and muscle cramps.  This can occur if you are not meeting your daily fluid intake goal.   Watch closely for changes in your health, and be sure to contact your doctor if you have any problems    FOR ANY OF THE FOLLOWING, GO TO THE ER:   SHORTNESS OF BREATH   CHEST PAIN   EXCESSIVE BLEEDING   SEVERE ABDOMINAL PAIN      IN CASE OF EMERGENCY, CALL 911 IMMEDIATELY      Follow up:   Marilin Kelly MD  [address] [facility]  [phone]    Schedule an appointment as soon as possible for a visit in 2 week(s)  For wound re-check       Signed:  Electronically signed by Barby Velasquez MD on 12/7/22 at 8:26 AM EST

## 2022-12-07 NOTE — PLAN OF CARE
Problem: Discharge Planning  Goal: Discharge to home or other facility with appropriate resources  Outcome: Progressing     Problem: Pain  Goal: Verbalizes/displays adequate comfort level or baseline comfort level  Outcome: Progressing     Problem: Safety - Adult  Goal: Free from fall injury  Outcome: Adequate for Discharge

## 2022-12-08 ENCOUNTER — TELEPHONE (OUTPATIENT)
Dept: BARIATRICS/WEIGHT MGMT | Age: 47
End: 2022-12-08

## 2022-12-09 ENCOUNTER — TELEPHONE (OUTPATIENT)
Dept: BARIATRICS/WEIGHT MGMT | Age: 47
End: 2022-12-09

## 2022-12-09 NOTE — TELEPHONE ENCOUNTER
[facility]:  RYGB or LSG - Dietary Phone Follow-up Form:  Sx Date:12/6/22  rs/p RYGB    Current Diet:____Bar Full Liq Diet__________________________       Patient's symptoms include the following:     Pt states  he / she has the following symptoms:    Nausea -  no  Vomiting -  No  Diarrhea -  no  Abdominal Cramping -  yes,   Gas -  yes,        Dizziness -  no   Fever - no //  Pt physically took his / her temperature today  - No,    Constipation -  no  Bloody Stools - no  Tarry Stools - no  Shakiness -  no   Low Blood Sugar -  no  Feels Faint -  no   Excessive Salvia -  no  Other  - None  Pt started his / her Colace 100 mgs twice daily -  Pt did not purchase. Drew Moran reviewed pt needs to take Colace 100 mgs twice daily poke holes to absorb. Pt is taking PPI Medications or Carafate as instructed -  Prilosec - Pt is taking daily     Hunger during the liquid phase no   Reports no restriction during the liquid phase no  Reports moderate restriction during liquid phase no  Reports severe restriction during liquid phase no  Can't tolerate liquids no  Food gets stuck frequently no    Reflux Symptoms no       24 Hour Recall: Bar Cl Liquid Diet   Low Sodium Broth, Diet Jell-o and Water - Pt reports 1 oz fluid every 15 minutes  Water Intake:12 oz  Other Beverage:None  Pt  states denies dehydration on today's date 12/9/22    Protein Supplement: Drew Moran reviewed pt can start his / her protein supplement on 12/9/22. Drew Moran reviewed how pt can mix his / her protein supplement - 2 scoops Isopure mixed in 12 oz Fat Free Fair Life Milk broken down into 4 meals 3 oz in size. Pt verbalized understanding. How many times a day do you take your Protein Supplement: 4    Instructed per Standing Orders: yes,  Bariatric Full Liquid Diet . Drew Moran reviewed Bar Full Liquid Diet and reinforced diet concepts. Pt has education book and handouts and states he/ she is following the instruction given.  Use of protein supplement was reviewed with how to mix his / her protein supplement. Pt verbalized understanding. Pt instructed to call with any dietary questions. Pg 15 in pt manual reviewed    POC D/T problem noted above: None    Surgeon Notified:no    PCP Appt - Pt states she does not have one scheduled with her PCP. Drew Moran instructed pt to call and schedule one for Monday or Tuesday of next week. Labs  - Reviewed with pt to have her labs drawn Monday or Tuesday of next week also. Pt verbalized understanding.      Patient Response:  Verbalized understanding of the above instruction: yes,   Will keep detailed food records for 2 week f/u appt: yes,   Will return to [facility] for his her 2 week f/u appointment - Reminded pt to have labs drawn

## 2022-12-12 ENCOUNTER — TELEPHONE (OUTPATIENT)
Dept: BARIATRICS/WEIGHT MGMT | Age: 47
End: 2022-12-12

## 2022-12-12 ENCOUNTER — HOSPITAL ENCOUNTER (OUTPATIENT)
Age: 47
Discharge: HOME | End: 2022-12-12
Payer: MEDICAID

## 2022-12-12 DIAGNOSIS — K91.2 MALNUTRITION FOLLOWING GASTROINTESTINAL SURGERY: ICD-10-CM

## 2022-12-12 LAB
ALBUMIN SERPL-MCNC: 3.8 G/DL (ref 3.5–5.2)
ALP BLD-CCNC: 63 U/L (ref 35–104)
ALT SERPL-CCNC: 33 U/L (ref 0–32)
ANION GAP SERPL CALCULATED.3IONS-SCNC: 16 MMOL/L (ref 7–16)
AST SERPL-CCNC: 23 U/L (ref 0–31)
BILIRUB SERPL-MCNC: 0.5 MG/DL (ref 0–1.2)
BUN BLDV-MCNC: 14 MG/DL (ref 6–20)
CALCIUM SERPL-MCNC: 9.6 MG/DL (ref 8.6–10.2)
CHLORIDE BLD-SCNC: 102 MMOL/L (ref 98–107)
CO2: 17 MMOL/L (ref 22–29)
CREAT SERPL-MCNC: 0.9 MG/DL (ref 0.5–1)
GFR SERPL CREATININE-BSD FRML MDRD: >60 ML/MIN/1.73
GLUCOSE BLD-MCNC: 79 MG/DL (ref 74–99)
HCT VFR BLD CALC: 40.3 % (ref 34–48)
HEMOGLOBIN: 12.8 G/DL (ref 11.5–15.5)
MCH RBC QN AUTO: 29.3 PG (ref 26–35)
MCHC RBC AUTO-ENTMCNC: 31.8 % (ref 32–34.5)
MCV RBC AUTO: 92.2 FL (ref 80–99.9)
PDW BLD-RTO: 13.8 FL (ref 11.5–15)
PLATELET # BLD: 275 E9/L (ref 130–450)
PMV BLD AUTO: 11.7 FL (ref 7–12)
POTASSIUM SERPL-SCNC: 4.5 MMOL/L (ref 3.5–5)
RBC # BLD: 4.37 E12/L (ref 3.5–5.5)
SODIUM BLD-SCNC: 135 MMOL/L (ref 132–146)
TOTAL PROTEIN: 8.4 G/DL (ref 6.4–8.3)
WBC # BLD: 9.1 E9/L (ref 4.5–11.5)

## 2022-12-12 PROCEDURE — 85027 COMPLETE CBC AUTOMATED: CPT

## 2022-12-12 PROCEDURE — 36415 COLL VENOUS BLD VENIPUNCTURE: CPT

## 2022-12-12 PROCEDURE — 80053 COMPREHEN METABOLIC PANEL: CPT

## 2022-12-12 NOTE — TELEPHONE ENCOUNTER
Post op questions:    Nausea/vomiting present (YES/NO):no    Fever > 101/chills present (YES/NO):no    Tolerating liquids (YES/NO):yes  Ounces per day: 50  Protein shakes:yes    Taking post op medications, Carafate/Omeprazole & Zofran (YES/NO): yes    Is patient up and walking (YES/NO):yes    Having bowel movements (YES/NO):yes    Are incisions healing well (YES/NO):yes    Having any problems or concerns that need to be addressed:  none

## 2022-12-23 ENCOUNTER — INITIAL CONSULT (OUTPATIENT)
Dept: BARIATRICS/WEIGHT MGMT | Age: 47
End: 2022-12-23

## 2022-12-23 ENCOUNTER — OFFICE VISIT (OUTPATIENT)
Dept: BARIATRICS/WEIGHT MGMT | Age: 47
End: 2022-12-23
Payer: MEDICAID

## 2022-12-23 VITALS — WEIGHT: 282 LBS | HEIGHT: 65 IN | BODY MASS INDEX: 46.98 KG/M2

## 2022-12-23 VITALS
HEART RATE: 74 BPM | BODY MASS INDEX: 46.98 KG/M2 | SYSTOLIC BLOOD PRESSURE: 169 MMHG | RESPIRATION RATE: 20 BRPM | HEIGHT: 65 IN | TEMPERATURE: 97.3 F | WEIGHT: 282 LBS | DIASTOLIC BLOOD PRESSURE: 79 MMHG

## 2022-12-23 DIAGNOSIS — E66.01 MORBID OBESITY DUE TO EXCESS CALORIES (HCC): ICD-10-CM

## 2022-12-23 DIAGNOSIS — Z71.3 DIETARY COUNSELING: Primary | ICD-10-CM

## 2022-12-23 DIAGNOSIS — K91.2 MALNUTRITION FOLLOWING GASTROINTESTINAL SURGERY: Primary | ICD-10-CM

## 2022-12-23 PROCEDURE — 99024 POSTOP FOLLOW-UP VISIT: CPT | Performed by: SURGERY

## 2022-12-23 PROCEDURE — 99999 PR OFFICE/OUTPT VISIT,PROCEDURE ONLY: CPT

## 2022-12-23 PROCEDURE — 99211 OFF/OP EST MAY X REQ PHY/QHP: CPT

## 2022-12-23 NOTE — PATIENT INSTRUCTIONS
Please continue to take your vitamin and mineral supplements as instructed. If you received a blood work prescription today for laboratory monitoring due prior to your next routine follow-up visit, please have this blood work obtained 10 to 14 days prior to your next visit. It is important to fast for 12 hours prior to routine weight loss surgery blood work, EXCEPT for drinking water, to ensure accuracy of results. Please report nausea, vomiting, abdominal pain, or any other problems you experience to your surgeon. For problems related to weight loss surgery, it is best to go to [address] [place] [address] Emergency Department and have your surgeon paged.

## 2022-12-23 NOTE — PROGRESS NOTES
Patient is 2 wk LRYGB. Incisions are healing well. Water intake is good. Protein through shakes and foods. Bowel movements are good. Dietary.

## 2022-12-23 NOTE — PROGRESS NOTES
Medical Nutrition Therapy (MNT) Assessment     Pt. Name: Karrie Madison   Date:12/23/2022  F/U Appt: Sx Type 2 week rygb      Ht 5' 5\" (1.651 m)   Wt 282 lb (127.9 kg)   LMP 04/06/2015   BMI 46.93 kg/m²  Height: 5' 5\" (1.651 m) Weight: 282 lb (127.9 kg)   IBW:ideal body weight   155 lbs  % EBWL: 14%     Wt Readings from Last 3 Encounters:   12/23/22 282 lb (127.9 kg)   12/23/22 282 lb (127.9 kg)   12/06/22 (!) 303 lb (137.4 kg)                     Protein supplements:   Daily Protein needs (based on 1.0 gram per kg of IBW)  70-80 grams       Subjective:                    MNT ADVANCE TO:     Bariatric puree diet with MVI, Calcium & Protein Supplements          SWLC Bowel Protocol:  no - Diarrhea  No - Constipation  How much plain water are you drinking daily 60 oz            How many meals a day 6 / Portions Sizes of Meals are 3 oz          12/12/22 Labs: t protein 8.4H, ALT 33H    Supplements: not yet - will start now    Estimated Daily Nutritional Needs: Based on Bariatric procedure for Wt. Loss  Energy: Will be calculated at Maintenance Stage    Protein: 60 - 80 gms Daily    MNT Plan and Additional Education: RD/LD reviewed Bariatric Puree Diet and how to puree food. Encouraged pt to meet protein and fluid needs daily. Pt. verbalized understanding. Handouts given. Pt. Instructed to call with questions. MEDICAL NUTRITION THERAPY (MNT) - Nutrition Care Plan   (The patient has been educated and given written education material that reinforces the following dietary guidelines for Bariatric Surgery)  Goal:  Patient able to verbalize the following dietary concepts:  3 to 4 ounce portions per meal     6 small meals daily      60 to 80 grams of protein   48 to 64 ounces of fluid daily (water)     Always consume protein item first with all meals   Pt is aware wt loss is pt controlled.    Slow Meals - 30-35 chews per bite / Meals 30 - 45 minutes long            The RD / LD reviewed with the patient that the dietary goals of the bariatric patient is to ensure that patient is able to meet established nutritional needs for a Bariatric Surgery  Patient at this time in order to promote healing, prevent significant weight changes, prevent skin breakdown and abnormal lab values, prevent complications, and tolerance to diet and texture of foods. Pt is able to identify proper food choices and needed changes and is able to explain proper food preparation. RD / LD reviewed compliance with assigned diet stages, volume of food and drink consumed, timing of meals, amount of protein and energy intake, daily vitamin and mineral supplementation, identify food cravings and any adverse reactions associated with intake of food and drink. RD / LD uses behavioral tools such as goal setting, MI, and self-monitoring, to reinforce the anatomic and physiologic effects of the surgery, so that the described behaviors become more of a habit not simply a reaction to the altered anatomy. Care Plan:   Rd/Ld Addressed Food Records or 24-Hour Recall  Rd / Ld encouraged patient to exercise at least 30 minutes daily  Rd / Ld instructed patient on how to increase oral protein intake within the diet. Pt. can verbalize he / she will need to consume 60 - 80 grams. Rd / Ld instructed the patient on how to increase the use of protein supplements within the diet. Pt. was instructed on how to increase water intake. Patient will need to consume 48 - 64 oz. of just plain water in addition to 30 oz. of non-caloric beverages. Handouts given to patient  RD / LD encouraged oral intake    RD / LD encouraged pt. to keep food records.       Pt. is able to verbalize diet concepts         Yes - Pt. diet was advanced to the following stage ( See above)     Good - Dietary Compliance

## 2022-12-23 NOTE — PROGRESS NOTES
Westley Kim  12/23/2022  Laparoscopic Chantel-en- Y Gastric Bypass  Two weeks Post-Op Follow-up. Subjective:   Westley Kim is a 49 y.o. female is two weeks post Laparoscopic Chantel-en-Y Gastric Bypass. The patient is not having any pain. Reports no problems with swallowing liquids, bowel movements, voiding, or the wounds. She is not having swallowing difficulty, NOT TAKING VITAMINS AS PER PROTOCOL. She is meeting fluid recommendations of at least 64 ounces per day and is meeting protein recommendations. Exercise: no regular exercise. 282 lb (127.9 kg) Today's weight represents a weight loss of 21 pounds since surgery. Prior to Admission medications    Medication Sig Start Date End Date Taking? Authorizing Provider   omeprazole (PRILOSEC) 20 MG delayed release capsule Take 1 capsule by mouth Daily 11/18/22 11/18/23  Diana Silver MD   ondansetron (ZOFRAN-ODT) 4 MG disintegrating tablet Take 1 tablet by mouth every 8 hours as needed for Nausea or Vomiting 11/18/22   Diana Silver MD   Cholecalciferol (VITAMIN D3) 1.25 MG (61355 UT) CAPS take 1 capsule by mouth every week until finished 8/5/22   Historical Provider, MD   topiramate (TOPAMAX) 25 MG tablet Start Topiramate 25 mg. Take one tablet twice each day for one week. If the appetite suppression is insufficient, then increase to two tablets twice daily.  9/13/22   Kalyn Knapp MD   FIBER ADULT GUMMIES PO Take 1 tablet by mouth daily    Historical Provider, MD   Multiple Vitamin (MVI, CELEBRATE, CHEWABLE TABLET) Take 1 tablet by mouth daily 2/23/22   Rut March MD        Physical exam:  VITALS: BP (!) 169/79 (Site: Right Lower Arm, Position: Sitting, Cuff Size: Medium Adult)   Pulse 74   Temp 97.3 °F (36.3 °C) (Temporal)   Resp 20   Ht 5' 5\" (1.651 m)   Wt 282 lb (127.9 kg)   LMP 04/06/2015   BMI 46.93 kg/m²    General appearance: alert, appears stated age and cooperative  Head: Normocephalic, without obvious abnormality, atraumatic  Neck: no adenopathy, no carotid bruit, no JVD, supple, symmetrical, trachea midline and thyroid not enlarged, symmetric, no tenderness/mass/nodules  Lungs: clear to auscultation bilaterally  Heart: regular rate and rhythm  Abdomen:  Incisions healing well, surgical glue in place, no allergic reaction, no cellulitis  Extremities: extremities normal, atraumatic, no cyanosis or edema    Assessment:    She is two weeks post laparoscopic gastric bypass. Plan:   Doing well. Walk as much as possible but keep your legs elevated when sitting. Continue deep breathing exercizes. Transition to the high protein Pureed-liquid diet. Continue drinking 1 oz every 10-15 minutes to prevent dehydration. Slowly increase this amount as tolerated. Aim for 60 gm Protein and 90 oz of liquids per day. Slow down if you feel chest pressure, acid or fullness. Track protein and fluid intake and bring to your next appointment. Follow up in 4 weeks and call the clinic if questions arise in the meantime. Repeat labs in one month. Make sure the bowel move daily by taking fiber such as Metamucil.       Physician Signature: Electronically signed by Dr. Maegan Leary MD

## 2023-01-03 ENCOUNTER — TELEPHONE (OUTPATIENT)
Dept: BARIATRICS/WEIGHT MGMT | Age: 48
End: 2023-01-03

## 2023-01-03 NOTE — TELEPHONE ENCOUNTER
Patient called with question about medication. Spoke with patient and she wanted to know if she could resume taking ibuprofen. I explained that long term use of ibuprofen is not recommended due to possibility of causing ulcers. Short term use is possible as long as she continues to take her PPI or Carafate. Patient verbalized understanding.

## 2023-01-24 ENCOUNTER — OFFICE VISIT (OUTPATIENT)
Dept: BARIATRICS/WEIGHT MGMT | Age: 48
End: 2023-01-24
Payer: MEDICAID

## 2023-01-24 ENCOUNTER — INITIAL CONSULT (OUTPATIENT)
Dept: BARIATRICS/WEIGHT MGMT | Age: 48
End: 2023-01-24

## 2023-01-24 VITALS
SYSTOLIC BLOOD PRESSURE: 152 MMHG | TEMPERATURE: 97.7 F | DIASTOLIC BLOOD PRESSURE: 79 MMHG | WEIGHT: 271 LBS | BODY MASS INDEX: 45.15 KG/M2 | HEART RATE: 75 BPM | HEIGHT: 65 IN | RESPIRATION RATE: 20 BRPM

## 2023-01-24 VITALS — HEIGHT: 65 IN | WEIGHT: 271 LBS | BODY MASS INDEX: 45.15 KG/M2

## 2023-01-24 DIAGNOSIS — K91.2 MALNUTRITION FOLLOWING GASTROINTESTINAL SURGERY: Primary | ICD-10-CM

## 2023-01-24 DIAGNOSIS — Z98.890 PONV (POSTOPERATIVE NAUSEA AND VOMITING): ICD-10-CM

## 2023-01-24 DIAGNOSIS — Z71.3 DIETARY COUNSELING: Primary | ICD-10-CM

## 2023-01-24 DIAGNOSIS — R11.2 PONV (POSTOPERATIVE NAUSEA AND VOMITING): ICD-10-CM

## 2023-01-24 DIAGNOSIS — E66.01 MORBID OBESITY DUE TO EXCESS CALORIES (HCC): ICD-10-CM

## 2023-01-24 PROCEDURE — G8427 DOCREV CUR MEDS BY ELIG CLIN: HCPCS | Performed by: NURSE PRACTITIONER

## 2023-01-24 PROCEDURE — G8484 FLU IMMUNIZE NO ADMIN: HCPCS | Performed by: NURSE PRACTITIONER

## 2023-01-24 PROCEDURE — G8417 CALC BMI ABV UP PARAM F/U: HCPCS | Performed by: NURSE PRACTITIONER

## 2023-01-24 PROCEDURE — 99212 OFFICE O/P EST SF 10 MIN: CPT

## 2023-01-24 PROCEDURE — 99999 PR OFFICE/OUTPT VISIT,PROCEDURE ONLY: CPT

## 2023-01-24 PROCEDURE — 1036F TOBACCO NON-USER: CPT | Performed by: NURSE PRACTITIONER

## 2023-01-24 PROCEDURE — 99213 OFFICE O/P EST LOW 20 MIN: CPT | Performed by: NURSE PRACTITIONER

## 2023-01-24 RX ORDER — ONDANSETRON 4 MG/1
4 TABLET, ORALLY DISINTEGRATING ORAL EVERY 8 HOURS PRN
Qty: 15 TABLET | Refills: 0 | Status: SHIPPED | OUTPATIENT
Start: 2023-01-24

## 2023-01-24 NOTE — PROGRESS NOTES
Patient is 6 wk LRYGB. Water intake is good. Protein through shakes and foods. Vitamin intake is good. Bowel movements are good. Some nausea.     Dietary

## 2023-01-24 NOTE — PATIENT INSTRUCTIONS
Please continue to take your vitamin and mineral supplements as instructed. If you received a blood work prescription today for laboratory monitoring due prior to your next routine follow-up visit, please have this blood work obtained 10 to 14 days prior to your next visit. It is important to fast for 12 hours prior to routine weight loss surgery blood work, EXCEPT for drinking water, to ensure accuracy of results. Please report nausea, vomiting, abdominal pain, or any other problems you experience to your surgeon. For problems related to weight loss surgery, it is best to go to [address] [place] [address] Emergency Department and have your surgeon paged.

## 2023-01-24 NOTE — PROGRESS NOTES
Medical Nutrition Therapy (MNT) Assessment     Pt. Name: Ashley Polanco   Date:1/24/2023  F/U Appt: Sx Type 6 week rygb      Ht 5' 5\" (1.651 m)   Wt 271 lb (122.9 kg)   LMP 04/06/2015   BMI 45.10 kg/m²  Height: 5' 5\" (1.651 m) Weight: 271 lb (122.9 kg)   IBW:ideal body weight   155 lbs % EBWL: 21%     Wt Readings from Last 3 Encounters:   01/24/23 271 lb (122.9 kg)   01/24/23 271 lb (122.9 kg)   12/23/22 282 lb (127.9 kg)                     Protein supplements:   Daily Protein needs (based on 1.0 gram per kg of IBW)  70-80 grams   Using Isopure - into Fairlife Milk    Subjective:                    MNT ADVANCE TO:     Bariatric soft diet with MVI, Calcium & Protein Supplements          SWLC Bowel Protocol:  no - Diarrhea  Yes - Constipation       Takes Miralax and Colace   How much plain water are you drinking daily 64 oz plus            How many meals a day 6 / Portions Sizes of Meals are 3-4 oz          Labs: labs not required today    Supplements: Bariatric Advantage Multi-Vitamin 1 tablet 2 times Daily, Bariatric Advantage 29 mgs Iron 1 tablet Daily, Bariatric Advanatge Calcium Lozenges 1 tablet 3 times Daily , Dry Vitamin D3 5,000iu every day    Estimated Daily Nutritional Needs: Based on Bariatric procedure for Wt. Loss  Energy: Will be calculated at Maintenance Stage    Protein: 60 - 80 gms Daily    MNT Plan and Additional Education: RD/LD reviewed Bariatric Soft Diet.  Encouraged pt to meet protein and fluid needs daily. Pt. verbalized understanding. Handouts given. Pt. instructed to call with questions.     MEDICAL NUTRITION THERAPY (MNT) - Nutrition Care Plan   (The patient has been educated and given written education material that reinforces the following dietary guidelines for Bariatric Surgery)  Goal:  Patient able to verbalize the following dietary concepts:  3 to 4 ounce portions per meal     6 small meals daily      60 to 80 grams of protein   48 to 64 ounces of fluid daily (water)     Always  consume protein item first with all meals   Pt is aware wt loss is pt controlled. Slow Meals - 30-35 chews per bite / Meals 30 - 45 minutes long            The RD / LD reviewed with the patient that the dietary goals of the bariatric patient is to ensure that patient is able to meet established nutritional needs for a Bariatric Surgery  Patient at this time in order to promote healing, prevent significant weight changes, prevent skin breakdown and abnormal lab values, prevent complications, and tolerance to diet and texture of foods. Pt is able to identify proper food choices and needed changes and is able to explain proper food preparation. RD / LD reviewed compliance with assigned diet stages, volume of food and drink consumed, timing of meals, amount of protein and energy intake, daily vitamin and mineral supplementation, identify food cravings and any adverse reactions associated with intake of food and drink. RD / LD uses behavioral tools such as goal setting, MI, and self-monitoring, to reinforce the anatomic and physiologic effects of the surgery, so that the described behaviors become more of a habit not simply a reaction to the altered anatomy. Care Plan:   Rd/Ld Addressed Food Records or 24-Hour Recall  Rd / Ld encouraged patient to exercise at least 30 minutes daily  Rd / Ld instructed patient on how to increase oral protein intake within the diet. Pt. can verbalize he / she will need to consume 60 - 80 grams. Rd / Ld instructed the patient on how to increase the use of protein supplements within the diet. Pt. was instructed on how to increase water intake. Patient will need to consume 48 - 64 oz. of just plain water in addition to 30 oz. of non-caloric beverages. Handouts given to patient  RD / LD encouraged oral intake    RD / LD encouraged pt. to keep food records.       Pt. is able to verbalize diet concepts         Yes - Pt. diet was advanced to the following stage ( See above)     Good - Dietary Compliance

## 2023-01-24 NOTE — PROGRESS NOTES
Jean Carlos Ochoa  1/24/2023  [address]     Chantel-en- Y Gastric Bypass  6 weeks  Post-Operative Follow-up     Jean Carlos Ochoa is a 49 y.o. female who is 6 weeks post Laparoscopic Chantel-en-Y Gastric Bypass surgery. Reports that she is doing good. She is not having swallowing difficulty. Patient reports occasional nausea and vomiting. Reports that the vomiting occurs after she eats. Patient denies gastric reflux symptoms. Bowel movements are ok, occasionally constipated. Patient is compliant all of the time with the iron supplement and multivitamins and calcium + Vit D. She is meeting fluid recommendations of at least 64 ounces per day and is unsure if she meeting protein recommendations. She is doing 2 protein shakes per day. She  is exercising:  walking, upper and lower body resistance training . Patient is not taking fiber supplements. Weight=271 lb (122.9 kg)  Today's weight represents a total weight loss of 32 pounds since surgery with 0 pounds regained since the lowest weight. Prior to Admission medications    Medication Sig Start Date End Date Taking? Authorizing Provider   ondansetron (ZOFRAN-ODT) 4 MG disintegrating tablet Take 1 tablet by mouth every 8 hours as needed for Nausea or Vomiting 1/24/23  Yes NIMCO Jarrell CNP   omeprazole (PRILOSEC) 20 MG delayed release capsule Take 1 capsule by mouth Daily 11/18/22 11/18/23 Yes Leti Ortiz MD   Cholecalciferol (VITAMIN D3) 1.25 MG (76103 UT) CAPS take 1 capsule by mouth every week until finished 8/5/22  Yes Historical Provider, MD   topiramate (TOPAMAX) 25 MG tablet Start Topiramate 25 mg. Take one tablet twice each day for one week. If the appetite suppression is insufficient, then increase to two tablets twice daily.  9/13/22  Yes Kalyn Knapp MD   FIBER ADULT GUMMIES PO Take 1 tablet by mouth daily   Yes Historical Provider, MD   Multiple Vitamin (MVI, CELEBRATE, CHEWABLE TABLET) Take 1 tablet by mouth daily 2/23/22  Yes Kalyn Knapp MD        Allergies   Allergen Reactions    Vicodin [Hydrocodone-Acetaminophen] Hives           Past Medical History:   Diagnosis Date    Chronic pain of left knee     Class 3 severe obesity due to excess calories without serious comorbidity with body mass index (BMI) of 50.0 to 59.9 in adult [facility])     Enlarged thyroid     Morbid obesity due to excess calories ([address]        Past Surgical History:   Procedure Laterality Date    ABDOMEN SURGERY      OVARIAN CYST REMOVAL, TUBAL LIGATION, C SECTION    HERNIA REPAIR      x 2    HYSTERECTOMY, TOTAL ABDOMINAL (CERVIX REMOVED)  5/6/2015    Dr. Domingo [facility] N/A 12/6/2022    GASTRIC BYPASS CALLIE-EN-Y LAPAROSCOPIC ROBOTIC XI performed by Bernice Friedman MD at [address] [facility] 1/21/2022    RIGHT THYROID LOBECTOMY performed by Romaine Cano MD at [address] [place] [address] N/A 7/7/2022    EGD BIOPSY performed by Bernice Friedman MD at [place] [address]       Current Outpatient Medications   Medication Sig Dispense Refill    ondansetron (ZOFRAN-ODT) 4 MG disintegrating tablet Take 1 tablet by mouth every 8 hours as needed for Nausea or Vomiting 15 tablet 0    omeprazole (PRILOSEC) 20 MG delayed release capsule Take 1 capsule by mouth Daily 30 capsule 12    Cholecalciferol (VITAMIN D3) 1.25 MG (61609 UT) CAPS take 1 capsule by mouth every week until finished      topiramate (TOPAMAX) 25 MG tablet Start Topiramate 25 mg. Take one tablet twice each day for one week. If the appetite suppression is insufficient, then increase to two tablets twice daily. 180 tablet 1    FIBER ADULT GUMMIES PO Take 1 tablet by mouth daily      Multiple Vitamin (MVI, CELEBRATE, CHEWABLE TABLET) Take 1 tablet by mouth daily 90 tablet 1     No current facility-administered medications for this visit.        Allergies   Allergen Reactions    Vicodin [Hydrocodone-Acetaminophen] Hives       Family History   Problem Relation Age of Onset    High Blood Pressure Father     Cancer Father     High Blood Pressure Sister     Asthma Sister     Heart Disease Brother     High Blood Pressure Brother     Asthma Brother        Social History     Socioeconomic History    Marital status: Single     Spouse name: Not on file    Number of children: Not on file    Years of education: Not on file    Highest education level: Not on file   Occupational History    Not on file   Tobacco Use    Smoking status: Former    Smokeless tobacco: Never    Tobacco comments:     quit 1 week ago   Vaping Use    Vaping Use: Never used   Substance and Sexual Activity    Alcohol use: Not Currently    Drug use: No    Sexual activity: Not on file   Other Topics Concern    Not on file   Social History Narrative    Not on file     Social Determinants of Health     Financial Resource Strain: Not on file   Food Insecurity: Not on file   Transportation Needs: Not on file   Physical Activity: Not on file   Stress: Not on file   Social Connections: Not on file   Intimate Partner Violence: Not on file   Housing Stability: Not on file       A complete 10 system review was performed and are otherwise negative unless mentioned in the above HPI. Specific negatives are listed below but may not include all those reviewed.     General ROS: negative obtundation, AMS  ENT ROS: negative rhinorrhea, epistaxis  Allergy and Immunology ROS: negative itchy/watery eyes or nasal congestion  Hematological and Lymphatic ROS: negative spontaneous bleeding or bruising  Endocrine ROS: negative  lethargy, mood swings, palpitations or polydipsia/polyuria  Respiratory ROS: negative sputum changes, stridor, tachypnea or wheezing  Cardiovascular ROS: negative for - loss of consciousness, murmur or orthopnea  Gastrointestinal ROS: negative for - hematochezia or hematemesis  Genito-Urinary ROS: negative for -  genital discharge or hematuria  Musculoskeletal ROS: negative for - focal weakness, gangrene  Psych/Neuro ROS: negative for - visual or auditory hallucinations, suicidal ideation        Physical exam:   BP (!) 152/79 (Site: Left Lower Arm, Position: Sitting, Cuff Size: Large Adult)   Pulse 75   Temp 97.7 °F (36.5 °C) (Temporal)   Resp 20   Ht 5' 5\" (1.651 m)   Wt 271 lb (122.9 kg)   LMP 04/06/2015   BMI 45.10 kg/m²    General appearance: alert, appears stated age and cooperative  Head: Normocephalic, without obvious abnormality, atraumatic  Neck: no adenopathy, no carotid bruit, no JVD, supple, symmetrical, trachea midline and thyroid not enlarged, symmetric, no tenderness/mass/nodules  Lungs: clear to auscultation bilaterally  Heart: regular rate and rhythm  Abdomen: soft, non-tender; bowel sounds normal; no masses,  no organomegaly  Extremities: extremities normal, atraumatic, no cyanosis or edema    Assessment: Post Chantel-en- Y Gastric Bypass. She does not complain of GERD,  does not have sleep apnea,  does not have diabetes,  does not have hypertension off medical treatment. Cholesterol and triglycerides are normal.    1. Malnutrition following gastrointestinal surgery    - CBC; Future  - Comprehensive Metabolic Panel; Future  - Ferritin; Future  - Triglyceride; Future  - Cholesterol, Total; Future  - Zinc; Future  - Vitamin B12; Future  - Folate; Future  - Vitamin B1; Future  - Prealbumin; Future    2. PONV (postoperative nausea and vomiting)    - ondansetron (ZOFRAN-ODT) 4 MG disintegrating tablet; Take 1 tablet by mouth every 8 hours as needed for Nausea or Vomiting  Dispense: 15 tablet; Refill: 0      Plan:  Continue to eat a high protein, low calorie diet, eat small portions very slowly and chew well before swallowing. Drink plenty of water and fluids. Make sure to use fiber to keep the bowels regular. Try to exercise 7 days per week, maintain adequate variety and balance. Always notify the clinic if you have any medical problems. Follow up in 6 weeks.       Physician Signature: Electronically signed by Bouchra Johnson, NIMCO Baltazar CNP

## 2023-03-15 ENCOUNTER — TELEPHONE (OUTPATIENT)
Dept: BARIATRICS/WEIGHT MGMT | Age: 48
End: 2023-03-15

## 2023-03-15 NOTE — TELEPHONE ENCOUNTER
LM to joyce appt from 3/17/23 due to no labs being done. I cx this appt and dietary and asked pt to call and reschedule.

## 2023-03-20 ENCOUNTER — HOSPITAL ENCOUNTER (OUTPATIENT)
Age: 48
Discharge: HOME | End: 2023-03-20
Payer: MEDICAID

## 2023-03-20 DIAGNOSIS — K91.2 MALNUTRITION FOLLOWING GASTROINTESTINAL SURGERY: ICD-10-CM

## 2023-03-20 LAB
ALBUMIN SERPL-MCNC: 3.9 G/DL (ref 3.5–5.2)
ALP SERPL-CCNC: 85 U/L (ref 35–104)
ALT SERPL-CCNC: 7 U/L (ref 0–32)
ANION GAP SERPL CALCULATED.3IONS-SCNC: 9 MMOL/L (ref 7–16)
AST SERPL-CCNC: 16 U/L (ref 0–31)
BILIRUB SERPL-MCNC: 0.7 MG/DL (ref 0–1.2)
BUN SERPL-MCNC: 8 MG/DL (ref 6–20)
CALCIUM SERPL-MCNC: 8.5 MG/DL (ref 8.6–10.2)
CHLORIDE SERPL-SCNC: 100 MMOL/L (ref 98–107)
CHOLESTEROL, TOTAL: 156 MG/DL (ref 0–199)
CO2 SERPL-SCNC: 24 MMOL/L (ref 22–29)
CREAT SERPL-MCNC: 0.7 MG/DL (ref 0.5–1)
ERYTHROCYTE [DISTWIDTH] IN BLOOD BY AUTOMATED COUNT: 17 FL (ref 11.5–15)
FERRITIN SERPL-MCNC: 195 NG/ML
FOLATE SERPL-MCNC: 7.7 NG/ML (ref 4.8–24.2)
GLUCOSE SERPL-MCNC: 88 MG/DL (ref 74–99)
HCT VFR BLD AUTO: 41.2 % (ref 34–48)
HGB BLD-MCNC: 13 G/DL (ref 11.5–15.5)
MCH RBC QN AUTO: 30 PG (ref 26–35)
MCHC RBC AUTO-ENTMCNC: 31.6 % (ref 32–34.5)
MCV RBC AUTO: 95.2 FL (ref 80–99.9)
PLATELET # BLD AUTO: 258 E9/L (ref 130–450)
PMV BLD AUTO: 10.1 FL (ref 7–12)
POTASSIUM SERPL-SCNC: 3.7 MMOL/L (ref 3.5–5)
PREALB SERPL-MCNC: 15 MG/DL (ref 20–40)
PROT SERPL-MCNC: 7.2 G/DL (ref 6.4–8.3)
RBC # BLD AUTO: 4.33 E12/L (ref 3.5–5.5)
SODIUM SERPL-SCNC: 133 MMOL/L (ref 132–146)
TRIGL SERPL-MCNC: 94 MG/DL (ref 0–149)
VIT B12 SERPL-MCNC: 672 PG/ML (ref 211–946)
VITAMIN D 25-HYDROXY: 31 NG/ML (ref 30–100)
WBC # BLD: 7.2 E9/L (ref 4.5–11.5)

## 2023-03-20 PROCEDURE — 84478 ASSAY OF TRIGLYCERIDES: CPT

## 2023-03-20 PROCEDURE — 84425 ASSAY OF VITAMIN B-1: CPT

## 2023-03-20 PROCEDURE — 82607 VITAMIN B-12: CPT

## 2023-03-20 PROCEDURE — 82465 ASSAY BLD/SERUM CHOLESTEROL: CPT

## 2023-03-20 PROCEDURE — 80053 COMPREHEN METABOLIC PANEL: CPT

## 2023-03-20 PROCEDURE — 82306 VITAMIN D 25 HYDROXY: CPT

## 2023-03-20 PROCEDURE — 82728 ASSAY OF FERRITIN: CPT

## 2023-03-20 PROCEDURE — 84590 ASSAY OF VITAMIN A: CPT

## 2023-03-20 PROCEDURE — 85027 COMPLETE CBC AUTOMATED: CPT

## 2023-03-20 PROCEDURE — 84134 ASSAY OF PREALBUMIN: CPT

## 2023-03-20 PROCEDURE — 84630 ASSAY OF ZINC: CPT

## 2023-03-20 PROCEDURE — 82746 ASSAY OF FOLIC ACID SERUM: CPT

## 2023-03-20 PROCEDURE — 36415 COLL VENOUS BLD VENIPUNCTURE: CPT

## 2023-03-23 LAB — ZINC SERPL-MCNC: 61.4 UG/DL (ref 60–120)

## 2023-03-24 ENCOUNTER — TELEPHONE (OUTPATIENT)
Dept: BARIATRICS/WEIGHT MGMT | Age: 48
End: 2023-03-24

## 2023-03-24 LAB
ANNOTATION COMMENT IMP: NORMAL
RETINYL PALMITATE SERPL-MCNC: <0.02 MG/L (ref 0–0.1)
VIT A SERPL-MCNC: 0.32 MG/L (ref 0.3–1.2)

## 2023-03-24 NOTE — TELEPHONE ENCOUNTER
Ashley missed her 3 mo post op appt with Dr Rosario Ibarra today. I called her, but she did not answer. I LM for her to call back and we would be happy to reschedule.

## 2023-03-25 LAB — VIT B1 BLD-MCNC: 120 NMOL/L (ref 70–180)

## 2023-08-25 ENCOUNTER — INITIAL CONSULT (OUTPATIENT)
Dept: BARIATRICS/WEIGHT MGMT | Age: 48
End: 2023-08-25

## 2023-08-25 ENCOUNTER — OFFICE VISIT (OUTPATIENT)
Dept: BARIATRICS/WEIGHT MGMT | Age: 48
End: 2023-08-25
Payer: MEDICAID

## 2023-08-25 VITALS
SYSTOLIC BLOOD PRESSURE: 155 MMHG | TEMPERATURE: 97.8 F | HEIGHT: 65 IN | DIASTOLIC BLOOD PRESSURE: 97 MMHG | WEIGHT: 244 LBS | BODY MASS INDEX: 40.65 KG/M2 | RESPIRATION RATE: 20 BRPM | HEART RATE: 76 BPM

## 2023-08-25 VITALS — HEIGHT: 65 IN | WEIGHT: 244 LBS | BODY MASS INDEX: 40.65 KG/M2

## 2023-08-25 DIAGNOSIS — E66.01 MORBID OBESITY DUE TO EXCESS CALORIES (HCC): ICD-10-CM

## 2023-08-25 DIAGNOSIS — K91.2 MALNUTRITION FOLLOWING GASTROINTESTINAL SURGERY: Primary | ICD-10-CM

## 2023-08-25 DIAGNOSIS — Z98.890 PONV (POSTOPERATIVE NAUSEA AND VOMITING): ICD-10-CM

## 2023-08-25 DIAGNOSIS — R11.2 PONV (POSTOPERATIVE NAUSEA AND VOMITING): ICD-10-CM

## 2023-08-25 DIAGNOSIS — Z71.3 DIETARY COUNSELING: Primary | ICD-10-CM

## 2023-08-25 PROCEDURE — G8417 CALC BMI ABV UP PARAM F/U: HCPCS | Performed by: NURSE PRACTITIONER

## 2023-08-25 PROCEDURE — 99213 OFFICE O/P EST LOW 20 MIN: CPT | Performed by: NURSE PRACTITIONER

## 2023-08-25 PROCEDURE — 99999 PR OFFICE/OUTPT VISIT,PROCEDURE ONLY: CPT | Performed by: DIETITIAN, REGISTERED

## 2023-08-25 PROCEDURE — G8427 DOCREV CUR MEDS BY ELIG CLIN: HCPCS | Performed by: NURSE PRACTITIONER

## 2023-08-25 PROCEDURE — 1036F TOBACCO NON-USER: CPT | Performed by: NURSE PRACTITIONER

## 2023-08-25 RX ORDER — ONDANSETRON 4 MG/1
4 TABLET, ORALLY DISINTEGRATING ORAL EVERY 8 HOURS PRN
Qty: 15 TABLET | Refills: 0 | Status: SHIPPED | OUTPATIENT
Start: 2023-08-25

## 2023-08-25 NOTE — PROGRESS NOTES
adenopathy, no carotid bruit, no JVD, supple, symmetrical, trachea midline and thyroid not enlarged, symmetric, no tenderness/mass/nodules  Lungs: clear to auscultation bilaterally  Heart: regular rate and rhythm  Abdomen: soft, non-tender; bowel sounds normal; no masses,  no organomegaly  Extremities: extremities normal, atraumatic, no cyanosis or edema    Assessment: Post Chantel-en- Y Gastric Bypass. She does not complain of GERD,  does not have sleep apnea,  does not have diabetes,  does not have hypertension off medical treatment. Cholesterol and triglycerides are normal.    1. Malnutrition following gastrointestinal surgery    - Zinc; Future  - Vitamin D 25 Hydroxy; Future  - Folate; Future  - Vitamin B12; Future  - Vitamin B1; Future  - Ferritin; Future  - Comprehensive Metabolic Panel; Future  - CBC; Future  - Vitamin A; Future      Plan:  Continue to eat a high protein, low calorie diet, eat small portions very slowly and chew well before swallowing. Drink plenty of water and fluids. Make sure to use fiber to keep the bowels regular. Try to exercise 7 days per week, maintain adequate variety and balance. Always notify the clinic if you have any medical problems. Follow up in 3 months, or sooner if needed.       Physician Signature: Electronically signed by NIMCO Low CNP

## 2023-08-25 NOTE — PATIENT INSTRUCTIONS
Please continue to take your vitamin and mineral supplements as instructed. If you received a blood work prescription today for laboratory monitoring due prior to your next routine follow-up visit, please have this blood work obtained 10 to 14 days prior to your next visit. It is important to fast for 12 hours prior to routine weight loss surgery blood work, EXCEPT for drinking water, to ensure accuracy of results. Please report nausea, vomiting, abdominal pain, or any other problems you experience to your surgeon. For problems related to weight loss surgery, it is best to go to [facility] Emergency Department and have your surgeon paged.

## 2023-08-29 ENCOUNTER — HOSPITAL ENCOUNTER (EMERGENCY)
Age: 48
Discharge: HOME | End: 2023-08-29
Payer: MEDICAID

## 2023-08-29 VITALS
DIASTOLIC BLOOD PRESSURE: 90 MMHG | TEMPERATURE: 98.2 F | HEART RATE: 77 BPM | OXYGEN SATURATION: 98 % | RESPIRATION RATE: 16 BRPM | SYSTOLIC BLOOD PRESSURE: 154 MMHG

## 2023-08-29 DIAGNOSIS — N61.1 BREAST ABSCESS: Primary | ICD-10-CM

## 2023-08-29 PROCEDURE — 6370000000 HC RX 637 (ALT 250 FOR IP): Performed by: NURSE PRACTITIONER

## 2023-08-29 PROCEDURE — 87070 CULTURE OTHR SPECIMN AEROBIC: CPT

## 2023-08-29 PROCEDURE — 87205 SMEAR GRAM STAIN: CPT

## 2023-08-29 PROCEDURE — 10060 I&D ABSCESS SIMPLE/SINGLE: CPT

## 2023-08-29 PROCEDURE — 99283 EMERGENCY DEPT VISIT LOW MDM: CPT

## 2023-08-29 PROCEDURE — 87077 CULTURE AEROBIC IDENTIFY: CPT

## 2023-08-29 RX ORDER — DOXYCYCLINE HYCLATE 100 MG
100 TABLET ORAL 2 TIMES DAILY
Qty: 20 TABLET | Refills: 0 | Status: SHIPPED | OUTPATIENT
Start: 2023-08-29 | End: 2023-09-08

## 2023-08-29 RX ORDER — CEPHALEXIN 500 MG/1
500 CAPSULE ORAL ONCE
Status: COMPLETED | OUTPATIENT
Start: 2023-08-29 | End: 2023-08-29

## 2023-08-29 RX ORDER — CEPHALEXIN 500 MG/1
500 CAPSULE ORAL 3 TIMES DAILY
Qty: 30 CAPSULE | Refills: 0 | Status: SHIPPED | OUTPATIENT
Start: 2023-08-29 | End: 2023-09-08

## 2023-08-29 RX ORDER — DOXYCYCLINE HYCLATE 100 MG/1
100 CAPSULE ORAL ONCE
Status: COMPLETED | OUTPATIENT
Start: 2023-08-29 | End: 2023-08-29

## 2023-08-29 RX ADMIN — DOXYCYCLINE HYCLATE 100 MG: 100 CAPSULE ORAL at 18:45

## 2023-08-29 RX ADMIN — CEPHALEXIN 500 MG: 500 CAPSULE ORAL at 18:45

## 2023-08-29 ASSESSMENT — PAIN DESCRIPTION - DESCRIPTORS: DESCRIPTORS: ACHING;SORE;DISCOMFORT

## 2023-08-29 ASSESSMENT — PAIN SCALES - GENERAL: PAINLEVEL_OUTOF10: 7

## 2023-08-29 ASSESSMENT — PAIN DESCRIPTION - ORIENTATION: ORIENTATION: RIGHT

## 2023-08-29 ASSESSMENT — PAIN - FUNCTIONAL ASSESSMENT: PAIN_FUNCTIONAL_ASSESSMENT: 0-10

## 2023-08-29 ASSESSMENT — PAIN DESCRIPTION - LOCATION: LOCATION: BREAST

## 2023-08-29 NOTE — DISCHARGE INSTRUCTIONS
Warm compresses every 2-4 hours while awake, try to leave the area open is much as possible, try not to wear a bra when possible. Keflex 3 times daily for the next 10 days, doxycycline twice daily for the next 10 days. If you develop any worsening pain, fevers, swelling, drainage please return to the ER. Otherwise follow-up with your PCP.

## 2023-08-30 NOTE — ED PROVIDER NOTES
for this procedure. .  Prep: The skin was cleansed with povidone iodine, wiped with isopropyl alcohol and draped in a sterile fashion. Local Anesthesia:  not required/indicated. Incision: The Abscess located on right breast was puncture aspirated with an 18-gauge needle, 0.5 cc of purulent drainage was obtained, wound was not irrigated, there was no packing applied. Patient tolerated the procedure well. MDM:    This is a 53-year-old female patient who presents with a 3 history of an abscess under her right breast.  On exam patient does have a 2 cm area of fluctuance with no surrounding erythema or calor, I am able to palpate the margins of the abscess. Patient has no nipple symptoms, no fever, no systemic symptoms. I did consider labs however as patient has had the abscess for the last 3 weeks and has had no fever and has no other systemic symptoms of infection these were deferred. I did consider incision and drainage of the area but however given the location and patient's body habitus this would likely cause more complication. The abscess was puncture aspirated, wound culture was sent. Patient was advised on local wound care, she was started on doxycycline twice daily for the next 10 days, Keflex 3 times daily for the next 10 days. Patient was advised to try to leave her bra off is much as possible and leave the area open to air. Advised on warm compresses. Patient was given strict return to ER precautions and advised follow-up with PCP in 1 week for reevaluation. Patient was explicitly instructed on specific signs and symptoms on which to return to the emergency room for. Patient was instructed to return to the ER for any new or worsening symptoms. Additional discharge instructions were given verbally. All questions were answered. Patient is comfortable and agreeable with discharge plan. Patient in no acute distress and non-toxic in appearance.     Plan of Care/Counseling:  NIMCO Frank

## 2023-09-03 LAB
MICROORGANISM SPEC CULT: ABNORMAL
MICROORGANISM SPEC CULT: ABNORMAL
MICROORGANISM/AGENT SPEC: ABNORMAL
SPECIMEN DESCRIPTION: ABNORMAL

## 2023-11-30 ENCOUNTER — TELEPHONE (OUTPATIENT)
Dept: BARIATRICS/WEIGHT MGMT | Age: 48
End: 2023-11-30

## 2023-12-04 ENCOUNTER — TELEPHONE (OUTPATIENT)
Dept: BARIATRICS/WEIGHT MGMT | Age: 48
End: 2023-12-04

## 2024-03-26 ENCOUNTER — APPOINTMENT (OUTPATIENT)
Dept: GENERAL RADIOLOGY | Age: 49
End: 2024-03-26
Payer: OTHER MISCELLANEOUS

## 2024-03-26 ENCOUNTER — HOSPITAL ENCOUNTER (EMERGENCY)
Age: 49
Discharge: HOME | End: 2024-03-26
Attending: EMERGENCY MEDICINE
Payer: OTHER MISCELLANEOUS

## 2024-03-26 VITALS
OXYGEN SATURATION: 95 % | DIASTOLIC BLOOD PRESSURE: 105 MMHG | RESPIRATION RATE: 18 BRPM | HEART RATE: 70 BPM | TEMPERATURE: 98.1 F | SYSTOLIC BLOOD PRESSURE: 153 MMHG

## 2024-03-26 DIAGNOSIS — M54.50 ACUTE MIDLINE LOW BACK PAIN WITHOUT SCIATICA: Primary | ICD-10-CM

## 2024-03-26 PROCEDURE — 6360000002 HC RX W HCPCS: Performed by: EMERGENCY MEDICINE

## 2024-03-26 PROCEDURE — 96372 THER/PROPH/DIAG INJ SC/IM: CPT

## 2024-03-26 PROCEDURE — 99284 EMERGENCY DEPT VISIT MOD MDM: CPT

## 2024-03-26 PROCEDURE — 72110 X-RAY EXAM L-2 SPINE 4/>VWS: CPT

## 2024-03-26 RX ORDER — KETOROLAC TROMETHAMINE 30 MG/ML
60 INJECTION, SOLUTION INTRAMUSCULAR; INTRAVENOUS ONCE
Status: COMPLETED | OUTPATIENT
Start: 2024-03-26 | End: 2024-03-26

## 2024-03-26 RX ORDER — LIDOCAINE 50 MG/G
1 PATCH TOPICAL EVERY 24 HOURS
Qty: 30 PATCH | Refills: 0 | Status: SHIPPED | OUTPATIENT
Start: 2024-03-26 | End: 2024-04-25

## 2024-03-26 RX ORDER — DEXAMETHASONE SODIUM PHOSPHATE 4 MG/ML
4 INJECTION, SOLUTION INTRA-ARTICULAR; INTRALESIONAL; INTRAMUSCULAR; INTRAVENOUS; SOFT TISSUE ONCE
Status: COMPLETED | OUTPATIENT
Start: 2024-03-26 | End: 2024-03-26

## 2024-03-26 RX ORDER — NAPROXEN 500 MG/1
500 TABLET ORAL 2 TIMES DAILY PRN
Qty: 20 TABLET | Refills: 0 | Status: SHIPPED | OUTPATIENT
Start: 2024-03-26 | End: 2024-04-05

## 2024-03-26 RX ORDER — METHOCARBAMOL 500 MG/1
500 TABLET, FILM COATED ORAL 4 TIMES DAILY PRN
Qty: 20 TABLET | Refills: 0 | Status: SHIPPED | OUTPATIENT
Start: 2024-03-26 | End: 2024-03-31

## 2024-03-26 RX ORDER — ORPHENADRINE CITRATE 30 MG/ML
60 INJECTION INTRAMUSCULAR; INTRAVENOUS ONCE
Status: COMPLETED | OUTPATIENT
Start: 2024-03-26 | End: 2024-03-26

## 2024-03-26 RX ADMIN — DEXAMETHASONE SODIUM PHOSPHATE 4 MG: 4 INJECTION INTRA-ARTICULAR; INTRALESIONAL; INTRAMUSCULAR; INTRAVENOUS; SOFT TISSUE at 20:55

## 2024-03-26 RX ADMIN — KETOROLAC TROMETHAMINE 60 MG: 60 INJECTION, SOLUTION INTRAMUSCULAR at 20:55

## 2024-03-26 RX ADMIN — ORPHENADRINE CITRATE 60 MG: 60 INJECTION INTRAMUSCULAR; INTRAVENOUS at 20:55

## 2024-03-26 ASSESSMENT — LIFESTYLE VARIABLES
HOW MANY STANDARD DRINKS CONTAINING ALCOHOL DO YOU HAVE ON A TYPICAL DAY: 1 OR 2
HOW OFTEN DO YOU HAVE A DRINK CONTAINING ALCOHOL: MONTHLY OR LESS

## 2024-03-26 ASSESSMENT — PAIN SCALES - GENERAL: PAINLEVEL_OUTOF10: 8

## 2024-03-26 ASSESSMENT — PAIN DESCRIPTION - LOCATION: LOCATION: BACK

## 2024-03-26 ASSESSMENT — PAIN DESCRIPTION - ORIENTATION: ORIENTATION: MID

## 2024-03-26 ASSESSMENT — PAIN DESCRIPTION - DESCRIPTORS: DESCRIPTORS: SHARP;ACHING
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
## 2024-04-01 ENCOUNTER — HOSPITAL ENCOUNTER (OUTPATIENT)
Age: 49
Discharge: HOME | End: 2024-04-01
Payer: MEDICAID

## 2024-04-01 DIAGNOSIS — K91.2 MALNUTRITION FOLLOWING GASTROINTESTINAL SURGERY: ICD-10-CM

## 2024-04-01 LAB
25(OH)D3 SERPL-MCNC: 29.6 NG/ML (ref 30–100)
ALBUMIN SERPL-MCNC: 4.1 G/DL (ref 3.5–5.2)
ALP SERPL-CCNC: 63 U/L (ref 35–104)
ALT SERPL-CCNC: 10 U/L (ref 0–32)
ANION GAP SERPL CALCULATED.3IONS-SCNC: 12 MMOL/L (ref 7–16)
AST SERPL-CCNC: 17 U/L (ref 0–31)
BILIRUB SERPL-MCNC: 0.4 MG/DL (ref 0–1.2)
BUN SERPL-MCNC: 13 MG/DL (ref 6–20)
CALCIUM SERPL-MCNC: 8.7 MG/DL (ref 8.6–10.2)
CHLORIDE SERPL-SCNC: 103 MMOL/L (ref 98–107)
CO2 SERPL-SCNC: 24 MMOL/L (ref 22–29)
CREAT SERPL-MCNC: 0.8 MG/DL (ref 0.5–1)
ERYTHROCYTE [DISTWIDTH] IN BLOOD BY AUTOMATED COUNT: 14.3 % (ref 11.5–15)
FERRITIN SERPL-MCNC: 292 NG/ML
FOLATE SERPL-MCNC: 6.1 NG/ML (ref 4.8–24.2)
GFR SERPL CREATININE-BSD FRML MDRD: 88 ML/MIN/1.73M2
GLUCOSE SERPL-MCNC: 85 MG/DL (ref 74–99)
HCT VFR BLD AUTO: 37.7 % (ref 34–48)
HGB BLD-MCNC: 12.1 G/DL (ref 11.5–15.5)
MCH RBC QN AUTO: 29.3 PG (ref 26–35)
MCHC RBC AUTO-ENTMCNC: 32.1 G/DL (ref 32–34.5)
MCV RBC AUTO: 91.3 FL (ref 80–99.9)
PLATELET # BLD AUTO: 232 K/UL (ref 130–450)
PMV BLD AUTO: 10.6 FL (ref 7–12)
POTASSIUM SERPL-SCNC: 3.9 MMOL/L (ref 3.5–5)
PROT SERPL-MCNC: 7.6 G/DL (ref 6.4–8.3)
RBC # BLD AUTO: 4.13 M/UL (ref 3.5–5.5)
SODIUM SERPL-SCNC: 139 MMOL/L (ref 132–146)
VIT B12 SERPL-MCNC: 529 PG/ML (ref 211–946)
WBC OTHER # BLD: 8.7 K/UL (ref 4.5–11.5)

## 2024-04-01 PROCEDURE — 82607 VITAMIN B-12: CPT

## 2024-04-01 PROCEDURE — 84590 ASSAY OF VITAMIN A: CPT

## 2024-04-01 PROCEDURE — 82746 ASSAY OF FOLIC ACID SERUM: CPT

## 2024-04-01 PROCEDURE — 84630 ASSAY OF ZINC: CPT

## 2024-04-01 PROCEDURE — 80053 COMPREHEN METABOLIC PANEL: CPT

## 2024-04-01 PROCEDURE — 36415 COLL VENOUS BLD VENIPUNCTURE: CPT

## 2024-04-01 PROCEDURE — 84425 ASSAY OF VITAMIN B-1: CPT

## 2024-04-01 PROCEDURE — 85027 COMPLETE CBC AUTOMATED: CPT

## 2024-04-01 PROCEDURE — 82728 ASSAY OF FERRITIN: CPT

## 2024-04-01 PROCEDURE — 82306 VITAMIN D 25 HYDROXY: CPT

## 2024-04-02 ENCOUNTER — OFFICE VISIT (OUTPATIENT)
Dept: BARIATRICS/WEIGHT MGMT | Age: 49
End: 2024-04-02
Payer: MEDICAID

## 2024-04-02 VITALS
HEART RATE: 99 BPM | DIASTOLIC BLOOD PRESSURE: 84 MMHG | HEIGHT: 65 IN | BODY MASS INDEX: 39.99 KG/M2 | WEIGHT: 240 LBS | SYSTOLIC BLOOD PRESSURE: 132 MMHG

## 2024-04-02 DIAGNOSIS — L98.7 EXCESS SKIN: ICD-10-CM

## 2024-04-02 DIAGNOSIS — K21.9 GASTROESOPHAGEAL REFLUX DISEASE WITHOUT ESOPHAGITIS: ICD-10-CM

## 2024-04-02 DIAGNOSIS — K91.2 MALNUTRITION FOLLOWING GASTROINTESTINAL SURGERY: Primary | ICD-10-CM

## 2024-04-02 DIAGNOSIS — R11.0 NAUSEA: ICD-10-CM

## 2024-04-02 DIAGNOSIS — L30.4 INTERTRIGO: ICD-10-CM

## 2024-04-02 PROCEDURE — 1036F TOBACCO NON-USER: CPT | Performed by: NURSE PRACTITIONER

## 2024-04-02 PROCEDURE — 99213 OFFICE O/P EST LOW 20 MIN: CPT | Performed by: NURSE PRACTITIONER

## 2024-04-02 PROCEDURE — G8427 DOCREV CUR MEDS BY ELIG CLIN: HCPCS | Performed by: NURSE PRACTITIONER

## 2024-04-02 PROCEDURE — G8417 CALC BMI ABV UP PARAM F/U: HCPCS | Performed by: NURSE PRACTITIONER

## 2024-04-02 PROCEDURE — 99211 OFF/OP EST MAY X REQ PHY/QHP: CPT

## 2024-04-02 RX ORDER — ONDANSETRON 4 MG/1
4 TABLET, ORALLY DISINTEGRATING ORAL 3 TIMES DAILY PRN
Qty: 21 TABLET | Refills: 0 | Status: SHIPPED | OUTPATIENT
Start: 2024-04-02

## 2024-04-02 RX ORDER — OMEPRAZOLE 20 MG/1
20 CAPSULE, DELAYED RELEASE ORAL
Qty: 30 CAPSULE | Refills: 2 | Status: SHIPPED | OUTPATIENT
Start: 2024-04-02

## 2024-04-02 NOTE — PROGRESS NOTES
Ashley Polanco  4/2/2024  [facility]    Chantel-en- Y Gastric Bypass  1 year Post-Operative Follow-up     Chief Complaint   Patient presents with    Bariatrics Post Op Follow Up     LRYGB 1 yr po. Water intake 64oz.  Protein through food. Vit daily. Normal bm       Ashley Polanco is a 48 y.o. female who is 1 year post Laparoscopic Chantel-en-Y Gastric Bypass surgery.  Reports that she is doing good. She is having issues with the skin on her stomach, especially when she works out. Occasional rashes. Has been using A and D ointment. She is not having swallowing difficulty. Patient reports occasional nausea and vomiting. Patient reports occasional gastric reflux symptoms. Bowel movements are  normal per patient. Patient is not taking fiber supplements.     Patient is compliant most of the time with the iron, calcium, vitamin d, mvi - bariatric. She is meeting fluid recommendations of at least 64 ounces per day and is meeting protein recommendations. She  is exercising:  goes to the gym 5 days per week, home exercise program .     Last Surgical Weight Loss:      4/2/2024     8:31 AM   Surgical Weight Loss Tracker   Consult Date 6/10/2022   Initial Height 5' 5\"   Initial Weight 299 lb   Initial BMI 49.75   Ideal Body Weight 155 lb   Surgery Date 12/6/2022   Pre-Surgical Height 5' 5\"   Pre-Surgical Weight 303 lb   Pre Surgery BMI 50.42   Weight to Lose 148 lb   Date 4/2/2024   Height 5' 5\"   Weight 240 lb   BMI 39.93   Weight Change -63 lb   Total Weight Change -63 lb   % EBWL 43%         Weight=108.9 kg (240 lb)  Today's weight represents a total weight loss of 63 pounds since surgery with 0 pounds regained since the lowest weight.    Prior to Admission medications    Medication Sig Start Date End Date Taking? Authorizing Provider   ondansetron (ZOFRAN-ODT) 4 MG disintegrating tablet Take 1 tablet by mouth 3 times daily as needed for Nausea or Vomiting 4/2/24  Yes Audrey Correa, APRN - CNP

## 2024-04-02 NOTE — PATIENT INSTRUCTIONS
Please continue to take your vitamin and mineral supplements as instructed.      If you received a blood work prescription today for laboratory monitoring due prior to your next routine follow-up visit, please have this blood work obtained 10 to 14 days prior to your next visit.  It is important to fast for 12 hours prior to routine weight loss surgery blood work, EXCEPT for drinking water, to ensure accuracy of results.    Please report nausea, vomiting, abdominal pain, or any other problems you experience to your surgeon.  For problems related to weight loss surgery, it is best to go to [facility] Emergency Department and have your surgeon paged.    Last Surgical Weight Loss:      4/2/2024     8:31 AM   Surgical Weight Loss Tracker   Consult Date 6/10/2022   Initial Height 5' 5\"   Initial Weight 299 lb   Initial BMI 49.75   Ideal Body Weight 155 lb   Surgery Date 12/6/2022   Pre-Surgical Height 5' 5\"   Pre-Surgical Weight 303 lb   Pre Surgery BMI 50.42   Weight to Lose 148 lb   Date 4/2/2024   Height 5' 5\"   Weight 240 lb   BMI 39.93   Weight Change -63 lb   Total Weight Change -63 lb   % EBWL 43%

## 2024-04-04 LAB
RETINYL PALMITATE: 0.02 MG/L (ref 0–0.1)
VITAMIN A LEVEL: 0.5 MG/L (ref 0.3–1.2)
VITAMIN A, INTERP: NORMAL
ZINC SERPL-MCNC: 51.7 UG/DL (ref 60–120)

## 2024-04-06 LAB — VIT B1 PYROPHOSHATE BLD-SCNC: 153 NMOL/L (ref 70–180)

## 2024-04-14 ENCOUNTER — HOSPITAL ENCOUNTER (EMERGENCY)
Age: 49
Discharge: HOME | End: 2024-04-14
Attending: EMERGENCY MEDICINE
Payer: COMMERCIAL

## 2024-04-14 ENCOUNTER — APPOINTMENT (OUTPATIENT)
Dept: CT IMAGING | Age: 49
End: 2024-04-14
Payer: COMMERCIAL

## 2024-04-14 VITALS
SYSTOLIC BLOOD PRESSURE: 144 MMHG | HEART RATE: 72 BPM | OXYGEN SATURATION: 96 % | DIASTOLIC BLOOD PRESSURE: 79 MMHG | RESPIRATION RATE: 18 BRPM | TEMPERATURE: 97.7 F

## 2024-04-14 DIAGNOSIS — M51.26 LUMBAR HERNIATED DISC: Primary | ICD-10-CM

## 2024-04-14 PROCEDURE — 96372 THER/PROPH/DIAG INJ SC/IM: CPT

## 2024-04-14 PROCEDURE — 99284 EMERGENCY DEPT VISIT MOD MDM: CPT

## 2024-04-14 PROCEDURE — 72131 CT LUMBAR SPINE W/O DYE: CPT

## 2024-04-14 PROCEDURE — 6360000002 HC RX W HCPCS: Performed by: EMERGENCY MEDICINE

## 2024-04-14 RX ORDER — CYCLOBENZAPRINE HCL 10 MG
10 TABLET ORAL NIGHTLY PRN
Qty: 10 TABLET | Refills: 0 | Status: SHIPPED | OUTPATIENT
Start: 2024-04-14

## 2024-04-14 RX ORDER — OXYCODONE HYDROCHLORIDE AND ACETAMINOPHEN 5; 325 MG/1; MG/1
1 TABLET ORAL EVERY 6 HOURS PRN
Qty: 12 TABLET | Refills: 0 | Status: SHIPPED | OUTPATIENT
Start: 2024-04-14 | End: 2024-04-17

## 2024-04-14 RX ORDER — METHYLPREDNISOLONE 4 MG/1
TABLET ORAL
Qty: 1 KIT | Refills: 0 | Status: SHIPPED | OUTPATIENT
Start: 2024-04-14 | End: 2024-04-20

## 2024-04-14 RX ORDER — KETOROLAC TROMETHAMINE 30 MG/ML
30 INJECTION, SOLUTION INTRAMUSCULAR; INTRAVENOUS ONCE
Status: COMPLETED | OUTPATIENT
Start: 2024-04-14 | End: 2024-04-14

## 2024-04-14 RX ADMIN — KETOROLAC TROMETHAMINE 30 MG: 30 INJECTION, SOLUTION INTRAMUSCULAR at 10:09

## 2024-04-14 ASSESSMENT — PAIN DESCRIPTION - LOCATION: LOCATION: BACK

## 2024-04-14 ASSESSMENT — PAIN SCALES - GENERAL: PAINLEVEL_OUTOF10: 10

## 2024-04-14 ASSESSMENT — PAIN DESCRIPTION - DESCRIPTORS: DESCRIPTORS: ACHING;DISCOMFORT;DULL
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
## 2024-08-06 ENCOUNTER — OFFICE VISIT (OUTPATIENT)
Dept: SURGERY | Age: 49
End: 2024-08-06
Payer: MEDICAID

## 2024-08-06 VITALS
DIASTOLIC BLOOD PRESSURE: 76 MMHG | OXYGEN SATURATION: 98 % | HEIGHT: 65 IN | SYSTOLIC BLOOD PRESSURE: 114 MMHG | WEIGHT: 242.5 LBS | TEMPERATURE: 97.2 F | BODY MASS INDEX: 40.4 KG/M2 | RESPIRATION RATE: 20 BRPM | HEART RATE: 74 BPM

## 2024-08-06 DIAGNOSIS — E65 ABDOMINAL PANNICULUS: ICD-10-CM

## 2024-08-06 DIAGNOSIS — Z98.84 S/P GASTRIC BYPASS: Primary | ICD-10-CM

## 2024-08-06 DIAGNOSIS — E66.01 CLASS 3 SEVERE OBESITY DUE TO EXCESS CALORIES WITH BODY MASS INDEX (BMI) OF 40.0 TO 44.9 IN ADULT, UNSPECIFIED WHETHER SERIOUS COMORBIDITY PRESENT (HCC): ICD-10-CM

## 2024-08-06 PROCEDURE — 3017F COLORECTAL CA SCREEN DOC REV: CPT | Performed by: PHYSICIAN ASSISTANT

## 2024-08-06 PROCEDURE — 4004F PT TOBACCO SCREEN RCVD TLK: CPT | Performed by: PHYSICIAN ASSISTANT

## 2024-08-06 PROCEDURE — 99202 OFFICE O/P NEW SF 15 MIN: CPT | Performed by: PHYSICIAN ASSISTANT

## 2024-08-06 PROCEDURE — G8417 CALC BMI ABV UP PARAM F/U: HCPCS | Performed by: PHYSICIAN ASSISTANT

## 2024-08-06 PROCEDURE — G8427 DOCREV CUR MEDS BY ELIG CLIN: HCPCS | Performed by: PHYSICIAN ASSISTANT

## 2024-08-06 RX ORDER — NYSTATIN 100000 [USP'U]/G
POWDER TOPICAL
Qty: 60 G | Refills: 5 | Status: SHIPPED | OUTPATIENT
Start: 2024-08-06

## 2024-08-06 NOTE — PROGRESS NOTES
Department of Plastic Surgery - Adult  Attending Consult Note          CHIEF COMPLAINT:  Abdominal skin and adipose laxity    History Obtained From:  patient    BMI: 40.35    HISTORY OF PRESENT ILLNESS:                The patient is a 49 y.o. female who presents with symptomatic abdominal panniculus.  The patient states that they have had their abdominal panniculusand laxity for 3 years.  The panus is a result of bariatric surgery.  The patient states that her bariatric surgery surgery was 12/22. They state that they are at a stable weight at this time and have had a history of 100lb weight loss over the past 3 years.  The panniculus is associated with chronic, rash, wounding, pruritis, and masceration of the dermis.  The patient does  complain of intertrtigo with dermatitis occuring on the opposed surface of the skin.  The patient does have a history of infection. The patient states that the panus hangs below the level of the pubis, and has  had a history of associated genital infection.    The patient states that their associated syptoms have been recurrent for over 3 months with conservative therapy for these wounds.  The patient states that secondary to their symptomatic abdominal panniculus they have difficulty with ability to prepare food, dress themselves bathe and groom themselves andthis has been going on for greater than 3 months.  The patient believes that the excess skin does affect that there ability to prepare food, dress themselves bathe and groom themselves.  The patient currently works as a professional .  They state that their pull and strain of the abdominal panniculus with the secondary hyperhidrosis is limiting their ability to work to their fullest potential.  Also, they voice difficulties with adequately performing sexual function.     Past Medical History:    Past Medical History:   Diagnosis Date    Chronic pain of left knee     Class 3 severe obesity due to excess calories

## 2024-09-24 ENCOUNTER — APPOINTMENT (OUTPATIENT)
Dept: GENERAL RADIOLOGY | Age: 49
End: 2024-09-24
Payer: MEDICAID

## 2024-09-24 ENCOUNTER — HOSPITAL ENCOUNTER (EMERGENCY)
Age: 49
Discharge: HOME | End: 2024-09-24
Payer: MEDICAID

## 2024-09-24 VITALS
RESPIRATION RATE: 16 BRPM | BODY MASS INDEX: 39.94 KG/M2 | SYSTOLIC BLOOD PRESSURE: 140 MMHG | WEIGHT: 240 LBS | TEMPERATURE: 97.9 F | HEART RATE: 68 BPM | OXYGEN SATURATION: 98 % | DIASTOLIC BLOOD PRESSURE: 82 MMHG

## 2024-09-24 DIAGNOSIS — R31.9 HEMATURIA, UNSPECIFIED TYPE: ICD-10-CM

## 2024-09-24 DIAGNOSIS — G89.29 ACUTE EXACERBATION OF CHRONIC LOW BACK PAIN: Primary | ICD-10-CM

## 2024-09-24 DIAGNOSIS — M54.50 ACUTE EXACERBATION OF CHRONIC LOW BACK PAIN: Primary | ICD-10-CM

## 2024-09-24 LAB
BILIRUB UR QL STRIP: NEGATIVE
CLARITY UR: CLEAR
COLOR UR: YELLOW
GLUCOSE UR STRIP-MCNC: NEGATIVE MG/DL
HGB UR QL STRIP.AUTO: ABNORMAL
KETONES UR STRIP-MCNC: NEGATIVE MG/DL
LEUKOCYTE ESTERASE UR QL STRIP: NEGATIVE
NITRITE UR QL STRIP: NEGATIVE
PH UR STRIP: 6 [PH] (ref 5–9)
PROT UR STRIP-MCNC: NEGATIVE MG/DL
RBC #/AREA URNS HPF: ABNORMAL /HPF
SP GR UR STRIP: 1.02 (ref 1–1.03)
UROBILINOGEN UR STRIP-ACNC: 1 EU/DL (ref 0–1)
WBC #/AREA URNS HPF: ABNORMAL /HPF

## 2024-09-24 PROCEDURE — 87086 URINE CULTURE/COLONY COUNT: CPT

## 2024-09-24 PROCEDURE — 99284 EMERGENCY DEPT VISIT MOD MDM: CPT

## 2024-09-24 PROCEDURE — 81001 URINALYSIS AUTO W/SCOPE: CPT

## 2024-09-24 PROCEDURE — 72072 X-RAY EXAM THORAC SPINE 3VWS: CPT

## 2024-09-24 PROCEDURE — 6370000000 HC RX 637 (ALT 250 FOR IP): Performed by: NURSE PRACTITIONER

## 2024-09-24 RX ORDER — CYCLOBENZAPRINE HCL 10 MG
10 TABLET ORAL 3 TIMES DAILY PRN
Qty: 10 TABLET | Refills: 0 | Status: SHIPPED | OUTPATIENT
Start: 2024-09-24 | End: 2024-10-04

## 2024-09-24 RX ORDER — PREDNISONE 50 MG/1
50 TABLET ORAL DAILY
Qty: 5 TABLET | Refills: 0 | Status: SHIPPED | OUTPATIENT
Start: 2024-09-24 | End: 2024-09-29

## 2024-09-24 RX ORDER — CYCLOBENZAPRINE HCL 10 MG
10 TABLET ORAL ONCE
Status: COMPLETED | OUTPATIENT
Start: 2024-09-24 | End: 2024-09-24

## 2024-09-24 RX ORDER — OXYCODONE AND ACETAMINOPHEN 5; 325 MG/1; MG/1
2 TABLET ORAL ONCE
Status: COMPLETED | OUTPATIENT
Start: 2024-09-24 | End: 2024-09-24

## 2024-09-24 RX ORDER — OXYCODONE AND ACETAMINOPHEN 5; 325 MG/1; MG/1
1 TABLET ORAL EVERY 6 HOURS PRN
Qty: 12 TABLET | Refills: 0 | Status: SHIPPED | OUTPATIENT
Start: 2024-09-24 | End: 2024-09-27

## 2024-09-24 RX ADMIN — CYCLOBENZAPRINE 10 MG: 10 TABLET, FILM COATED ORAL at 12:34

## 2024-09-24 RX ADMIN — OXYCODONE HYDROCHLORIDE AND ACETAMINOPHEN 2 TABLET: 5; 325 TABLET ORAL at 12:34

## 2024-09-24 ASSESSMENT — PAIN DESCRIPTION - LOCATION: LOCATION: BACK

## 2024-09-24 ASSESSMENT — PAIN SCALES - GENERAL
PAINLEVEL_OUTOF10: 10
PAINLEVEL_OUTOF10: 10
PAINLEVEL_OUTOF10: 8

## 2024-09-24 ASSESSMENT — PAIN DESCRIPTION - ORIENTATION: ORIENTATION: LOWER;MID;UPPER

## 2024-09-24 ASSESSMENT — PAIN - FUNCTIONAL ASSESSMENT: PAIN_FUNCTIONAL_ASSESSMENT: 0-10

## 2024-09-24 ASSESSMENT — PAIN DESCRIPTION - DESCRIPTORS: DESCRIPTORS: ACHING;DISCOMFORT;SHARP;SPASM

## 2024-09-24 ASSESSMENT — PAIN DESCRIPTION - ONSET: ONSET: ON-GOING

## 2024-09-24 ASSESSMENT — PAIN DESCRIPTION - PAIN TYPE: TYPE: ACUTE PAIN

## 2024-09-24 ASSESSMENT — LIFESTYLE VARIABLES
HOW MANY STANDARD DRINKS CONTAINING ALCOHOL DO YOU HAVE ON A TYPICAL DAY: PATIENT DOES NOT DRINK
HOW OFTEN DO YOU HAVE A DRINK CONTAINING ALCOHOL: NEVER

## 2024-09-24 ASSESSMENT — PAIN DESCRIPTION - FREQUENCY: FREQUENCY: CONTINUOUS

## 2024-09-26 LAB
MICROORGANISM SPEC CULT: ABNORMAL
MICROORGANISM SPEC CULT: ABNORMAL
SERVICE CMNT-IMP: ABNORMAL
SPECIMEN DESCRIPTION: ABNORMAL

## 2024-11-01 NOTE — ANESTHESIA POSTPROCEDURE EVALUATION
Department of Anesthesiology  Postprocedure Note    Patient: Jose Padilla  MRN: [id number]  YOB: 1975  Date of evaluation: 7/7/2022      Procedure Summary     Date: 07/07/22 Room / Location: [address] [place] [address] / [address] [place] [address]    Anesthesia Start: 6388 Anesthesia Stop: 5998    Procedure: EGD BIOPSY (N/A ) Diagnosis:       Gastroesophageal reflux disease, unspecified whether esophagitis present      (Gastroesophageal reflux disease, unspecified whether esophagitis present [K21.9])    Surgeons: Mac Whitten MD Responsible Provider: Gillian Guerrero MD    Anesthesia Type: MAC ASA Status: 3          Anesthesia Type: No value filed.     Castillo Phase I: Castillo Score: 10    Castillo Phase II: Castillo Score: 10      Anesthesia Post Evaluation    Patient location during evaluation: PACU  Patient participation: complete - patient participated  Level of consciousness: awake  Airway patency: patent  Nausea & Vomiting: no nausea and no vomiting  Complications: no  Cardiovascular status: hemodynamically stable  Respiratory status: acceptable  Hydration status: euvolemic
Time-based billing (NON-critical care)

## 2024-12-02 ENCOUNTER — TELEPHONE (OUTPATIENT)
Dept: BARIATRICS/WEIGHT MGMT | Age: 49
End: 2024-12-02

## 2024-12-18 ENCOUNTER — TELEPHONE (OUTPATIENT)
Dept: BARIATRICS/WEIGHT MGMT | Age: 49
End: 2024-12-18

## 2024-12-18 NOTE — TELEPHONE ENCOUNTER
Patient is due for their annual follow up and needs scheduled. I sent a detailed letter to call our office.

## 2025-01-03 ENCOUNTER — HOSPITAL ENCOUNTER (EMERGENCY)
Age: 50
Discharge: HOME | End: 2025-01-03
Payer: MEDICAID

## 2025-01-03 ENCOUNTER — APPOINTMENT (OUTPATIENT)
Dept: GENERAL RADIOLOGY | Age: 50
End: 2025-01-03
Payer: MEDICAID

## 2025-01-03 VITALS
WEIGHT: 245.8 LBS | SYSTOLIC BLOOD PRESSURE: 141 MMHG | BODY MASS INDEX: 40.9 KG/M2 | HEART RATE: 64 BPM | OXYGEN SATURATION: 100 % | RESPIRATION RATE: 16 BRPM | DIASTOLIC BLOOD PRESSURE: 90 MMHG | TEMPERATURE: 97.9 F

## 2025-01-03 DIAGNOSIS — M54.9 UPPER BACK PAIN: Primary | ICD-10-CM

## 2025-01-03 PROCEDURE — 6370000000 HC RX 637 (ALT 250 FOR IP): Performed by: PHYSICIAN ASSISTANT

## 2025-01-03 PROCEDURE — 99283 EMERGENCY DEPT VISIT LOW MDM: CPT

## 2025-01-03 PROCEDURE — 72072 X-RAY EXAM THORAC SPINE 3VWS: CPT

## 2025-01-03 RX ORDER — IBUPROFEN 800 MG/1
800 TABLET, FILM COATED ORAL EVERY 8 HOURS PRN
Qty: 21 TABLET | Refills: 0 | Status: SHIPPED | OUTPATIENT
Start: 2025-01-03 | End: 2025-01-10

## 2025-01-03 RX ORDER — OXYCODONE AND ACETAMINOPHEN 5; 325 MG/1; MG/1
1 TABLET ORAL ONCE
Status: COMPLETED | OUTPATIENT
Start: 2025-01-03 | End: 2025-01-03

## 2025-01-03 RX ORDER — IBUPROFEN 800 MG/1
800 TABLET, FILM COATED ORAL ONCE
Status: COMPLETED | OUTPATIENT
Start: 2025-01-03 | End: 2025-01-03

## 2025-01-03 RX ORDER — CYCLOBENZAPRINE HCL 10 MG
10 TABLET ORAL 3 TIMES DAILY PRN
Qty: 15 TABLET | Refills: 0 | Status: SHIPPED | OUTPATIENT
Start: 2025-01-03 | End: 2025-01-08

## 2025-01-03 RX ORDER — CYCLOBENZAPRINE HCL 10 MG
10 TABLET ORAL ONCE
Status: COMPLETED | OUTPATIENT
Start: 2025-01-03 | End: 2025-01-03

## 2025-01-03 RX ADMIN — IBUPROFEN 800 MG: 800 TABLET, FILM COATED ORAL at 21:49

## 2025-01-03 RX ADMIN — CYCLOBENZAPRINE 10 MG: 10 TABLET, FILM COATED ORAL at 21:49

## 2025-01-03 RX ADMIN — OXYCODONE HYDROCHLORIDE AND ACETAMINOPHEN 1 TABLET: 5; 325 TABLET ORAL at 23:43

## 2025-01-03 ASSESSMENT — ENCOUNTER SYMPTOMS
SHORTNESS OF BREATH: 0
COLOR CHANGE: 0
COUGH: 0
CHEST TIGHTNESS: 0
BACK PAIN: 1

## 2025-01-03 ASSESSMENT — PAIN SCALES - GENERAL
PAINLEVEL_OUTOF10: 10

## 2025-01-03 ASSESSMENT — PAIN DESCRIPTION - ORIENTATION
ORIENTATION: UPPER;MID
ORIENTATION: MID;UPPER
ORIENTATION: MID

## 2025-01-03 ASSESSMENT — LIFESTYLE VARIABLES
HOW MANY STANDARD DRINKS CONTAINING ALCOHOL DO YOU HAVE ON A TYPICAL DAY: 1 OR 2
HOW OFTEN DO YOU HAVE A DRINK CONTAINING ALCOHOL: 2-4 TIMES A MONTH

## 2025-01-03 ASSESSMENT — PAIN DESCRIPTION - LOCATION
LOCATION: BACK

## 2025-01-03 ASSESSMENT — PAIN DESCRIPTION - DESCRIPTORS
DESCRIPTORS: DISCOMFORT;ACHING;BURNING
DESCRIPTORS: DISCOMFORT;BURNING;ACHING
DESCRIPTORS: DISCOMFORT;BURNING;ACHING

## 2025-01-03 ASSESSMENT — PAIN - FUNCTIONAL ASSESSMENT: PAIN_FUNCTIONAL_ASSESSMENT: 0-10

## 2025-01-04 NOTE — ED PROVIDER NOTES
Lateral vertebral body height and alignment maintained.  Mild midthoracic   spine endplate spondylosis.             Procedures:  Procedures     Medical Decision Making:   MDM  49-year-old female presenting the emergency department with midline upper back pain for the last week.  She cannot think of any specific trauma or injury but does do strenuous activity daily and constantly picking up children.  She does have some midline thoracic tenderness as well as thoracic paraspinal tenderness.  The pain is made worse with movement.  She has no signs of any neurological deficits or radiculopathy.  She is afebrile and hemodynamically stable and resting comfortably in no acute distress.  She has been using lidocaine patches at home without relief.  Patient given ibuprofen and Flexeril in the ED but reported no relief, given additional percocet for the pain. X-ray showed no acute abnormality and no significant disc herniation or narrowing.  There is mild endplate spondylosis.  Given the reproducible pain over the muscles, this is likely musculoskeletal in nature and related to overuse.  Patient will be treated with NSAID and muscle relaxant.  She is encouraged to follow-up with her primary care physician if symptoms persist for further workup.  Patient agreeable to plan and discharged home in good condition    Counseling:   The emergency provider has spoken with the patient and discussed today’s results, in addition to providing specific details for the plan of care and counseling regarding the diagnosis and prognosis.  Questions are answered at this time and they are agreeable with the plan.      --------------------------------- IMPRESSION AND DISPOSITION ---------------------------------    IMPRESSION  1. Upper back pain        DISPOSITION  Disposition: Discharge to home  Patient condition is good      Electronically signed by Xena Jolly PA-C   DD: 1/3/25  **This report was transcribed using voice recognition

## (undated) DEVICE — TIP-UP FENESTRATED GRASPER: Brand: ENDOWRIST

## (undated) DEVICE — STRIP,CLOSURE,WOUND,MEDI-STRIP,1/4X3: Brand: MEDLINE

## (undated) DEVICE — TOWEL,OR,DSP,ST,BLUE,STD,6/PK,12PK/CS: Brand: MEDLINE

## (undated) DEVICE — PUMP SUC IRR TBNG L10FT W/ HNDPC ASSEMB STRYKEFLOW 2

## (undated) DEVICE — SPONGE,LAP,4"X18",XR,ST,5/PK,40PK/CS: Brand: MEDLINE INDUSTRIES, INC.

## (undated) DEVICE — CONTAINER SPEC COLL 960ML POLYPR TRIANG GRAD INTAKE/OUTPUT

## (undated) DEVICE — PACK PROCEDURE SURG GEN CUST

## (undated) DEVICE — SPONGE,PEANUT,XRAY,ST,SM,3/8",5/CARD: Brand: MEDLINE INDUSTRIES, INC.

## (undated) DEVICE — CHLORAPREP 26ML ORANGE

## (undated) DEVICE — SOLUTION IRRIG 1000ML 0.9% SOD CHL USP POUR PLAS BTL

## (undated) DEVICE — SET SURG INSTR MINI VASC

## (undated) DEVICE — FORCEPS BX L240CM JAW DIA2.8MM L CAP W/ NDL MIC MESH TOOTH

## (undated) DEVICE — GOWN ISOLATN REG YEL M WT MULTIPLY SIDETIE LEV 2

## (undated) DEVICE — ELECTRODE ELECSURG NDL 2.8 INX7.2 CM COAT INSUL EDGE

## (undated) DEVICE — TROCAR: Brand: KII FIOS FIRST ENTRY

## (undated) DEVICE — 4-PORT MANIFOLD: Brand: NEPTUNE 2

## (undated) DEVICE — REDUCER: Brand: ENDOWRIST

## (undated) DEVICE — PROBE 8225101 5PK STD PRASS FL TIP ROHS

## (undated) DEVICE — TUBING STRL INST 6INX656FT

## (undated) DEVICE — CONTAINER SPEC 480ML CLR POLYSTYR 10% NEUT BUFF FRMLN ZN

## (undated) DEVICE — CADIERE FORCEPS: Brand: ENDOWRIST

## (undated) DEVICE — PACK SURG LAP CHOLE CUSTOM

## (undated) DEVICE — ELECTRODE PT RET AD L9FT HI MOIST COND ADH HYDRGEL CORDED

## (undated) DEVICE — SYNCHROSEAL: Brand: DA VINCI ENERGY

## (undated) DEVICE — DOUBLE BASIN SET: Brand: MEDLINE INDUSTRIES, INC.

## (undated) DEVICE — COVER,LIGHT HANDLE,FLX,1/PK: Brand: MEDLINE INDUSTRIES, INC.

## (undated) DEVICE — GARMENT,MEDLINE,DVT,INT,CALF,MED, GEN2: Brand: MEDLINE

## (undated) DEVICE — MDS601L2 INT-CS,CALF,GEN2 LG,BL/RD: Brand: MEDLINE

## (undated) DEVICE — GLOVE ORANGE PI 7 1/2   MSG9075

## (undated) DEVICE — PITCHER PT 1200ML W HNDL CSR WRP

## (undated) DEVICE — MEGA SUTURECUT ND: Brand: ENDOWRIST

## (undated) DEVICE — SOLUTION IRRIG 1000ML STRL H2O USP PLAS POUR BTL

## (undated) DEVICE — GAUZE,SPONGE,4"X4",8PLY,STRL,LF,10/TRAY: Brand: MEDLINE

## (undated) DEVICE — MARKER,SKIN,WI/RULER AND LABELS: Brand: MEDLINE

## (undated) DEVICE — DISSECTOR ENDOSCP L21CM TIP CURVATURE 40DEG FN CRV JAW VES

## (undated) DEVICE — ELECTRO LUBE IS A SINGLE PATIENT USE DEVICE THAT IS INTENDED TO BE USED ON ELECTROSURGICAL ELECTRODES TO REDUCE STICKING.: Brand: KEY SURGICAL ELECTRO LUBE

## (undated) DEVICE — GOWN,SIRUS,NONRNF,SETINSLV,XL,20/CS: Brand: MEDLINE

## (undated) DEVICE — COVER HNDL LT DISP

## (undated) DEVICE — STAPLER 60: Brand: SUREFORM

## (undated) DEVICE — TROCAR: Brand: KII SLEEVE

## (undated) DEVICE — SWABSTICK MEDICATED L4IN BENZ TINC SKIN PREP APLICARE

## (undated) DEVICE — KENDALL 450 SERIES MONITORING FOAM ELECTRODE - RECTANGULAR SHAPE ( 3/PK): Brand: KENDALL

## (undated) DEVICE — BLOCK BITE 60FR CAREGUARD

## (undated) DEVICE — CLOTH SURG PREP PREOPERATIVE CHLORHEXIDINE GLUC 2% READYPREP

## (undated) DEVICE — CLEANER LENS C-CLEAR

## (undated) DEVICE — INSUFFLATION NEEDLE TO ESTABLISH PNEUMOPERITONEUM.: Brand: INSUFFLATION NEEDLE

## (undated) DEVICE — KIT BEDSIDE REVITAL OX 500ML

## (undated) DEVICE — APPLICATOR MEDICATED 26 CC SOLUTION HI LT ORNG CHLORAPREP

## (undated) DEVICE — COLUMN DRAPE

## (undated) DEVICE — LUBRICANT SURG JELLY ST BACTER TUBE 4.25OZ

## (undated) DEVICE — 6 X 9  1.75MIL 4-WALL LABGUARD: Brand: MINIGRIP COMMERCIAL LLC

## (undated) DEVICE — BLADELESS OBTURATOR: Brand: WECK VISTA

## (undated) DEVICE — INTENDED FOR TISSUE SEPARATION, AND OTHER PROCEDURES THAT REQUIRE A SHARP SURGICAL BLADE TO PUNCTURE OR CUT.: Brand: BARD-PARKER ® STAINLESS STEEL BLADES

## (undated) DEVICE — 3M™ TEGADERM™ TRANSPARENT FILM DRESSING FRAME STYLE, 1626W, 4 IN X 4-3/4 IN (10 CM X 12 CM), 50/CT 4CT/CASE: Brand: 3M™ TEGADERM™

## (undated) DEVICE — ARM DRAPE

## (undated) DEVICE — EXTRAS THYROID

## (undated) DEVICE — MASK,FACE,MAXFLUIDPROTECT,SHIELD/ERLPS: Brand: MEDLINE

## (undated) DEVICE — STAPLER 60 RELOAD BLUE: Brand: SUREFORM

## (undated) DEVICE — MASK O2 AD TB L7FT HI CONC PART N RBRTH W RESVR BG SFTY

## (undated) DEVICE — STAPLER 60 RELOAD WHITE: Brand: SUREFORM

## (undated) DEVICE — SHEET DRAPE FULL 70X100

## (undated) DEVICE — SUTURE ABSRB L6IN L37MM 0 GS-21 GRN 1/2 CIR TAPR PNT NDL VLOCL0306

## (undated) DEVICE — SURGICAL PROCEDURE PACK EENT CUST

## (undated) DEVICE — Device: Brand: INSTRUSAFE

## (undated) DEVICE — SPONGE GZ 4IN 4IN 4 PLY N WVN AVANT

## (undated) DEVICE — TUBING, SUCTION, 1/4" X 10', STRAIGHT: Brand: MEDLINE

## (undated) DEVICE — SUTURE V-LOC 180 SZ 0 L9IN ABSRB GRN GS-21 L37MM 1/2 CIR VLOCL0346

## (undated) DEVICE — CANNULA SEAL

## (undated) DEVICE — YANKAUER,BULB TIP,W/O VENT,RIGID,STERILE: Brand: MEDLINE

## (undated) DEVICE — PMI PTFE COATED LAPAROSCOPIC WIRE L-HOOK 44 CM: Brand: PMI

## (undated) DEVICE — [HIGH FLOW INSUFFLATOR,  DO NOT USE IF PACKAGE IS DAMAGED,  KEEP DRY,  KEEP AWAY FROM SUNLIGHT,  PROTECT FROM HEAT AND RADIOACTIVE SOURCES.]: Brand: PNEUMOSURE

## (undated) DEVICE — Device

## (undated) DEVICE — VALVE SUCTION AIR H2O HYDR H2O JET CONN STRL ORCA POD + DISP

## (undated) DEVICE — NEEDLE HYPO 22GA L1.5IN BLK POLYPR HUB S STL REG BVL STR

## (undated) DEVICE — SEAL

## (undated) DEVICE — SEALER/DIVIDER LAP SHFT L44CM DIA5MM STR BLNT TIP JAW HND